# Patient Record
Sex: FEMALE | Race: WHITE | Employment: FULL TIME | URBAN - NONMETROPOLITAN AREA
[De-identification: names, ages, dates, MRNs, and addresses within clinical notes are randomized per-mention and may not be internally consistent; named-entity substitution may affect disease eponyms.]

---

## 2017-03-14 ENCOUNTER — OFFICE VISIT (OUTPATIENT)
Dept: ENDOCRINOLOGY | Age: 42
End: 2017-03-14

## 2017-03-14 VITALS
SYSTOLIC BLOOD PRESSURE: 132 MMHG | WEIGHT: 293 LBS | BODY MASS INDEX: 50.02 KG/M2 | HEART RATE: 101 BPM | RESPIRATION RATE: 20 BRPM | HEIGHT: 64 IN | DIASTOLIC BLOOD PRESSURE: 83 MMHG

## 2017-03-14 DIAGNOSIS — E11.9 TYPE 2 DIABETES MELLITUS WITHOUT COMPLICATION, WITHOUT LONG-TERM CURRENT USE OF INSULIN (HCC): Primary | ICD-10-CM

## 2017-03-14 DIAGNOSIS — E78.5 DYSLIPIDEMIA: ICD-10-CM

## 2017-03-14 DIAGNOSIS — E04.9 GOITER: ICD-10-CM

## 2017-03-14 PROBLEM — E78.2 MIXED HYPERLIPIDEMIA: Status: ACTIVE | Noted: 2017-03-14

## 2017-03-14 PROCEDURE — 99214 OFFICE O/P EST MOD 30 MIN: CPT | Performed by: INTERNAL MEDICINE

## 2017-03-14 RX ORDER — ATORVASTATIN CALCIUM 40 MG/1
40 TABLET, FILM COATED ORAL DAILY
Qty: 30 TABLET | Refills: 5 | Status: SHIPPED | OUTPATIENT
Start: 2017-03-14 | End: 2017-08-10 | Stop reason: SDUPTHER

## 2017-03-14 RX ORDER — LORATADINE 10 MG/1
10 TABLET ORAL DAILY
COMMUNITY
End: 2017-08-10 | Stop reason: CLARIF

## 2017-07-25 ENCOUNTER — APPOINTMENT (OUTPATIENT)
Dept: INTERVENTIONAL RADIOLOGY/VASCULAR | Age: 42
DRG: 607 | End: 2017-07-25
Payer: COMMERCIAL

## 2017-07-25 ENCOUNTER — HOSPITAL ENCOUNTER (INPATIENT)
Age: 42
LOS: 2 days | Discharge: HOME | DRG: 607 | End: 2017-07-27
Attending: EMERGENCY MEDICINE | Admitting: INTERNAL MEDICINE
Payer: COMMERCIAL

## 2017-07-25 DIAGNOSIS — B95.8 STAPHYLOCOCCAL SKIN DISORDER: Primary | ICD-10-CM

## 2017-07-25 DIAGNOSIS — L08.9 STAPHYLOCOCCAL SKIN DISORDER: Primary | ICD-10-CM

## 2017-07-25 PROBLEM — L03.119 CELLULITIS OF LOWER EXTREMITY: Status: ACTIVE | Noted: 2017-07-25

## 2017-07-25 LAB
ANION GAP SERPL CALCULATED.3IONS-SCNC: 18 MEQ/L (ref 8–16)
ANISOCYTOSIS: ABNORMAL
BASOPHILS # BLD: 0.5 %
BASOPHILS ABSOLUTE: 0.1 THOU/MM3 (ref 0–0.1)
BUN BLDV-MCNC: 9 MG/DL (ref 7–22)
CALCIUM SERPL-MCNC: 10 MG/DL (ref 8.5–10.5)
CHLORIDE BLD-SCNC: 97 MEQ/L (ref 98–111)
CO2: 23 MEQ/L (ref 23–33)
CREAT SERPL-MCNC: 0.5 MG/DL (ref 0.4–1.2)
EOSINOPHIL # BLD: 3.3 %
EOSINOPHILS ABSOLUTE: 0.3 THOU/MM3 (ref 0–0.4)
GFR SERPL CREATININE-BSD FRML MDRD: > 90 ML/MIN/1.73M2
GLUCOSE BLD-MCNC: 156 MG/DL (ref 70–108)
HCT VFR BLD CALC: 40.5 % (ref 37–47)
HEMOGLOBIN: 13.7 GM/DL (ref 12–16)
LACTIC ACID: 2.6 MMOL/L (ref 0.5–2.2)
LYMPHOCYTES # BLD: 23 %
LYMPHOCYTES ABSOLUTE: 2.4 THOU/MM3 (ref 1–4.8)
MCH RBC QN AUTO: 28.9 PG (ref 27–31)
MCHC RBC AUTO-ENTMCNC: 33.7 GM/DL (ref 33–37)
MCV RBC AUTO: 85.8 FL (ref 81–99)
MONOCYTES # BLD: 5.7 %
MONOCYTES ABSOLUTE: 0.6 THOU/MM3 (ref 0.4–1.3)
NUCLEATED RED BLOOD CELLS: 0 /100 WBC
OSMOLALITY CALCULATION: 277.6 MOSMOL/KG (ref 275–300)
PDW BLD-RTO: 15.7 % (ref 11.5–14.5)
PLATELET # BLD: 242 THOU/MM3 (ref 130–400)
PMV BLD AUTO: 10.3 MCM (ref 7.4–10.4)
POTASSIUM SERPL-SCNC: 3.7 MEQ/L (ref 3.5–5.2)
RBC # BLD: 4.72 MILL/MM3 (ref 4.2–5.4)
RBC # BLD: NORMAL 10*6/UL
SEG NEUTROPHILS: 67.5 %
SEGMENTED NEUTROPHILS ABSOLUTE COUNT: 7 THOU/MM3 (ref 1.8–7.7)
SODIUM BLD-SCNC: 138 MEQ/L (ref 135–145)
WBC # BLD: 10.3 THOU/MM3 (ref 4.8–10.8)

## 2017-07-25 PROCEDURE — 85025 COMPLETE CBC W/AUTO DIFF WBC: CPT

## 2017-07-25 PROCEDURE — 1200000000 HC SEMI PRIVATE

## 2017-07-25 PROCEDURE — 83605 ASSAY OF LACTIC ACID: CPT

## 2017-07-25 PROCEDURE — 96375 TX/PRO/DX INJ NEW DRUG ADDON: CPT

## 2017-07-25 PROCEDURE — 2580000003 HC RX 258: Performed by: STUDENT IN AN ORGANIZED HEALTH CARE EDUCATION/TRAINING PROGRAM

## 2017-07-25 PROCEDURE — 99284 EMERGENCY DEPT VISIT MOD MDM: CPT

## 2017-07-25 PROCEDURE — 6360000002 HC RX W HCPCS: Performed by: STUDENT IN AN ORGANIZED HEALTH CARE EDUCATION/TRAINING PROGRAM

## 2017-07-25 PROCEDURE — 36415 COLL VENOUS BLD VENIPUNCTURE: CPT

## 2017-07-25 PROCEDURE — 99223 1ST HOSP IP/OBS HIGH 75: CPT | Performed by: INTERNAL MEDICINE

## 2017-07-25 PROCEDURE — 96365 THER/PROPH/DIAG IV INF INIT: CPT

## 2017-07-25 PROCEDURE — 80048 BASIC METABOLIC PNL TOTAL CA: CPT

## 2017-07-25 PROCEDURE — 87040 BLOOD CULTURE FOR BACTERIA: CPT

## 2017-07-25 RX ADMIN — HYDROMORPHONE HYDROCHLORIDE 0.5 MG: 1 INJECTION, SOLUTION INTRAMUSCULAR; INTRAVENOUS; SUBCUTANEOUS at 22:09

## 2017-07-25 RX ADMIN — CEFTRIAXONE 1 G: 1 INJECTION, POWDER, FOR SOLUTION INTRAMUSCULAR; INTRAVENOUS at 20:53

## 2017-07-25 ASSESSMENT — ENCOUNTER SYMPTOMS
EYE PAIN: 0
NAUSEA: 0
WHEEZING: 0
RHINORRHEA: 0
VOMITING: 0
EYE DISCHARGE: 0
ABDOMINAL PAIN: 0
BACK PAIN: 0
DIARRHEA: 0
SORE THROAT: 0
COUGH: 0
SHORTNESS OF BREATH: 0

## 2017-07-25 ASSESSMENT — PAIN DESCRIPTION - PAIN TYPE
TYPE: ACUTE PAIN
TYPE: ACUTE PAIN

## 2017-07-25 ASSESSMENT — PAIN DESCRIPTION - FREQUENCY: FREQUENCY: CONTINUOUS

## 2017-07-25 ASSESSMENT — PAIN DESCRIPTION - LOCATION
LOCATION: LEG
LOCATION: LEG

## 2017-07-25 ASSESSMENT — PAIN DESCRIPTION - ORIENTATION
ORIENTATION: RIGHT;LEFT
ORIENTATION: RIGHT;LEFT

## 2017-07-25 ASSESSMENT — PAIN SCALES - GENERAL
PAINLEVEL_OUTOF10: 8
PAINLEVEL_OUTOF10: 8

## 2017-07-25 ASSESSMENT — PAIN DESCRIPTION - DESCRIPTORS
DESCRIPTORS: TIGHTNESS
DESCRIPTORS: SQUEEZING

## 2017-07-26 ENCOUNTER — APPOINTMENT (OUTPATIENT)
Dept: INTERVENTIONAL RADIOLOGY/VASCULAR | Age: 42
DRG: 607 | End: 2017-07-26
Payer: COMMERCIAL

## 2017-07-26 PROBLEM — M79.10 MYALGIA: Status: ACTIVE | Noted: 2017-07-26

## 2017-07-26 PROBLEM — T78.40XA ALLERGIC DRUG REACTION: Status: ACTIVE | Noted: 2017-07-26

## 2017-07-26 LAB
C-REACTIVE PROTEIN: 0.76 MG/DL (ref 0–1)
LACTIC ACID: 2.3 MMOL/L (ref 0.5–2.2)
RPR: NONREACTIVE
SEDIMENTATION RATE, ERYTHROCYTE: 41 MM/HR (ref 0–20)

## 2017-07-26 PROCEDURE — 85651 RBC SED RATE NONAUTOMATED: CPT

## 2017-07-26 PROCEDURE — 2500000003 HC RX 250 WO HCPCS: Performed by: INTERNAL MEDICINE

## 2017-07-26 PROCEDURE — 86592 SYPHILIS TEST NON-TREP QUAL: CPT

## 2017-07-26 PROCEDURE — 6370000000 HC RX 637 (ALT 250 FOR IP): Performed by: INTERNAL MEDICINE

## 2017-07-26 PROCEDURE — 6360000002 HC RX W HCPCS: Performed by: INTERNAL MEDICINE

## 2017-07-26 PROCEDURE — 99232 SBSQ HOSP IP/OBS MODERATE 35: CPT | Performed by: FAMILY MEDICINE

## 2017-07-26 PROCEDURE — 93970 EXTREMITY STUDY: CPT

## 2017-07-26 PROCEDURE — 86140 C-REACTIVE PROTEIN: CPT

## 2017-07-26 PROCEDURE — 1200000000 HC SEMI PRIVATE

## 2017-07-26 PROCEDURE — 36415 COLL VENOUS BLD VENIPUNCTURE: CPT

## 2017-07-26 RX ORDER — OYSTER SHELL CALCIUM WITH VITAMIN D 500; 200 MG/1; [IU]/1
1 TABLET, FILM COATED ORAL DAILY
Status: DISCONTINUED | OUTPATIENT
Start: 2017-07-26 | End: 2017-07-27 | Stop reason: HOSPADM

## 2017-07-26 RX ORDER — ALBUTEROL SULFATE 90 UG/1
2 AEROSOL, METERED RESPIRATORY (INHALATION) EVERY 6 HOURS PRN
Status: DISCONTINUED | OUTPATIENT
Start: 2017-07-26 | End: 2017-07-27 | Stop reason: HOSPADM

## 2017-07-26 RX ORDER — FLUOXETINE HYDROCHLORIDE 20 MG/1
20 CAPSULE ORAL DAILY
Status: DISCONTINUED | OUTPATIENT
Start: 2017-07-26 | End: 2017-07-27 | Stop reason: HOSPADM

## 2017-07-26 RX ORDER — CLINDAMYCIN PHOSPHATE 600 MG/50ML
600 INJECTION INTRAVENOUS EVERY 8 HOURS
Status: DISCONTINUED | OUTPATIENT
Start: 2017-07-26 | End: 2017-07-27 | Stop reason: HOSPADM

## 2017-07-26 RX ORDER — CETIRIZINE HYDROCHLORIDE 10 MG/1
10 TABLET ORAL DAILY
Status: DISCONTINUED | OUTPATIENT
Start: 2017-07-26 | End: 2017-07-27 | Stop reason: HOSPADM

## 2017-07-26 RX ORDER — METHYLPREDNISOLONE SODIUM SUCCINATE 40 MG/ML
40 INJECTION, POWDER, LYOPHILIZED, FOR SOLUTION INTRAMUSCULAR; INTRAVENOUS EVERY 6 HOURS
Status: DISCONTINUED | OUTPATIENT
Start: 2017-07-26 | End: 2017-07-27 | Stop reason: HOSPADM

## 2017-07-26 RX ORDER — MONTELUKAST SODIUM 10 MG/1
10 TABLET ORAL NIGHTLY
Status: DISCONTINUED | OUTPATIENT
Start: 2017-07-26 | End: 2017-07-27 | Stop reason: HOSPADM

## 2017-07-26 RX ORDER — ATORVASTATIN CALCIUM 40 MG/1
40 TABLET, FILM COATED ORAL DAILY
Status: DISCONTINUED | OUTPATIENT
Start: 2017-07-26 | End: 2017-07-27 | Stop reason: HOSPADM

## 2017-07-26 RX ORDER — DIPHENHYDRAMINE HYDROCHLORIDE 50 MG/ML
12.5 INJECTION INTRAMUSCULAR; INTRAVENOUS EVERY 6 HOURS PRN
Status: DISCONTINUED | OUTPATIENT
Start: 2017-07-26 | End: 2017-07-27 | Stop reason: HOSPADM

## 2017-07-26 RX ORDER — MORPHINE SULFATE 2 MG/ML
2 INJECTION, SOLUTION INTRAMUSCULAR; INTRAVENOUS EVERY 4 HOURS PRN
Status: DISCONTINUED | OUTPATIENT
Start: 2017-07-26 | End: 2017-07-27 | Stop reason: HOSPADM

## 2017-07-26 RX ORDER — ROPINIROLE 1 MG/1
1 TABLET, FILM COATED ORAL NIGHTLY
Status: DISCONTINUED | OUTPATIENT
Start: 2017-07-26 | End: 2017-07-27 | Stop reason: HOSPADM

## 2017-07-26 RX ADMIN — CLINDAMYCIN PHOSPHATE 600 MG: 12 INJECTION, SOLUTION INTRAMUSCULAR; INTRAVENOUS at 10:12

## 2017-07-26 RX ADMIN — METHYLPREDNISOLONE SODIUM SUCCINATE 40 MG: 40 INJECTION, POWDER, FOR SOLUTION INTRAMUSCULAR; INTRAVENOUS at 14:44

## 2017-07-26 RX ADMIN — METHYLPREDNISOLONE SODIUM SUCCINATE 40 MG: 40 INJECTION, POWDER, FOR SOLUTION INTRAMUSCULAR; INTRAVENOUS at 09:11

## 2017-07-26 RX ADMIN — ROPINIROLE HYDROCHLORIDE 1 MG: 1 TABLET, FILM COATED ORAL at 20:57

## 2017-07-26 RX ADMIN — DIPHENHYDRAMINE HYDROCHLORIDE 12.5 MG: 50 INJECTION, SOLUTION INTRAMUSCULAR; INTRAVENOUS at 20:57

## 2017-07-26 RX ADMIN — MONTELUKAST SODIUM 10 MG: 10 TABLET, FILM COATED ORAL at 20:57

## 2017-07-26 RX ADMIN — MONTELUKAST SODIUM 10 MG: 10 TABLET, FILM COATED ORAL at 01:49

## 2017-07-26 RX ADMIN — DIPHENHYDRAMINE HYDROCHLORIDE 12.5 MG: 50 INJECTION, SOLUTION INTRAMUSCULAR; INTRAVENOUS at 14:43

## 2017-07-26 RX ADMIN — ROPINIROLE HYDROCHLORIDE 1 MG: 1 TABLET, FILM COATED ORAL at 01:49

## 2017-07-26 RX ADMIN — CLINDAMYCIN PHOSPHATE 600 MG: 12 INJECTION, SOLUTION INTRAMUSCULAR; INTRAVENOUS at 01:49

## 2017-07-26 RX ADMIN — ATORVASTATIN CALCIUM 40 MG: 40 TABLET, FILM COATED ORAL at 20:57

## 2017-07-26 RX ADMIN — METFORMIN HYDROCHLORIDE 500 MG: 500 TABLET ORAL at 09:11

## 2017-07-26 RX ADMIN — MORPHINE SULFATE 2 MG: 2 INJECTION, SOLUTION INTRAMUSCULAR; INTRAVENOUS at 02:20

## 2017-07-26 RX ADMIN — METFORMIN HYDROCHLORIDE 500 MG: 500 TABLET ORAL at 18:07

## 2017-07-26 RX ADMIN — FLUOXETINE 20 MG: 20 CAPSULE ORAL at 20:59

## 2017-07-26 RX ADMIN — CLINDAMYCIN PHOSPHATE 600 MG: 12 INJECTION, SOLUTION INTRAMUSCULAR; INTRAVENOUS at 18:09

## 2017-07-26 RX ADMIN — METHYLPREDNISOLONE SODIUM SUCCINATE 40 MG: 40 INJECTION, POWDER, FOR SOLUTION INTRAMUSCULAR; INTRAVENOUS at 20:57

## 2017-07-26 RX ADMIN — METHYLPREDNISOLONE SODIUM SUCCINATE 40 MG: 40 INJECTION, POWDER, FOR SOLUTION INTRAMUSCULAR; INTRAVENOUS at 01:49

## 2017-07-26 RX ADMIN — FLUOXETINE 20 MG: 20 CAPSULE ORAL at 02:20

## 2017-07-26 RX ADMIN — DIPHENHYDRAMINE HYDROCHLORIDE 12.5 MG: 50 INJECTION, SOLUTION INTRAMUSCULAR; INTRAVENOUS at 00:56

## 2017-07-26 ASSESSMENT — PAIN SCALES - GENERAL
PAINLEVEL_OUTOF10: 4
PAINLEVEL_OUTOF10: 4
PAINLEVEL_OUTOF10: 6

## 2017-07-27 VITALS
OXYGEN SATURATION: 97 % | DIASTOLIC BLOOD PRESSURE: 70 MMHG | HEART RATE: 98 BPM | RESPIRATION RATE: 16 BRPM | SYSTOLIC BLOOD PRESSURE: 138 MMHG | BODY MASS INDEX: 50.02 KG/M2 | TEMPERATURE: 98.5 F | HEIGHT: 64 IN | WEIGHT: 293 LBS

## 2017-07-27 PROBLEM — E78.5 HYPERLIPIDEMIA: Status: ACTIVE | Noted: 2017-03-14

## 2017-07-27 LAB
ANISOCYTOSIS: ABNORMAL
BASOPHILS # BLD: 0.1 %
BASOPHILS ABSOLUTE: 0 THOU/MM3 (ref 0–0.1)
EOSINOPHIL # BLD: 0 %
EOSINOPHILS ABSOLUTE: 0 THOU/MM3 (ref 0–0.4)
HCT VFR BLD CALC: 37.8 % (ref 37–47)
HEMOGLOBIN: 12.6 GM/DL (ref 12–16)
LYMPHOCYTES # BLD: 7.9 %
LYMPHOCYTES ABSOLUTE: 1.1 THOU/MM3 (ref 1–4.8)
MCH RBC QN AUTO: 29.2 PG (ref 27–31)
MCHC RBC AUTO-ENTMCNC: 33.2 GM/DL (ref 33–37)
MCV RBC AUTO: 87.7 FL (ref 81–99)
MONOCYTES # BLD: 2.5 %
MONOCYTES ABSOLUTE: 0.3 THOU/MM3 (ref 0.4–1.3)
NUCLEATED RED BLOOD CELLS: 0 /100 WBC
PDW BLD-RTO: 15 % (ref 11.5–14.5)
PLATELET # BLD: 244 THOU/MM3 (ref 130–400)
PMV BLD AUTO: 10.5 MCM (ref 7.4–10.4)
RBC # BLD: 4.31 MILL/MM3 (ref 4.2–5.4)
RBC # BLD: NORMAL 10*6/UL
SEG NEUTROPHILS: 89.5 %
SEGMENTED NEUTROPHILS ABSOLUTE COUNT: 12.3 THOU/MM3 (ref 1.8–7.7)
WBC # BLD: 13.7 THOU/MM3 (ref 4.8–10.8)

## 2017-07-27 PROCEDURE — 36415 COLL VENOUS BLD VENIPUNCTURE: CPT

## 2017-07-27 PROCEDURE — 6360000002 HC RX W HCPCS: Performed by: INTERNAL MEDICINE

## 2017-07-27 PROCEDURE — 2500000003 HC RX 250 WO HCPCS: Performed by: INTERNAL MEDICINE

## 2017-07-27 PROCEDURE — 85025 COMPLETE CBC W/AUTO DIFF WBC: CPT

## 2017-07-27 PROCEDURE — 6370000000 HC RX 637 (ALT 250 FOR IP): Performed by: INTERNAL MEDICINE

## 2017-07-27 PROCEDURE — 99239 HOSP IP/OBS DSCHRG MGMT >30: CPT | Performed by: FAMILY MEDICINE

## 2017-07-27 RX ORDER — CLINDAMYCIN HYDROCHLORIDE 150 MG/1
300 CAPSULE ORAL 3 TIMES DAILY
Qty: 60 CAPSULE | Refills: 0 | Status: SHIPPED | OUTPATIENT
Start: 2017-07-27 | End: 2017-08-06

## 2017-07-27 RX ORDER — PREDNISONE 10 MG/1
TABLET ORAL
Qty: 45 EACH | Refills: 0 | Status: SHIPPED | OUTPATIENT
Start: 2017-07-27 | End: 2017-08-10 | Stop reason: ALTCHOICE

## 2017-07-27 RX ADMIN — CETIRIZINE HYDROCHLORIDE 10 MG: 10 TABLET ORAL at 09:26

## 2017-07-27 RX ADMIN — METHYLPREDNISOLONE SODIUM SUCCINATE 40 MG: 40 INJECTION, POWDER, FOR SOLUTION INTRAMUSCULAR; INTRAVENOUS at 09:26

## 2017-07-27 RX ADMIN — CLINDAMYCIN PHOSPHATE 600 MG: 12 INJECTION, SOLUTION INTRAMUSCULAR; INTRAVENOUS at 02:26

## 2017-07-27 RX ADMIN — METFORMIN HYDROCHLORIDE 500 MG: 500 TABLET ORAL at 09:26

## 2017-07-27 RX ADMIN — METHYLPREDNISOLONE SODIUM SUCCINATE 40 MG: 40 INJECTION, POWDER, FOR SOLUTION INTRAMUSCULAR; INTRAVENOUS at 02:26

## 2017-07-27 RX ADMIN — CLINDAMYCIN PHOSPHATE 600 MG: 12 INJECTION, SOLUTION INTRAMUSCULAR; INTRAVENOUS at 09:32

## 2017-07-27 RX ADMIN — CALCIUM CARBONATE-VITAMIN D TAB 500 MG-200 UNIT 1 TABLET: 500-200 TAB at 09:26

## 2017-07-31 LAB
BLOOD CULTURE, ROUTINE: NORMAL
BLOOD CULTURE, ROUTINE: NORMAL

## 2017-08-10 ENCOUNTER — OFFICE VISIT (OUTPATIENT)
Dept: ENDOCRINOLOGY | Age: 42
End: 2017-08-10
Payer: COMMERCIAL

## 2017-08-10 ENCOUNTER — HOSPITAL ENCOUNTER (OUTPATIENT)
Age: 42
Discharge: HOME | End: 2017-08-10
Payer: COMMERCIAL

## 2017-08-10 VITALS
BODY MASS INDEX: 49.08 KG/M2 | HEIGHT: 64 IN | HEART RATE: 113 BPM | SYSTOLIC BLOOD PRESSURE: 137 MMHG | RESPIRATION RATE: 20 BRPM | DIASTOLIC BLOOD PRESSURE: 86 MMHG | WEIGHT: 287.5 LBS

## 2017-08-10 DIAGNOSIS — E04.2 MULTINODULAR GOITER: ICD-10-CM

## 2017-08-10 DIAGNOSIS — E11.9 TYPE 2 DIABETES MELLITUS WITHOUT COMPLICATION, WITHOUT LONG-TERM CURRENT USE OF INSULIN (HCC): Primary | ICD-10-CM

## 2017-08-10 DIAGNOSIS — E78.5 DYSLIPIDEMIA: ICD-10-CM

## 2017-08-10 DIAGNOSIS — E78.00 PURE HYPERCHOLESTEROLEMIA: ICD-10-CM

## 2017-08-10 LAB
ALBUMIN SERPL-MCNC: 3.6 G/DL (ref 3.5–5.1)
ALP BLD-CCNC: 62 U/L (ref 38–126)
ALT SERPL-CCNC: 32 U/L (ref 11–66)
AST SERPL-CCNC: 23 U/L (ref 5–40)
BILIRUB SERPL-MCNC: 0.5 MG/DL (ref 0.3–1.2)
BILIRUBIN DIRECT: < 0.2 MG/DL (ref 0–0.3)
HBA1C MFR BLD: 8.6 %
T4 FREE: 1.24 NG/DL (ref 0.93–1.76)
TOTAL PROTEIN: 6.6 G/DL (ref 6.1–8)
TSH SERPL DL<=0.05 MIU/L-ACNC: 1.03 UIU/ML (ref 0.4–4.2)

## 2017-08-10 PROCEDURE — 80076 HEPATIC FUNCTION PANEL: CPT

## 2017-08-10 PROCEDURE — 84443 ASSAY THYROID STIM HORMONE: CPT

## 2017-08-10 PROCEDURE — 36416 COLLJ CAPILLARY BLOOD SPEC: CPT | Performed by: INTERNAL MEDICINE

## 2017-08-10 PROCEDURE — 99214 OFFICE O/P EST MOD 30 MIN: CPT | Performed by: INTERNAL MEDICINE

## 2017-08-10 PROCEDURE — 36415 COLL VENOUS BLD VENIPUNCTURE: CPT

## 2017-08-10 PROCEDURE — 83036 HEMOGLOBIN GLYCOSYLATED A1C: CPT | Performed by: INTERNAL MEDICINE

## 2017-08-10 PROCEDURE — 84439 ASSAY OF FREE THYROXINE: CPT

## 2017-08-10 RX ORDER — ATORVASTATIN CALCIUM 40 MG/1
40 TABLET, FILM COATED ORAL DAILY
Qty: 30 TABLET | Refills: 5 | Status: SHIPPED | OUTPATIENT
Start: 2017-08-10 | End: 2018-02-15 | Stop reason: SDUPTHER

## 2017-08-17 ENCOUNTER — HOSPITAL ENCOUNTER (OUTPATIENT)
Dept: ULTRASOUND IMAGING | Age: 42
Discharge: HOME | End: 2017-08-17
Payer: COMMERCIAL

## 2017-08-17 DIAGNOSIS — Z00.6 EXAMINATION FOR NORMAL COMPARISON FOR CLINICAL RESEARCH: ICD-10-CM

## 2017-08-17 PROCEDURE — 3209999900 US COMPARISON OF OUTSIDE FILMS

## 2017-08-24 ENCOUNTER — HOSPITAL ENCOUNTER (OUTPATIENT)
Dept: ULTRASOUND IMAGING | Age: 42
Discharge: HOME | End: 2017-08-24
Payer: COMMERCIAL

## 2017-08-24 DIAGNOSIS — E04.2 MULTINODULAR GOITER: ICD-10-CM

## 2017-08-24 PROCEDURE — 76536 US EXAM OF HEAD AND NECK: CPT

## 2017-08-24 RX ORDER — BLOOD-GLUCOSE METER
KIT MISCELLANEOUS
Qty: 1 KIT | Refills: 0 | Status: SHIPPED | OUTPATIENT
Start: 2017-08-24 | End: 2017-08-24

## 2017-08-24 RX ORDER — BLOOD-GLUCOSE METER
EACH MISCELLANEOUS
Qty: 1 KIT | Refills: 0 | Status: SHIPPED | OUTPATIENT
Start: 2017-08-24 | End: 2018-02-21 | Stop reason: SDUPTHER

## 2017-08-29 RX ORDER — LANCETS 30 GAUGE
EACH MISCELLANEOUS
Qty: 100 EACH | Refills: 1 | Status: SHIPPED | OUTPATIENT
Start: 2017-08-29

## 2017-09-11 ENCOUNTER — HOSPITAL ENCOUNTER (OUTPATIENT)
Age: 42
Discharge: HOME | End: 2017-09-11
Payer: COMMERCIAL

## 2017-09-11 LAB
ALBUMIN SERPL-MCNC: 4.1 G/DL (ref 3.5–5.1)
ALP BLD-CCNC: 55 U/L (ref 38–126)
ALT SERPL-CCNC: 19 U/L (ref 11–66)
AST SERPL-CCNC: 20 U/L (ref 5–40)
BILIRUB SERPL-MCNC: 0.6 MG/DL (ref 0.3–1.2)
BILIRUBIN DIRECT: < 0.2 MG/DL (ref 0–0.3)
TOTAL PROTEIN: 7 G/DL (ref 6.1–8)

## 2017-09-11 PROCEDURE — 36415 COLL VENOUS BLD VENIPUNCTURE: CPT

## 2017-09-11 PROCEDURE — 80076 HEPATIC FUNCTION PANEL: CPT

## 2017-09-26 ENCOUNTER — HOSPITAL ENCOUNTER (OUTPATIENT)
Age: 42
Discharge: HOME | End: 2017-09-26
Payer: COMMERCIAL

## 2017-09-26 LAB
ALBUMIN SERPL-MCNC: 3.9 G/DL (ref 3.5–5.1)
ALP BLD-CCNC: 59 U/L (ref 38–126)
ALT SERPL-CCNC: 17 U/L (ref 11–66)
AST SERPL-CCNC: 16 U/L (ref 5–40)
BILIRUB SERPL-MCNC: 0.5 MG/DL (ref 0.3–1.2)
BILIRUBIN DIRECT: < 0.2 MG/DL (ref 0–0.3)
TOTAL PROTEIN: 7 G/DL (ref 6.1–8)

## 2017-09-26 PROCEDURE — 36415 COLL VENOUS BLD VENIPUNCTURE: CPT

## 2017-09-26 PROCEDURE — 80076 HEPATIC FUNCTION PANEL: CPT

## 2017-11-16 ENCOUNTER — HOSPITAL ENCOUNTER (OUTPATIENT)
Age: 42
Discharge: HOME | End: 2017-11-16
Payer: COMMERCIAL

## 2017-11-16 ENCOUNTER — OFFICE VISIT (OUTPATIENT)
Dept: ENDOCRINOLOGY | Age: 42
End: 2017-11-16
Payer: COMMERCIAL

## 2017-11-16 VITALS
HEART RATE: 99 BPM | DIASTOLIC BLOOD PRESSURE: 80 MMHG | HEIGHT: 64 IN | SYSTOLIC BLOOD PRESSURE: 137 MMHG | BODY MASS INDEX: 47.12 KG/M2 | WEIGHT: 276 LBS | RESPIRATION RATE: 16 BRPM

## 2017-11-16 DIAGNOSIS — E04.2 MULTINODULAR GOITER: ICD-10-CM

## 2017-11-16 DIAGNOSIS — E11.9 TYPE 2 DIABETES MELLITUS WITHOUT COMPLICATION, WITHOUT LONG-TERM CURRENT USE OF INSULIN (HCC): ICD-10-CM

## 2017-11-16 DIAGNOSIS — E11.9 TYPE 2 DIABETES MELLITUS WITHOUT COMPLICATION, WITHOUT LONG-TERM CURRENT USE OF INSULIN (HCC): Primary | ICD-10-CM

## 2017-11-16 DIAGNOSIS — E78.2 MIXED HYPERLIPIDEMIA: ICD-10-CM

## 2017-11-16 LAB
AVERAGE GLUCOSE: 135 MG/DL (ref 70–126)
CREATININE, URINE: 77.9 MG/DL
GLUCOSE BLD-MCNC: 161 MG/DL
HBA1C MFR BLD: 6.5 % (ref 4.4–6.4)
MICROALBUMIN UR-MCNC: < 1.2 MG/DL
MICROALBUMIN/CREAT UR-RTO: 15 MG/G (ref 0–30)
T3 FREE: 2.73 PG/ML (ref 2.02–4.43)
T4 FREE: 1.16 NG/DL (ref 0.93–1.76)
TSH SERPL DL<=0.05 MIU/L-ACNC: 1.16 UIU/ML (ref 0.4–4.2)

## 2017-11-16 PROCEDURE — 83036 HEMOGLOBIN GLYCOSYLATED A1C: CPT

## 2017-11-16 PROCEDURE — 82962 GLUCOSE BLOOD TEST: CPT | Performed by: CLINICAL NURSE SPECIALIST

## 2017-11-16 PROCEDURE — 84481 FREE ASSAY (FT-3): CPT

## 2017-11-16 PROCEDURE — 36415 COLL VENOUS BLD VENIPUNCTURE: CPT

## 2017-11-16 PROCEDURE — 84439 ASSAY OF FREE THYROXINE: CPT

## 2017-11-16 PROCEDURE — 84443 ASSAY THYROID STIM HORMONE: CPT

## 2017-11-16 PROCEDURE — 99214 OFFICE O/P EST MOD 30 MIN: CPT | Performed by: CLINICAL NURSE SPECIALIST

## 2017-11-16 PROCEDURE — 82043 UR ALBUMIN QUANTITATIVE: CPT

## 2017-11-16 ASSESSMENT — ENCOUNTER SYMPTOMS
ABDOMINAL PAIN: 0
SHORTNESS OF BREATH: 0
CONSTIPATION: 0
CHEST TIGHTNESS: 0
VOICE CHANGE: 0
WHEEZING: 0
NAUSEA: 0
EYE REDNESS: 0
DIARRHEA: 0
COUGH: 0

## 2017-11-16 NOTE — PROGRESS NOTES
[facility] PROFESSIONAL SERVS  ENDOCRINE DIABETES METABOLISM COREEN  [address]  [address]  [address] [address]  Dept: [phone]  Dept Fax: [phone]  Loc: [phone]    Kye Castelan is a 36 y.o. female who presents today for follow up evaluation for multinodular and type 2 diabetes diagnosed. Last visit 8/2017 with Dr. Lisa Lin. Patient diagnosed with diabetes 2 years ago. She is maintained on metformin. She is checking BS infrequently and did not bring meter or log book. She states post meal BS yesterday 147; it was 161 in office random. A1c 8.6% in 8/2017. Patient reports she has modified her diet, trying to make healthy meal choices and exercising by walking many days. Weight loss 10 lbs, BMI 47.1. She has not had recent eye exam - denies current visual disturbance -  reminded to get dilated exam.  Denies foot problems, no numbness, tingling, rash or lesion. Denies polyuria, polydipsia, abdominal pain, N/V, SOB, symptoms of infection, blurred vision. She is also diagnosed with hyperlipiemia - tolearting statin without adverse effects. Patient was scheduled to get fine needle biopsy for thyroid nodules however, radiology report at [address] [place] [address] 8/2017 indicated bilateral nodules measure smaller than previous US with low suspicion characteristics for malignancy and not amendable to FNA with 6 month follow up recommended. Patient states she does have family history of thyroid disease and history of thyroid cancer in 1 st cousin. She denies dysphagia, dysphonia, thyroid enlargement or thyroid pain  Historically euthyroid - denies symptoms of hypo/hyperthyroid - 12/2016 TSH 1.77, FT4 at 0.78.      Chief Complaint   Patient presents with    Diabetes     Type 2, did not bring meter, last checked BS last night, 147 2hrs after eating dinner    Hyperlipidemia    Goiter    Other     No asa    Discuss Labs     labs done     Other     Needs to get an eye exam soon    Other     Pt denies foot problems, recently had athlete's foot but got it taken care of       HPI:     HPI    Past Medical History:   Diagnosis Date    Allergic rhinitis     Asthma     Diabetes ([facility] [address]     Psychiatric problem     depression      Past Surgical History:   Procedure Laterality Date    CARPAL TUNNEL RELEASE       SECTION      HYSTERECTOMY       Family History   Problem Relation Age of Onset    Thyroid Disease Mother     Depression Mother     Cancer Father     Diabetes Sister     Cancer Maternal Uncle      Social History   Substance Use Topics    Smoking status: Never Smoker    Smokeless tobacco: Never Used    Alcohol use No      Current Outpatient Prescriptions   Medication Sig Dispense Refill    Lancets MISC Use to test blood glucose level 1 time per day. Diagnosis: E11.9 100 each 1    Blood Glucose Monitoring Suppl (ACCU-CHEK ROD PLUS) w/Device KIT Check blood sugar once daily 1 kit 0    glucose blood VI test strips (ACCU-CHEK ROD PLUS) strip Check blood sugar once daily 100 each 3    atorvastatin (LIPITOR) 40 MG tablet Take 1 tablet by mouth daily 30 tablet 5    metFORMIN (GLUCOPHAGE) 1000 MG tablet Take 1 tablet by mouth 2 times daily (with meals) 60 tablet 5    FLUoxetine (PROZAC) 20 MG capsule Take 20 mg by mouth daily      montelukast (SINGULAIR) 10 MG tablet Take 10 mg by mouth nightly      rOPINIRole (REQUIP) 0.25 MG tablet Take 1 mg by mouth nightly      albuterol sulfate  (90 BASE) MCG/ACT inhaler Inhale 2 puffs into the lungs every 6 hours as needed for Wheezing      Cetirizine HCl (ZYRTEC ALLERGY) 10 MG CAPS Take 1 tablet by mouth daily      calcium-vitamin D (OSCAL-500) 500-200 MG-UNIT per tablet Take 1 tablet by mouth daily       No current facility-administered medications for this visit.       Allergies   Allergen Reactions    Latex Rash    Keflex [Cephalexin] Rash    Betadine [Povidone Iodine]     Neosporin [Neomycin-Polymyxin-Gramicidin]     Pcn [Penicillins]     Rocephin [Ceftriaxone] Rash       Subjective:      Review of Systems   Constitutional: Negative for appetite change, chills, fatigue and unexpected weight change. HENT: Negative for hearing loss, tinnitus and voice change. Eyes: Negative for redness and visual disturbance. Respiratory: Negative for cough, chest tightness, shortness of breath and wheezing. Cardiovascular: Negative for chest pain, palpitations and leg swelling. Gastrointestinal: Negative for abdominal pain, constipation, diarrhea and nausea. Endocrine: Negative. Genitourinary: Negative for difficulty urinating, dysuria, frequency and hematuria. Musculoskeletal: Negative for gait problem, myalgias and neck pain. Skin: Negative for rash. Neurological: Negative for dizziness, tremors, facial asymmetry, weakness, numbness and headaches. Hematological: Negative for adenopathy. Psychiatric/Behavioral: Negative for agitation, confusion, sleep disturbance and suicidal ideas. The patient is not nervous/anxious and is not hyperactive. Objective:     Physical Exam   Constitutional: She is oriented to person, place, and time. She appears well-developed and well-nourished. No distress. HENT:   Head: Normocephalic and atraumatic. Right Ear: External ear normal.   Left Ear: External ear normal.   Nose: Nose normal.   Eyes: Conjunctivae and EOM are normal. Pupils are equal, round, and reactive to light. Right eye exhibits no discharge. Left eye exhibits no discharge. No scleral icterus. Neck: Normal range of motion. Neck supple. No thyromegaly present. Cardiovascular: Normal rate, regular rhythm, normal heart sounds and intact distal pulses. No murmur heard. Pulmonary/Chest: Effort normal and breath sounds normal. No stridor. No respiratory distress. She has no wheezes. She has no rales. Abdominal: Soft. Bowel sounds are normal. There is no tenderness. Musculoskeletal: Normal range of motion.  She exhibits no edema or tenderness. Lymphadenopathy:     She has no cervical adenopathy. Neurological: She is alert and oriented to person, place, and time. No cranial nerve deficit. Coordination normal.   Skin: Skin is warm and dry. She is not diaphoretic. Psychiatric: She has a normal mood and affect. Her behavior is normal. Judgment and thought content normal.       Assessment:      /80 (Site: Right Arm, Position: Sitting, Cuff Size: Medium Adult)   Pulse 99   Resp 16   Ht 5' 4.17\" (1.63 m)   Wt 276 lb (125.2 kg)   BMI 47.12 kg/m²   1. Type 2 diabetes mellitus without complication, without long-term current use of insulin ([facility] [address] - patient did not bring meter - random 161 - she has had successful weight loss with modified diet and exercise. Reviewed glycemic goals, monitoring. Request readings between visits if not in goal. Will check A1c. Continue therapeutic lifestyle changes for weight loss, glycemic control. Discussed adverse complications of poor control  7/2017 BUN 9, creat 0.5, GFR > 90     2. Hyperlipdiemia - treated & tolerating - 12/2016 chol 125, trig 115, HDL 62, LDL 62, liver panel normal     3. Multinodualr goiter - asymptomatic - family history of thyroid cancer 1st cousin - will repeat US for stability 6 months from previous 2/2018 - discussed symptoms to report prior to that time. Historically euthyroid - will recheck thyroid function    Thyroid US 8/2017  Impression       The previously seen abnormal areas within the inferior right thyroid lobe and mid left thyroid lobe measure smaller compared to the prior exam. They demonstrate low suspicion characteristics based on American Thyroid Association guidelines. On real-time    scanning, these are not well-visualized and are not amenable to biopsy. Follow-up exam in 6 months may be considered for further evaluation and to document stability.      Greater than 50% of visit discussing glycemic management, diagnoses as above, reviewing/ordering labs, changing/addressing medication, answering questions      Plan:      Check blood sugar daily alternate before breakfast with 2 hour after a meal  Blood sugar goal fating 70 to 120  2 hour after a meal blood sugar goal less than 140    Bring meter and log book to appointments    Continue to exercise, be active as much as possible daily  Continue with portion control, healthy choices, lots of vegetables and whole grains, lean meat, fish, chicken, turkey    Continue metformin 1000 mg twice daily with a meal    Will repeat thyroid ultrasound 6 months from previous for stability - call before that time for symptoms as discuused    Lab work before next visit    Return in about 3 months (around 2/16/2018).        Electronically signed by KATHERIN Yadav on 11/16/2017 at 1:06 PM

## 2017-11-22 ENCOUNTER — TELEPHONE (OUTPATIENT)
Dept: ENDOCRINOLOGY | Age: 42
End: 2017-11-22

## 2017-11-22 DIAGNOSIS — E11.9 TYPE 2 DIABETES MELLITUS WITHOUT COMPLICATION, WITHOUT LONG-TERM CURRENT USE OF INSULIN (HCC): Primary | ICD-10-CM

## 2017-11-22 NOTE — TELEPHONE ENCOUNTER
----- Message from KATHERIN Browne sent at 11/20/2017  5:53 PM EST -----  A1c is good at 6.5% (average daily ) improved from 8.6% 3 months ago. Keep up the good work.  Thyroid labs in goal as is urine micro albumin creatinine ratio - repeat A1c about one week before next appointment

## 2018-02-15 DIAGNOSIS — E78.5 DYSLIPIDEMIA: ICD-10-CM

## 2018-02-15 DIAGNOSIS — E11.9 TYPE 2 DIABETES MELLITUS WITHOUT COMPLICATION, WITHOUT LONG-TERM CURRENT USE OF INSULIN (HCC): ICD-10-CM

## 2018-02-16 RX ORDER — ATORVASTATIN CALCIUM 40 MG/1
40 TABLET, FILM COATED ORAL DAILY
Qty: 30 TABLET | Refills: 4 | Status: SHIPPED | OUTPATIENT
Start: 2018-02-16 | End: 2018-08-14 | Stop reason: SDUPTHER

## 2018-02-21 ENCOUNTER — HOSPITAL ENCOUNTER (OUTPATIENT)
Dept: ULTRASOUND IMAGING | Age: 43
Discharge: HOME | End: 2018-02-21
Payer: COMMERCIAL

## 2018-02-21 ENCOUNTER — OFFICE VISIT (OUTPATIENT)
Dept: ENDOCRINOLOGY | Age: 43
End: 2018-02-21
Payer: COMMERCIAL

## 2018-02-21 VITALS
HEIGHT: 64 IN | RESPIRATION RATE: 16 BRPM | DIASTOLIC BLOOD PRESSURE: 85 MMHG | WEIGHT: 285 LBS | BODY MASS INDEX: 48.65 KG/M2 | HEART RATE: 91 BPM | SYSTOLIC BLOOD PRESSURE: 123 MMHG

## 2018-02-21 DIAGNOSIS — E11.9 TYPE 2 DIABETES MELLITUS WITHOUT COMPLICATION, WITHOUT LONG-TERM CURRENT USE OF INSULIN (HCC): Primary | ICD-10-CM

## 2018-02-21 DIAGNOSIS — E78.2 MIXED HYPERLIPIDEMIA: ICD-10-CM

## 2018-02-21 DIAGNOSIS — E04.2 MULTINODULAR GOITER: ICD-10-CM

## 2018-02-21 LAB — GLUCOSE BLD-MCNC: 179 MG/DL

## 2018-02-21 PROCEDURE — 82962 GLUCOSE BLOOD TEST: CPT | Performed by: CLINICAL NURSE SPECIALIST

## 2018-02-21 PROCEDURE — 99213 OFFICE O/P EST LOW 20 MIN: CPT | Performed by: CLINICAL NURSE SPECIALIST

## 2018-02-21 PROCEDURE — 76536 US EXAM OF HEAD AND NECK: CPT

## 2018-02-21 RX ORDER — BLOOD-GLUCOSE METER
EACH MISCELLANEOUS
Qty: 1 KIT | Refills: 0 | Status: SHIPPED | OUTPATIENT
Start: 2018-02-21

## 2018-02-21 RX ORDER — HYDROXYZINE HYDROCHLORIDE 10 MG/1
10 TABLET, FILM COATED ORAL 3 TIMES DAILY PRN
COMMUNITY
End: 2022-04-29

## 2018-02-21 ASSESSMENT — ENCOUNTER SYMPTOMS
EYE REDNESS: 0
SHORTNESS OF BREATH: 0
WHEEZING: 0
ABDOMINAL PAIN: 0
VOICE CHANGE: 0
COUGH: 0
CHEST TIGHTNESS: 0
CONSTIPATION: 0
DIARRHEA: 0
NAUSEA: 0

## 2018-02-21 NOTE — PROGRESS NOTES
[facility] PROFESSIONAL SERVS  ENDOCRINE DIABETES METABOLISM COREEN  [address]  [address]  [address] [address]  Dept: [phone]  Dept Fax: [phone]  Loc: [phone]    Rylee Mccullough is a 36 y.o. female who presents today for  follow up evaluation for multinodular and type 2 diabetes diagnosed 2 years ago. Last visit 11/2017. She is maintained on metformin. Patient did not bring meter as \"it broke\". Patient did not have meter or log book at previous visit as well. No current A1c, was 6.5% in 11/2017. Patient reports she has modified her diet, trying to make healthy meal choices and exercising by walking many days. Weight gain 9 lbs, BMI 48.6  She has not had recent eye exam - denies current visual disturbance -  reminded to get dilated exam.  Denies foot problems, no numbness, tingling, rash or lesion. Denies polyuria, polydipsia, abdominal pain, N/V, SOB, symptoms of infection, blurred vision. She is also diagnosed with hyperlipiemia - tolearting statin without adverse effects. Patient was scheduled to get fine needle biopsy for thyroid nodules however, radiology report at [facility] 8/2017 indicated bilateral nodules measure smaller than previous US with low suspicion characteristics for malignancy and not amendable to FNA with 6 month follow up recommended. Patient states she does have family history of thyroid disease and history of thyroid cancer in 1 st cousin. She denies dysphagia, dysphonia, thyroid enlargement or thyroid pain  Historically euthyroid - denies symptoms of hypo/hyperthyroid 11/2017 TSH 1.1, FT4 at 1.16, FT3 at 2.73.      Chief Complaint   Patient presents with    Diabetes     Type 2    Foot Problem     No problems at this time except for a callus on left big toe    Eye Problem     it is time for an appointment but none scheduled    Other     Multinodular Goiter    Hyperlipidemia    Results     11/16/17- labs completed       HPI:     HPI    Past Medical History:   Diagnosis

## 2018-02-21 NOTE — PATIENT INSTRUCTIONS
Get yearly dilated eye exam     Check blood sugar daily alternate before breakfast with 2 hour after a meal  Blood sugar goal fating 70 to 120  2 hour after a meal blood sugar goal less than 140     Bring meter and log book to appointments     Continue to exercise, be active as much as possible daily  Continue with portion control, healthy choices, lots of vegetables and whole grains, lean meat, fish, chicken, turkey     Continue metformin 1000 mg twice daily with a meal    Labs and thyroid ultrasound before next visit    Follow up in 3 months

## 2018-02-23 ENCOUNTER — TELEPHONE (OUTPATIENT)
Dept: ENDOCRINOLOGY | Age: 43
End: 2018-02-23

## 2018-03-15 ENCOUNTER — HOSPITAL ENCOUNTER (EMERGENCY)
Age: 43
Discharge: HOME | End: 2018-03-15
Payer: COMMERCIAL

## 2018-03-15 ENCOUNTER — APPOINTMENT (OUTPATIENT)
Dept: GENERAL RADIOLOGY | Age: 43
End: 2018-03-15
Payer: COMMERCIAL

## 2018-03-15 VITALS
DIASTOLIC BLOOD PRESSURE: 84 MMHG | RESPIRATION RATE: 20 BRPM | TEMPERATURE: 97.4 F | SYSTOLIC BLOOD PRESSURE: 121 MMHG | OXYGEN SATURATION: 97 % | HEIGHT: 64 IN | BODY MASS INDEX: 46.95 KG/M2 | WEIGHT: 275 LBS | HEART RATE: 85 BPM

## 2018-03-15 DIAGNOSIS — M65.4 DE QUERVAIN'S TENOSYNOVITIS, LEFT: Primary | ICD-10-CM

## 2018-03-15 PROCEDURE — 6370000000 HC RX 637 (ALT 250 FOR IP): Performed by: PHYSICIAN ASSISTANT

## 2018-03-15 PROCEDURE — 99283 EMERGENCY DEPT VISIT LOW MDM: CPT

## 2018-03-15 PROCEDURE — 73110 X-RAY EXAM OF WRIST: CPT

## 2018-03-15 RX ORDER — IBUPROFEN 600 MG/1
600 TABLET ORAL EVERY 6 HOURS PRN
Qty: 30 TABLET | Refills: 0 | Status: SHIPPED | OUTPATIENT
Start: 2018-03-15 | End: 2018-03-15

## 2018-03-15 RX ORDER — TRAMADOL HYDROCHLORIDE 50 MG/1
50 TABLET ORAL ONCE
Status: COMPLETED | OUTPATIENT
Start: 2018-03-15 | End: 2018-03-15

## 2018-03-15 RX ORDER — IBUPROFEN 600 MG/1
600 TABLET ORAL EVERY 6 HOURS PRN
Qty: 30 TABLET | Refills: 0 | Status: SHIPPED | OUTPATIENT
Start: 2018-03-15 | End: 2021-12-07

## 2018-03-15 RX ADMIN — TRAMADOL HYDROCHLORIDE 50 MG: 50 TABLET, FILM COATED ORAL at 09:42

## 2018-03-15 ASSESSMENT — ENCOUNTER SYMPTOMS
VOMITING: 0
EYE DISCHARGE: 0
DIARRHEA: 0
COUGH: 0
RHINORRHEA: 0
EYE REDNESS: 0
COLOR CHANGE: 0
CONSTIPATION: 0
SHORTNESS OF BREATH: 0
NAUSEA: 0
WHEEZING: 0
ABDOMINAL PAIN: 0
BACK PAIN: 0
SORE THROAT: 0

## 2018-03-15 ASSESSMENT — PAIN DESCRIPTION - PAIN TYPE: TYPE: ACUTE PAIN

## 2018-03-15 ASSESSMENT — PAIN DESCRIPTION - DESCRIPTORS: DESCRIPTORS: SHARP

## 2018-03-15 ASSESSMENT — PAIN DESCRIPTION - FREQUENCY: FREQUENCY: INTERMITTENT

## 2018-03-15 ASSESSMENT — PAIN DESCRIPTION - ORIENTATION: ORIENTATION: LEFT

## 2018-03-15 ASSESSMENT — PAIN SCALES - GENERAL: PAINLEVEL_OUTOF10: 5

## 2018-03-15 ASSESSMENT — PAIN DESCRIPTION - LOCATION: LOCATION: WRIST

## 2018-03-15 NOTE — ED PROVIDER NOTES
(SINGULAIR) 10 MG tablet Take 10 mg by mouth nightly      rOPINIRole (REQUIP) 0.25 MG tablet Take 1 mg by mouth nightly      albuterol sulfate  (90 BASE) MCG/ACT inhaler Inhale 2 puffs into the lungs every 6 hours as needed for Wheezing      calcium-vitamin D (OSCAL-500) 500-200 MG-UNIT per tablet Take 1 tablet by mouth daily             ALLERGIES     is allergic to latex; keflex [cephalexin]; betadine [povidone iodine]; neosporin [neomycin-polymyxin-gramicidin]; pcn [penicillins]; and rocephin [ceftriaxone]. FAMILY HISTORY     indicated that her mother is alive. She indicated that her father is . She indicated that her sister is alive. She indicated that her maternal uncle is . family history includes Cancer in her father and maternal uncle; Depression in her mother; Diabetes in her sister; Thyroid Disease in her mother. SOCIAL HISTORY      reports that she has never smoked. She has never used smokeless tobacco. She reports that she drinks alcohol. She reports that she does not use drugs. PHYSICAL EXAM     INITIAL VITALS:  height is 5' 4\" (1.626 m) and weight is 275 lb (124.7 kg). Her oral temperature is 97.4 °F (36.3 °C). Her blood pressure is 121/84 and her pulse is 85. Her respiration is 20 and oxygen saturation is 97%. Physical Exam   Constitutional: She is oriented to person, place, and time. She appears well-developed and well-nourished. No distress. HENT:   Head: Normocephalic and atraumatic. Right Ear: External ear normal.   Left Ear: External ear normal.   Nose: Nose normal.   Mouth/Throat: Oropharynx is clear and moist.   Eyes: Conjunctivae and EOM are normal. Pupils are equal, round, and reactive to light. Right eye exhibits no discharge. Left eye exhibits no discharge. No scleral icterus. Neck: Normal range of motion. Neck supple. Cardiovascular: Normal rate, regular rhythm, normal heart sounds and intact distal pulses.   Exam reveals no gallop and no interpreted by the Emergency Department Physician who either signs or Co-signs this chart in the absence of a cardiologist.    None    RADIOLOGY: non-plain film images(s) such as CT, Ultrasound and MRI are read by the radiologist.    XR WRIST LEFT (MIN 3 VIEWS)   Final Result   No fracture or dislocation. Final report electronically signed by Dr. Rocío Hall on 3/15/2018 9:33 AM          LABS:     Labs Reviewed - No data to display    EMERGENCY DEPARTMENT COURSE:   Vitals:    Vitals:    03/15/18 0920   BP: 121/84   Pulse: 85   Resp: 20   Temp: 97.4 °F (36.3 °C)   TempSrc: Oral   SpO2: 97%   Weight: 275 lb (124.7 kg)   Height: 5' 4\" (1.626 m)       9:17 AM: The patient was seen and evaluated. MDM:  The patient was seen and evaluated within the ED today with left wrist pain. Within the department, I observed the patient's vital signs to be within acceptable range. On exam, I appreciated positive left scaphoid tenderness, tenderness along the adductor pollicis longus and extensor pollicis brevis, and a positive Finkelstein test of the left wirst. No noted edema of bruising of the left wrist. Patient has full ROM and strength of the LUE. There is intact sensation of soft touch in the LUE. The LUE appears to be neurovascularly intact. Radiologic studies within the department revealed no acute fracture, dislocation, or soft tissue abnormality. Within the department, the patient was treated with Tramadol for pain relief. A left thumb spica splint was placed due to the patient's left scaphoid tenderness. After splint placement, the neurovascular status of the left upper extremity was confirmed to remain intact. I observed the patient's condition to improve during the duration of the stay. I explained my proposed course of treatment to the patient and her significant other, who were amenable to my treatment and discharge decisions.  She was discharged home in stable condition with a prescription for

## 2018-08-14 DIAGNOSIS — E78.5 DYSLIPIDEMIA: ICD-10-CM

## 2018-08-14 DIAGNOSIS — E11.9 TYPE 2 DIABETES MELLITUS WITHOUT COMPLICATION, WITHOUT LONG-TERM CURRENT USE OF INSULIN (HCC): ICD-10-CM

## 2018-08-15 RX ORDER — ATORVASTATIN CALCIUM 40 MG/1
TABLET, FILM COATED ORAL
Qty: 30 TABLET | Refills: 3 | Status: SHIPPED | OUTPATIENT
Start: 2018-08-15

## 2019-05-22 ENCOUNTER — HOSPITAL ENCOUNTER (OUTPATIENT)
Dept: ULTRASOUND IMAGING | Age: 44
Discharge: HOME | End: 2019-05-22
Payer: COMMERCIAL

## 2019-05-22 DIAGNOSIS — E04.9 THYROID GOITER: ICD-10-CM

## 2019-05-22 PROCEDURE — 76536 US EXAM OF HEAD AND NECK: CPT

## 2020-11-02 ENCOUNTER — APPOINTMENT (OUTPATIENT)
Dept: GENERAL RADIOLOGY | Age: 45
End: 2020-11-02
Payer: MEDICAID

## 2020-11-02 ENCOUNTER — HOSPITAL ENCOUNTER (EMERGENCY)
Age: 45
Discharge: HOME | End: 2020-11-02
Payer: MEDICAID

## 2020-11-02 VITALS
BODY MASS INDEX: 43.32 KG/M2 | OXYGEN SATURATION: 95 % | HEART RATE: 83 BPM | TEMPERATURE: 98.4 F | WEIGHT: 260 LBS | HEIGHT: 65 IN | RESPIRATION RATE: 16 BRPM | SYSTOLIC BLOOD PRESSURE: 142 MMHG | DIASTOLIC BLOOD PRESSURE: 90 MMHG

## 2020-11-02 PROCEDURE — 73564 X-RAY EXAM KNEE 4 OR MORE: CPT

## 2020-11-02 PROCEDURE — 99282 EMERGENCY DEPT VISIT SF MDM: CPT

## 2020-11-02 ASSESSMENT — PAIN DESCRIPTION - LOCATION: LOCATION: KNEE

## 2020-11-02 ASSESSMENT — PAIN DESCRIPTION - FREQUENCY: FREQUENCY: CONTINUOUS

## 2020-11-02 ASSESSMENT — PAIN DESCRIPTION - PAIN TYPE: TYPE: ACUTE PAIN

## 2020-11-02 ASSESSMENT — PAIN DESCRIPTION - ORIENTATION: ORIENTATION: LEFT

## 2020-11-02 ASSESSMENT — PAIN SCALES - GENERAL: PAINLEVEL_OUTOF10: 7

## 2020-11-02 NOTE — ED NOTES
Presents to ED for left knee pain x1 week with NKI. Rates pain 7/10. Ambulated to ED room A with stable gait wearing a knee brace from home. Shows no signs of distress. Vital signs as noted.       Debra Franco RN  11/02/20 8619

## 2020-11-03 PROBLEM — E78.5 HYPERLIPIDEMIA: Status: RESOLVED | Noted: 2017-03-14 | Resolved: 2020-11-03

## 2020-11-09 ASSESSMENT — ENCOUNTER SYMPTOMS
SHORTNESS OF BREATH: 0
NAUSEA: 0
VOMITING: 0
COLOR CHANGE: 0
COUGH: 0

## 2020-11-09 NOTE — ED PROVIDER NOTES
[facility] Emergency Department    CHIEF COMPLAINT       Chief Complaint   Patient presents with    Knee Pain     left       Nurses Notes reviewed and I agree except as noted in the HPI. HISTORY OF PRESENT ILLNESS    Madai Finn johnny 54 y.o. female who presents to the ED for evaluation of left knee pain. Pain started one week ago. Denies injury. Has been wearing a knee brace from home. No redness, no fever. HPI was provided by the patient. REVIEW OF SYSTEMS     Review of Systems   Constitutional: Negative for fever. Respiratory: Negative for cough and shortness of breath. Gastrointestinal: Negative for nausea and vomiting. Musculoskeletal: Positive for arthralgias, gait problem and joint swelling. Negative for myalgias, neck pain and neck stiffness. Skin: Negative for color change and rash. PAST MEDICAL HISTORY     Past Medical History:   Diagnosis Date    Allergic rhinitis     Asthma     Diabetes ([facility] [address]     Psychiatric problem     depression       SURGICALHISTORY      has a past surgical history that includes  section; Hysterectomy; and Carpal tunnel release.     CURRENT MEDICATIONS       Discharge Medication List as of 2020  6:33 PM      CONTINUE these medications which have NOT CHANGED    Details   atorvastatin (LIPITOR) 40 MG tablet TAKE ONE TABLET BY MOUTH DAILY, Disp-30 tablet, R-3Normal      metFORMIN (GLUCOPHAGE) 1000 MG tablet TAKE ONE TABLET BY MOUTH TWICE A DAY WITH MEALS, Disp-60 tablet, R-3Normal      ibuprofen (ADVIL;MOTRIN) 600 MG tablet Take 1 tablet by mouth every 6 hours as needed for Pain, Disp-30 tablet, R-0Print      hydrOXYzine (ATARAX) 10 MG tablet Take 10 mg by mouth 3 times daily as needed for ItchingHistorical Med      Blood Glucose Monitoring Suppl (ACCU-CHEK ROD PLUS) w/Device KIT Disp-1 kit, R-0, NormalCheck blood sugar once daily      glucose blood VI test strips (ACCU-CHEK ROD PLUS) strip Disp-100 each, R-3, NormalCheck blood sugar once daily      Lancets MISC Disp-100 each, R-1, NormalUse to test blood glucose level 1 time per day. Diagnosis: E11.9      Cetirizine HCl (ZYRTEC ALLERGY) 10 MG CAPS Take 1 tablet by mouth dailyHistorical Med      FLUoxetine (PROZAC) 20 MG capsule Take 20 mg by mouth daily      montelukast (SINGULAIR) 10 MG tablet Take 10 mg by mouth nightly      rOPINIRole (REQUIP) 0.25 MG tablet Take 1 mg by mouth nightly      albuterol sulfate  (90 BASE) MCG/ACT inhaler Inhale 2 puffs into the lungs every 6 hours as needed for Wheezing      calcium-vitamin D (OSCAL-500) 500-200 MG-UNIT per tablet Take 1 tablet by mouth daily             ALLERGIES     is allergic to latex; keflex [cephalexin]; betadine [povidone iodine]; neosporin [neomycin-polymyxin-gramicidin]; pcn [penicillins]; and rocephin [ceftriaxone]. FAMILY HISTORY     She indicated that her mother is alive. She indicated that her father is . She indicated that her sister is alive. She indicated that her maternal uncle is . family history includes Cancer in her father and maternal uncle; Depression in her mother; Diabetes in her sister; Thyroid Disease in her mother.     SOCIAL HISTORY       Social History     Socioeconomic History    Marital status:      Spouse name: Not on file    Number of children: 3    Years of education: Not on file    Highest education level: Not on file   Occupational History    Not on file   Social Needs    Financial resource strain: Not on file    Food insecurity     Worry: Not on file     Inability: Not on file    Transportation needs     Medical: Not on file     Non-medical: Not on file   Tobacco Use    Smoking status: Never Smoker    Smokeless tobacco: Never Used   Substance and Sexual Activity    Alcohol use: Yes     Comment: socially    Drug use: No    Sexual activity: Yes   Lifestyle    Physical activity     Days per week: Not on file     Minutes per session: Not on Department Physician who eithersigns or Co-signs this chart in the absence of a cardiologist.        RADIOLOGY: non-plainfilm images(s) such as CT, Ultrasound and MRI are read by the radiologist.  Plain radiographic images are visualized and preliminarily interpreted by the emergency physician unless otherwise stated below. XR KNEE LEFT (MIN 4 VIEWS)   Final Result   No acute fracture or dislocation. **This report has been created using voice recognition software. It may contain minor errors which are inherent in voice recognition technology. **      Final report electronically signed by Dr. Matilda Johnson on 11/2/2020 6:00 PM            LABS:   Labs Reviewed - No data to display    EMERGENCY DEPARTMENT COURSE:   Vitals:    Vitals:    11/02/20 1702   BP: (!) 142/90   Pulse: 83   Resp: 16   Temp: 98.4 °F (36.9 °C)   TempSrc: Oral   SpO2: 95%   Weight: 260 lb (117.9 kg)   Height: 5' 5\" (1.651 m)          MDM                       Seen evaluate in the emergency room for left knee pain. Appropriate imaging is ordered and reviewed. No fractures or dislocations are noted. Patient does have joint line tenderness. She does have a palpable amount of fluid on the anterior surface of the knee. Signs of infectious process are present. No redness, no warmth. Placed in the hinged knee support. Instructed follow-up with orthopedics. Patient verbalized understanding. Medications - No data to display      Patient was seenindependently by myself. The patient's final impression and disposition and plan was determined by myself. CRITICAL CARE:   None    CONSULTS:  None    PROCEDURES:  None    FINAL IMPRESSION     1. Strain of left knee, initial encounter    2.  Knee effusion, left          DISPOSITION/PLAN   DISPOSITION Decision To Discharge 11/02/2020 06:32:29 PM      PATIENT REFERREDTO:  [address]  [phone]  Call in 1 day  For follow up      DISCHARGE MEDICATIONS:  Discharge Medication List as of 11/2/2020  6:33 PM          (Please note that portions of this note were completed with a voice recognition program.  Efforts were made to edit the dictations but occasionally words are mis-transcribed.)         KATHERIN Steen CNP, APRN - CNP  11/09/20 0034

## 2020-11-29 ENCOUNTER — HOSPITAL ENCOUNTER (EMERGENCY)
Age: 45
Discharge: HOME | End: 2020-11-29
Payer: MEDICAID

## 2020-11-29 ENCOUNTER — APPOINTMENT (OUTPATIENT)
Dept: GENERAL RADIOLOGY | Age: 45
End: 2020-11-29
Payer: MEDICAID

## 2020-11-29 ENCOUNTER — NURSE TRIAGE (OUTPATIENT)
Dept: OTHER | Facility: CLINIC | Age: 45
End: 2020-11-29

## 2020-11-29 VITALS
HEART RATE: 98 BPM | BODY MASS INDEX: 43.32 KG/M2 | HEIGHT: 65 IN | DIASTOLIC BLOOD PRESSURE: 64 MMHG | WEIGHT: 260 LBS | TEMPERATURE: 99.9 F | RESPIRATION RATE: 23 BRPM | OXYGEN SATURATION: 96 % | SYSTOLIC BLOOD PRESSURE: 107 MMHG

## 2020-11-29 LAB
ALBUMIN SERPL-MCNC: 3.6 G/DL (ref 3.5–5.1)
ALP BLD-CCNC: 80 U/L (ref 38–126)
ALT SERPL-CCNC: 24 U/L (ref 11–66)
ANION GAP SERPL CALCULATED.3IONS-SCNC: 13 MEQ/L (ref 8–16)
AST SERPL-CCNC: 28 U/L (ref 5–40)
BASOPHILS # BLD: 0.3 %
BASOPHILS ABSOLUTE: 0 THOU/MM3 (ref 0–0.1)
BILIRUB SERPL-MCNC: 0.7 MG/DL (ref 0.3–1.2)
BUN BLDV-MCNC: 11 MG/DL (ref 7–22)
CALCIUM SERPL-MCNC: 8.5 MG/DL (ref 8.5–10.5)
CHLORIDE BLD-SCNC: 96 MEQ/L (ref 98–111)
CO2: 22 MEQ/L (ref 23–33)
CREAT SERPL-MCNC: 0.5 MG/DL (ref 0.4–1.2)
EOSINOPHIL # BLD: 0.5 %
EOSINOPHILS ABSOLUTE: 0 THOU/MM3 (ref 0–0.4)
ERYTHROCYTE [DISTWIDTH] IN BLOOD BY AUTOMATED COUNT: 13.2 % (ref 11.5–14.5)
ERYTHROCYTE [DISTWIDTH] IN BLOOD BY AUTOMATED COUNT: 41.3 FL (ref 35–45)
GFR SERPL CREATININE-BSD FRML MDRD: > 90 ML/MIN/1.73M2
GLUCOSE BLD-MCNC: 224 MG/DL (ref 70–108)
HCT VFR BLD CALC: 40.2 % (ref 37–47)
HEMOGLOBIN: 13.5 GM/DL (ref 12–16)
IMMATURE GRANS (ABS): 0.03 THOU/MM3 (ref 0–0.07)
IMMATURE GRANULOCYTES: 0.5 %
LIPASE: 17.8 U/L (ref 5.6–51.3)
LYMPHOCYTES # BLD: 14.5 %
LYMPHOCYTES ABSOLUTE: 0.9 THOU/MM3 (ref 1–4.8)
MCH RBC QN AUTO: 29 PG (ref 26–33)
MCHC RBC AUTO-ENTMCNC: 33.6 GM/DL (ref 32.2–35.5)
MCV RBC AUTO: 86.5 FL (ref 81–99)
MONOCYTES # BLD: 7 %
MONOCYTES ABSOLUTE: 0.4 THOU/MM3 (ref 0.4–1.3)
NUCLEATED RED BLOOD CELLS: 0 /100 WBC
OSMOLALITY CALCULATION: 269 MOSMOL/KG (ref 275–300)
PLATELET # BLD: 182 THOU/MM3 (ref 130–400)
PMV BLD AUTO: 11.1 FL (ref 9.4–12.4)
POTASSIUM REFLEX MAGNESIUM: 4.4 MEQ/L (ref 3.5–5.2)
RBC # BLD: 4.65 MILL/MM3 (ref 4.2–5.4)
SEG NEUTROPHILS: 77.2 %
SEGMENTED NEUTROPHILS ABSOLUTE COUNT: 4.7 THOU/MM3 (ref 1.8–7.7)
SODIUM BLD-SCNC: 131 MEQ/L (ref 135–145)
TOTAL PROTEIN: 7.2 G/DL (ref 6.1–8)
WBC # BLD: 6.1 THOU/MM3 (ref 4.8–10.8)

## 2020-11-29 PROCEDURE — 83690 ASSAY OF LIPASE: CPT

## 2020-11-29 PROCEDURE — 96361 HYDRATE IV INFUSION ADD-ON: CPT

## 2020-11-29 PROCEDURE — 2580000003 HC RX 258: Performed by: EMERGENCY MEDICINE

## 2020-11-29 PROCEDURE — 71045 X-RAY EXAM CHEST 1 VIEW: CPT

## 2020-11-29 PROCEDURE — 96374 THER/PROPH/DIAG INJ IV PUSH: CPT

## 2020-11-29 PROCEDURE — 99285 EMERGENCY DEPT VISIT HI MDM: CPT

## 2020-11-29 PROCEDURE — 96375 TX/PRO/DX INJ NEW DRUG ADDON: CPT

## 2020-11-29 PROCEDURE — 80053 COMPREHEN METABOLIC PANEL: CPT

## 2020-11-29 PROCEDURE — 36415 COLL VENOUS BLD VENIPUNCTURE: CPT

## 2020-11-29 PROCEDURE — 6360000002 HC RX W HCPCS: Performed by: EMERGENCY MEDICINE

## 2020-11-29 PROCEDURE — 85025 COMPLETE CBC W/AUTO DIFF WBC: CPT

## 2020-11-29 RX ORDER — DIPHENHYDRAMINE HYDROCHLORIDE 50 MG/ML
25 INJECTION INTRAMUSCULAR; INTRAVENOUS ONCE
Status: COMPLETED | OUTPATIENT
Start: 2020-11-29 | End: 2020-11-29

## 2020-11-29 RX ORDER — CHOLECALCIFEROL (VITAMIN D3) 125 MCG
5 CAPSULE ORAL DAILY
COMMUNITY
End: 2021-08-20

## 2020-11-29 RX ORDER — KETOROLAC TROMETHAMINE 30 MG/ML
30 INJECTION, SOLUTION INTRAMUSCULAR; INTRAVENOUS ONCE
Status: COMPLETED | OUTPATIENT
Start: 2020-11-29 | End: 2020-11-29

## 2020-11-29 RX ORDER — ONDANSETRON 4 MG/1
4 TABLET, ORALLY DISINTEGRATING ORAL EVERY 8 HOURS PRN
Qty: 15 TABLET | Refills: 0 | Status: SHIPPED | OUTPATIENT
Start: 2020-11-29 | End: 2021-08-20

## 2020-11-29 RX ORDER — ONDANSETRON 2 MG/ML
INJECTION INTRAMUSCULAR; INTRAVENOUS
Status: DISCONTINUED
Start: 2020-11-29 | End: 2020-11-29 | Stop reason: WASHOUT

## 2020-11-29 RX ORDER — 0.9 % SODIUM CHLORIDE 0.9 %
1000 INTRAVENOUS SOLUTION INTRAVENOUS ONCE
Status: COMPLETED | OUTPATIENT
Start: 2020-11-29 | End: 2020-11-29

## 2020-11-29 RX ORDER — METOCLOPRAMIDE HYDROCHLORIDE 5 MG/ML
10 INJECTION INTRAMUSCULAR; INTRAVENOUS ONCE
Status: COMPLETED | OUTPATIENT
Start: 2020-11-29 | End: 2020-11-29

## 2020-11-29 RX ADMIN — METOCLOPRAMIDE 10 MG: 5 INJECTION, SOLUTION INTRAMUSCULAR; INTRAVENOUS at 11:17

## 2020-11-29 RX ADMIN — SODIUM CHLORIDE 1000 ML: 9 INJECTION, SOLUTION INTRAVENOUS at 11:17

## 2020-11-29 RX ADMIN — DIPHENHYDRAMINE HYDROCHLORIDE 25 MG: 50 INJECTION INTRAMUSCULAR; INTRAVENOUS at 11:18

## 2020-11-29 RX ADMIN — KETOROLAC TROMETHAMINE 30 MG: 30 INJECTION, SOLUTION INTRAMUSCULAR; INTRAVENOUS at 11:17

## 2020-11-29 ASSESSMENT — ENCOUNTER SYMPTOMS
COLOR CHANGE: 0
SORE THROAT: 0
DIARRHEA: 1
COUGH: 1
ABDOMINAL DISTENTION: 0
SHORTNESS OF BREATH: 0
ABDOMINAL PAIN: 0
VOMITING: 1
NAUSEA: 1

## 2020-11-29 ASSESSMENT — PAIN DESCRIPTION - PAIN TYPE
TYPE: ACUTE PAIN

## 2020-11-29 ASSESSMENT — PAIN SCALES - GENERAL
PAINLEVEL_OUTOF10: 10
PAINLEVEL_OUTOF10: 10
PAINLEVEL_OUTOF10: 2
PAINLEVEL_OUTOF10: 7

## 2020-11-29 ASSESSMENT — PAIN DESCRIPTION - LOCATION
LOCATION: HEAD

## 2020-11-29 ASSESSMENT — PAIN DESCRIPTION - ORIENTATION: ORIENTATION: RIGHT

## 2020-11-29 NOTE — ED NOTES
Patient resting on cart with room lights dimmed. Patient states nausea and headache have decreased with medications given. Respirations unlabored. Patient updated on plan of care. Call light in reach.      Petey Krishna RN  11/29/20 2660

## 2020-11-29 NOTE — ED PROVIDER NOTES
[facility] COMPLAINT       Chief Complaint   Patient presents with    Emesis    Diarrhea    Other     Covid +       Nurses Notes reviewed and I agree except as noted in the HPI. HISTORY OF PRESENT ILLNESS    Harriet Cote is a 54 y.o. female who presents to the Emergency Department for the evaluation of worsening symptoms of COVID-19. The patient states she was diagnosed earlier this week with Covid. She initially had a loss of taste and smell. This is developed into a severe nausea, vomiting, a couple episodes of diarrhea. She also complains of severe headache, cough, body aches. No known fever at home. Patient states that she feels \"awful\" with myalgias throughout the entire body. The patient is not specifically short of breath. Her main concern is her headache, myalgias, vomiting. The HPI was provided by the patient. REVIEW OF SYSTEMS     Review of Systems   Constitutional: Positive for fatigue. Negative for fever. HENT: Negative for sore throat. Respiratory: Positive for cough. Negative for shortness of breath. Cardiovascular: Negative for chest pain and leg swelling. Gastrointestinal: Positive for diarrhea, nausea and vomiting. Negative for abdominal distention and abdominal pain. Genitourinary: Negative for difficulty urinating, dysuria and frequency. Musculoskeletal: Positive for arthralgias and myalgias. Skin: Negative for color change. Neurological: Positive for headaches. Psychiatric/Behavioral: Negative for behavioral problems and confusion. PAST MEDICAL HISTORY    has a past medical history of Allergic rhinitis, Asthma, Diabetes ([facility] [address] and Psychiatric problem. SURGICAL HISTORY      has a past surgical history that includes  section; Hysterectomy; and Carpal tunnel release.     CURRENT MEDICATIONS       Discharge Medication List as of 2020  1:11 PM      CONTINUE these medications which have NOT CHANGED    Details   melatonin 5 MG TABS tablet Take 5 mg by mouth dailyHistorical Med      atorvastatin (LIPITOR) 40 MG tablet TAKE ONE TABLET BY MOUTH DAILY, Disp-30 tablet, R-3Normal      metFORMIN (GLUCOPHAGE) 1000 MG tablet TAKE ONE TABLET BY MOUTH TWICE A DAY WITH MEALS, Disp-60 tablet, R-3Normal      hydrOXYzine (ATARAX) 10 MG tablet Take 10 mg by mouth 3 times daily as needed for ItchingHistorical Med      FLUoxetine (PROZAC) 20 MG capsule Take 20 mg by mouth daily      montelukast (SINGULAIR) 10 MG tablet Take 10 mg by mouth nightly      rOPINIRole (REQUIP) 0.25 MG tablet Take 1 mg by mouth nightly      calcium-vitamin D (OSCAL-500) 500-200 MG-UNIT per tablet Take 1 tablet by mouth daily      ibuprofen (ADVIL;MOTRIN) 600 MG tablet Take 1 tablet by mouth every 6 hours as needed for Pain, Disp-30 tablet, R-0Print      Blood Glucose Monitoring Suppl (ACCU-CHEK ROD PLUS) w/Device KIT Disp-1 kit, R-0, NormalCheck blood sugar once daily      glucose blood VI test strips (ACCU-CHEK ROD PLUS) strip Disp-100 each, R-3, NormalCheck blood sugar once daily      Lancets MISC Disp-100 each, R-1, NormalUse to test blood glucose level 1 time per day. Diagnosis: E11.9      Cetirizine HCl (ZYRTEC ALLERGY) 10 MG CAPS Take 1 tablet by mouth dailyHistorical Med      albuterol sulfate  (90 BASE) MCG/ACT inhaler Inhale 2 puffs into the lungs every 6 hours as needed for Wheezing             ALLERGIES     is allergic to latex; keflex [cephalexin]; betadine [povidone iodine]; neosporin [neomycin-polymyxin-gramicidin]; pcn [penicillins]; and rocephin [ceftriaxone]. FAMILY HISTORY     She indicated that her mother is alive. She indicated that her father is . She indicated that her sister is alive. She indicated that her maternal uncle is . family history includes Cancer in her father and maternal uncle; Depression in her mother; Diabetes in her sister;  Thyroid Disease in her mother. SOCIAL HISTORY      reports that she has never smoked. She has never used smokeless tobacco. She reports current alcohol use. She reports that she does not use drugs. PHYSICAL EXAM     INITIAL VITALS:  height is 5' 5\" (1.651 m) and weight is 260 lb (117.9 kg). Her oral temperature is 99.9 °F (37.7 °C). Her blood pressure is 107/64 and her pulse is 98. Her respiration is 23 and oxygen saturation is 96%. Physical Exam  Constitutional:       General: She is not in acute distress. Appearance: She is obese. She is ill-appearing. She is not toxic-appearing or diaphoretic. HENT:      Head: Normocephalic. Nose: Nose normal.      Mouth/Throat:      Mouth: Mucous membranes are moist.   Eyes:      Pupils: Pupils are equal, round, and reactive to light. Cardiovascular:      Rate and Rhythm: Normal rate and regular rhythm. Pulses: Normal pulses. Heart sounds: Normal heart sounds. Pulmonary:      Effort: Pulmonary effort is normal. No respiratory distress. Breath sounds: Decreased breath sounds present. Abdominal:      General: Bowel sounds are normal. There is no distension. Tenderness: There is no abdominal tenderness. There is no guarding. Skin:     General: Skin is warm and dry. Capillary Refill: Capillary refill takes less than 2 seconds. Coloration: Skin is not jaundiced or pale. Neurological:      General: No focal deficit present. Mental Status: She is alert.    Psychiatric:         Mood and Affect: Mood normal.          DIFFERENTIAL DIAGNOSIS:   COVID-19 disease, dehydration, less likely pneumonia, intracranial hemorrhage, migraine headache, tension headache, encephalitis    DIAGNOSTIC RESULTS     EKG: All EKG's are interpreted by the Emergency Department Physician who either signs or Co-signs this chart in the absence of a cardiologist.    None    RADIOLOGY: non-plainfilm images(s) such as CT, Ultrasound and MRI are read by the radiologist.    XR CHEST PORTABLE   Final Result   Stable radiographic appearance of the chest. No evidence of an acute process. **This report has been created using voice recognition software. It may contain minor errors which are inherent in voice recognition technology. **      Final report electronically signed by Dr. Yolande Blair on 11/29/2020 11:55 AM          LABS:     Labs Reviewed   CBC WITH AUTO DIFFERENTIAL - Abnormal; Notable for the following components:       Result Value    Lymphocytes Absolute 0.9 (*)     All other components within normal limits   COMPREHENSIVE METABOLIC PANEL W/ REFLEX TO MG FOR LOW K - Abnormal; Notable for the following components:    Glucose 224 (*)     Sodium 131 (*)     Chloride 96 (*)     CO2 22 (*)     All other components within normal limits   OSMOLALITY - Abnormal; Notable for the following components:    Osmolality Calc 269.0 (*)     All other components within normal limits   LIPASE   ANION GAP   GLOMERULAR FILTRATION RATE, ESTIMATED       EMERGENCY DEPARTMENT COURSE:   Vitals:    Vitals:    11/29/20 1148 11/29/20 1248 11/29/20 1333 11/29/20 1335   BP: 117/70 (!) 137/94 107/64    Pulse: 85 88 84 98   Resp: 14 21 16 23   Temp:       TempSrc:       SpO2: 93% 95%  96%   Weight:       Height:           11:26 AM EST: The patient was seen and evaluated. Patient is given a bolus of IV fluids. She is given Reglan, Benadryl, Toradol for her symptoms. Appropriate labs and chest x-ray are ordered. Patient's labs are reassuring. Her white blood cell count of 6.1. H&H remained stable. Liver enzymes within normal limits. Electrolytes essentially normal with mild hyponatremia sodium 131. Kidney function remains good. Chest x-ray shows no acute process. MDM:  The patient has COVID-19. She is treated in the emergency department for her symptoms with IV fluids, Reglan, Benadryl, Toradol. She states her headache and body aches are much better.   She states she still feels fatigued but currently has no fever. She is not short of breath. Her O2 saturation remains 96% on room air. Patient is suitable for discharge. She will be discharged home. I will prescribe Zofran for her to have at home for nausea and vomiting. Patient has no respiratory symptoms so does not require dexamethasone. I offered to prescribe vitamin C, vitamin D, zinc, patient declined these medications. Patient is advised to drink small amounts of fluids frequently. Alternate Tylenol and ibuprofen for pain control. Follow-up family physician if no better 3 to 5 days. Return if worse. CRITICAL CARE:   None    CONSULTS:  None    PROCEDURES:  None    FINAL IMPRESSION      1. COVID-19 virus infection          DISPOSITION/PLAN   Discharge    PATIENT REFERRED TO:  No follow-up provider specified. DISCHARGE MEDICATIONS:  Discharge Medication List as of 11/29/2020  1:11 PM      START taking these medications    Details   ondansetron (ZOFRAN ODT) 4 MG disintegrating tablet Take 1 tablet by mouth every 8 hours as needed for Nausea or Vomiting, Disp-15 tablet,R-0Normal             (Please note that portions of this note were completed with a voice recognition program.  Efforts were made to edit the dictations but occasionally words are mis-transcribed.)    The patient was given an opportunity to see the Emergency Attending. The patient voiced understanding that I was a Mid-LevelProvider and was in agreement with being seen independently by myself.           KATHERIN De Jesus - CNP  11/29/20 0912

## 2020-11-29 NOTE — ED NOTES
Patient presents to ED via EMS with complaint of Covid+, nausea, vomiting and diarrhea. Patient states she was tested positive on Tuesday. Droplet Plus Precautions placed in Negative pressure room.      Aretha Vasques RN  11/29/20 9337

## 2020-11-29 NOTE — ED NOTES
Bed: 015A  Expected date: 11/29/20  Expected time: 10:33 AM  Means of arrival: [address] [facility]  Comments:     Gisela Rees RN  11/29/20 5631

## 2020-11-30 NOTE — TELEPHONE ENCOUNTER
Reason for Disposition   [1] Difficult to awaken or acting confused (e.g., disoriented, slurred speech) AND [2] present now AND [3] new onset    Answer Assessment - Initial Assessment Questions  1. LEVEL OF CONSCIOUSNESS: \"How is he (she, the patient) acting right now? \" (e.g., alert-oriented, confused, lethargic, stuporous, comatose)      Acting confused or hallucinating   2. ONSET: \"When did the confusion start? \"  (minutes, hours, days)      2120  3. PATTERN \"Does this come and go, or has it been constant since it started? \"  \"Is it present now? \"      Present now  4. ALCOHOL or DRUGS: \"Has he been drinking alcohol or taking any drugs? \"       no  5. NARCOTIC MEDICATIONS: \"Has he been receiving any narcotic medications? \" (e.g., morphine, Vicodin)      no  6. CAUSE: \"What do you think is causing the confusion? \"       Maybe Zofran with other medications. 7. OTHER SYMPTOMS: \"Are there any other symptoms? \" (e.g., difficulty breathing, headache, fever, weakness)      Unsure    Protocols used: CONFUSION - DELIRIUM-ADULT-AH    Caller states took pt to ED today for Headache and COVID like symptoms. Caller states pt is now confused and hallucinating, not making any sense. No difficulty breathing, had a high BS at [address] verbalized understanding of care advice and agrees to follow disposition. Recommendation Call 911. Attention provider: Your patient utilized nurse triage services offered by employer, payer or community. This encounter includes an overview of the reason for call, assessment and recommended disposition. Please do not respond through this encounter as the response is not directed to a shared pool.

## 2020-12-05 ENCOUNTER — APPOINTMENT (OUTPATIENT)
Dept: GENERAL RADIOLOGY | Age: 45
End: 2020-12-05
Payer: COMMERCIAL

## 2020-12-05 ENCOUNTER — HOSPITAL ENCOUNTER (EMERGENCY)
Age: 45
Discharge: HOME | End: 2020-12-05
Attending: EMERGENCY MEDICINE
Payer: COMMERCIAL

## 2020-12-05 VITALS
RESPIRATION RATE: 19 BRPM | HEART RATE: 86 BPM | OXYGEN SATURATION: 97 % | TEMPERATURE: 97.9 F | DIASTOLIC BLOOD PRESSURE: 89 MMHG | SYSTOLIC BLOOD PRESSURE: 122 MMHG

## 2020-12-05 LAB
ALBUMIN SERPL-MCNC: 3.5 G/DL (ref 3.5–5.1)
ALP BLD-CCNC: 89 U/L (ref 38–126)
ALT SERPL-CCNC: 19 U/L (ref 11–66)
ANION GAP SERPL CALCULATED.3IONS-SCNC: 14 MEQ/L (ref 8–16)
AST SERPL-CCNC: 23 U/L (ref 5–40)
BASOPHILS # BLD: 0.7 %
BASOPHILS ABSOLUTE: 0.1 THOU/MM3 (ref 0–0.1)
BILIRUB SERPL-MCNC: 0.4 MG/DL (ref 0.3–1.2)
BUN BLDV-MCNC: 14 MG/DL (ref 7–22)
CALCIUM SERPL-MCNC: 9.5 MG/DL (ref 8.5–10.5)
CHLORIDE BLD-SCNC: 103 MEQ/L (ref 98–111)
CO2: 22 MEQ/L (ref 23–33)
CREAT SERPL-MCNC: 0.6 MG/DL (ref 0.4–1.2)
EKG ATRIAL RATE: 76 BPM
EKG P AXIS: 18 DEGREES
EKG P-R INTERVAL: 166 MS
EKG Q-T INTERVAL: 372 MS
EKG QRS DURATION: 70 MS
EKG QTC CALCULATION (BAZETT): 418 MS
EKG R AXIS: -8 DEGREES
EKG T AXIS: 41 DEGREES
EKG VENTRICULAR RATE: 76 BPM
EOSINOPHIL # BLD: 4.3 %
EOSINOPHILS ABSOLUTE: 0.3 THOU/MM3 (ref 0–0.4)
ERYTHROCYTE [DISTWIDTH] IN BLOOD BY AUTOMATED COUNT: 12.8 % (ref 11.5–14.5)
ERYTHROCYTE [DISTWIDTH] IN BLOOD BY AUTOMATED COUNT: 41 FL (ref 35–45)
GFR SERPL CREATININE-BSD FRML MDRD: > 90 ML/MIN/1.73M2
GLUCOSE BLD-MCNC: 178 MG/DL (ref 70–108)
GLUCOSE BLD-MCNC: 184 MG/DL (ref 70–108)
HCT VFR BLD CALC: 38.4 % (ref 37–47)
HEMOGLOBIN: 12.6 GM/DL (ref 12–16)
IMMATURE GRANS (ABS): 0.07 THOU/MM3 (ref 0–0.07)
IMMATURE GRANULOCYTES: 1 %
LYMPHOCYTES # BLD: 20.2 %
LYMPHOCYTES ABSOLUTE: 1.5 THOU/MM3 (ref 1–4.8)
MCH RBC QN AUTO: 28.9 PG (ref 26–33)
MCHC RBC AUTO-ENTMCNC: 32.8 GM/DL (ref 32.2–35.5)
MCV RBC AUTO: 88.1 FL (ref 81–99)
MONOCYTES # BLD: 6.1 %
MONOCYTES ABSOLUTE: 0.4 THOU/MM3 (ref 0.4–1.3)
NUCLEATED RED BLOOD CELLS: 0 /100 WBC
OSMOLALITY CALCULATION: 282.8 MOSMOL/KG (ref 275–300)
PLATELET # BLD: 303 THOU/MM3 (ref 130–400)
PMV BLD AUTO: 10.8 FL (ref 9.4–12.4)
POTASSIUM REFLEX MAGNESIUM: 4.3 MEQ/L (ref 3.5–5.2)
PROCALCITONIN: 0.05 NG/ML (ref 0.01–0.09)
RBC # BLD: 4.36 MILL/MM3 (ref 4.2–5.4)
SEG NEUTROPHILS: 67.7 %
SEGMENTED NEUTROPHILS ABSOLUTE COUNT: 4.9 THOU/MM3 (ref 1.8–7.7)
SODIUM BLD-SCNC: 139 MEQ/L (ref 135–145)
TOTAL PROTEIN: 7.1 G/DL (ref 6.1–8)
TROPONIN T: < 0.01 NG/ML
WBC # BLD: 7.2 THOU/MM3 (ref 4.8–10.8)

## 2020-12-05 PROCEDURE — 82948 REAGENT STRIP/BLOOD GLUCOSE: CPT

## 2020-12-05 PROCEDURE — 6360000002 HC RX W HCPCS: Performed by: EMERGENCY MEDICINE

## 2020-12-05 PROCEDURE — 85025 COMPLETE CBC W/AUTO DIFF WBC: CPT

## 2020-12-05 PROCEDURE — 36415 COLL VENOUS BLD VENIPUNCTURE: CPT

## 2020-12-05 PROCEDURE — 96374 THER/PROPH/DIAG INJ IV PUSH: CPT

## 2020-12-05 PROCEDURE — 71045 X-RAY EXAM CHEST 1 VIEW: CPT

## 2020-12-05 PROCEDURE — 99284 EMERGENCY DEPT VISIT MOD MDM: CPT

## 2020-12-05 PROCEDURE — 93005 ELECTROCARDIOGRAM TRACING: CPT | Performed by: EMERGENCY MEDICINE

## 2020-12-05 PROCEDURE — 2580000003 HC RX 258: Performed by: EMERGENCY MEDICINE

## 2020-12-05 PROCEDURE — 96361 HYDRATE IV INFUSION ADD-ON: CPT

## 2020-12-05 PROCEDURE — 84484 ASSAY OF TROPONIN QUANT: CPT

## 2020-12-05 PROCEDURE — 80053 COMPREHEN METABOLIC PANEL: CPT

## 2020-12-05 PROCEDURE — 84145 PROCALCITONIN (PCT): CPT

## 2020-12-05 RX ORDER — ACETAMINOPHEN 160 MG
1 TABLET,DISINTEGRATING ORAL 2 TIMES DAILY
Qty: 30 CAPSULE | Refills: 0 | Status: SHIPPED | OUTPATIENT
Start: 2020-12-05 | End: 2021-08-20

## 2020-12-05 RX ORDER — MULTIVIT WITH MINERALS/LUTEIN
1000 TABLET ORAL 2 TIMES DAILY
Qty: 30 TABLET | Refills: 3 | Status: SHIPPED | OUTPATIENT
Start: 2020-12-05 | End: 2021-08-20

## 2020-12-05 RX ORDER — 0.9 % SODIUM CHLORIDE 0.9 %
1000 INTRAVENOUS SOLUTION INTRAVENOUS ONCE
Status: COMPLETED | OUTPATIENT
Start: 2020-12-05 | End: 2020-12-05

## 2020-12-05 RX ORDER — KETOROLAC TROMETHAMINE 30 MG/ML
15 INJECTION, SOLUTION INTRAMUSCULAR; INTRAVENOUS ONCE
Status: COMPLETED | OUTPATIENT
Start: 2020-12-05 | End: 2020-12-05

## 2020-12-05 RX ADMIN — SODIUM CHLORIDE 1000 ML: 9 INJECTION, SOLUTION INTRAVENOUS at 13:13

## 2020-12-05 RX ADMIN — KETOROLAC TROMETHAMINE 15 MG: 30 INJECTION, SOLUTION INTRAMUSCULAR; INTRAVENOUS at 13:13

## 2020-12-05 ASSESSMENT — ENCOUNTER SYMPTOMS
ABDOMINAL DISTENTION: 0
VOMITING: 0
COLOR CHANGE: 0
COUGH: 0
BACK PAIN: 0
SHORTNESS OF BREATH: 1
DIARRHEA: 0
ABDOMINAL PAIN: 0
NAUSEA: 1

## 2020-12-05 ASSESSMENT — PAIN SCALES - GENERAL
PAINLEVEL_OUTOF10: 6

## 2020-12-05 ASSESSMENT — PAIN DESCRIPTION - DESCRIPTORS: DESCRIPTORS: ACHING

## 2020-12-05 ASSESSMENT — PAIN DESCRIPTION - LOCATION: LOCATION: CHEST

## 2020-12-05 ASSESSMENT — PAIN DESCRIPTION - PAIN TYPE: TYPE: ACUTE PAIN

## 2020-12-05 NOTE — ED NOTES
Patient to ED for worsening covid symptoms. Patient tested positive for covid 1 week ago. Patient states her chest now hurts to take a deep breath and feels more SOB. . Patient reports she is now afebrile denies diarrhea however feels nauseated.  Patient alert and oriented       Janay Heredia RN  12/05/20 6812

## 2020-12-05 NOTE — ED NOTES
Upon first contact with patient this RN receives bedside shift report Tommy Chandler.         [address], RN  12/05/20 3878

## 2020-12-05 NOTE — ED PROVIDER NOTES
[facility]  EMERGENCY DEPARTMENT ENCOUNTER          CHIEF COMPLAINT     No chief complaint on file. Nurses Notes reviewed and I agree except as noted in the HPI. HISTORY OF PRESENT ILLNESS    Nolan Navarro is a 54 y.o. female who presents to the Emergency Department for the evaluation of generalized body aches, concerns for dehydration, chest pain with a deep breath, dyspnea with exertion. Symptoms increasing times the past 6 days. The patient is Covid positive. She has tested positive approximately 8 days ago. She was in the ED 6 days ago for similar complaints. Patient had a severe headache at that point which has improved. She continues to have body aches, chest pain with deep breath, no chest pain at rest, no shortness of breath at rest but dyspnea with exertion. Patient is a type II diabetic. Her Accu-Chek machine at home is without batteries so she cannot check her blood sugars. She is concerned that her blood sugars may be running high. Patient states her temperature at home was 98.6. She stopped having fever. She does not have significant cough. She does not have significant shortness of breath. She generally does not feel well. She does break out into sweats occasionally. The HPI was provided by the patient. REVIEW OF SYSTEMS     Review of Systems   Constitutional: Positive for chills, diaphoresis and fatigue. Negative for fever. Respiratory: Positive for shortness of breath (with exertion). Negative for cough. Cardiovascular: Positive for chest pain (with deep breath). Negative for palpitations and leg swelling. Gastrointestinal: Positive for nausea. Negative for abdominal distention, abdominal pain, diarrhea and vomiting. Genitourinary: Negative for dysuria, frequency and urgency. Musculoskeletal: Positive for arthralgias and myalgias. Negative for back pain. Skin: Negative for color change. Neurological: Negative for headaches. PAST MEDICAL HISTORY    has a past medical history of Allergic rhinitis, Asthma, Diabetes ([address] and Psychiatric problem. SURGICAL HISTORY      has a past surgical history that includes  section; Hysterectomy; and Carpal tunnel release. CURRENT MEDICATIONS       Discharge Medication List as of 2020  2:35 PM      CONTINUE these medications which have NOT CHANGED    Details   melatonin 5 MG TABS tablet Take 5 mg by mouth dailyHistorical Med      ondansetron (ZOFRAN ODT) 4 MG disintegrating tablet Take 1 tablet by mouth every 8 hours as needed for Nausea or Vomiting, Disp-15 tablet,R-0Normal      atorvastatin (LIPITOR) 40 MG tablet TAKE ONE TABLET BY MOUTH DAILY, Disp-30 tablet, R-3Normal      metFORMIN (GLUCOPHAGE) 1000 MG tablet TAKE ONE TABLET BY MOUTH TWICE A DAY WITH MEALS, Disp-60 tablet, R-3Normal      ibuprofen (ADVIL;MOTRIN) 600 MG tablet Take 1 tablet by mouth every 6 hours as needed for Pain, Disp-30 tablet, R-0Print      hydrOXYzine (ATARAX) 10 MG tablet Take 10 mg by mouth 3 times daily as needed for ItchingHistorical Med      Blood Glucose Monitoring Suppl (ACCU-CHEK ROD PLUS) w/Device KIT Disp-1 kit, R-0, NormalCheck blood sugar once daily      glucose blood VI test strips (ACCU-CHEK ROD PLUS) strip Disp-100 each, R-3, NormalCheck blood sugar once daily      Lancets MISC Disp-100 each, R-1, NormalUse to test blood glucose level 1 time per day.  Diagnosis: E11.9      Cetirizine HCl (ZYRTEC ALLERGY) 10 MG CAPS Take 1 tablet by mouth dailyHistorical Med      FLUoxetine (PROZAC) 20 MG capsule Take 20 mg by mouth daily      montelukast (SINGULAIR) 10 MG tablet Take 10 mg by mouth nightly      rOPINIRole (REQUIP) 0.25 MG tablet Take 1 mg by mouth nightly      albuterol sulfate  (90 BASE) MCG/ACT inhaler Inhale 2 puffs into the lungs every 6 hours as needed for Wheezing      calcium-vitamin D (OSCAL-500) 500-200 MG-UNIT per tablet Take 1 tablet by mouth daily ALLERGIES     is allergic to latex; keflex [cephalexin]; betadine [povidone iodine]; neosporin [neomycin-polymyxin-gramicidin]; pcn [penicillins]; and rocephin [ceftriaxone]. FAMILY HISTORY     She indicated that her mother is alive. She indicated that her father is . She indicated that her sister is alive. She indicated that her maternal uncle is . family history includes Cancer in her father and maternal uncle; Depression in her mother; Diabetes in her sister; Thyroid Disease in her mother. SOCIAL HISTORY      reports that she has never smoked. She has never used smokeless tobacco. She reports current alcohol use. She reports that she does not use drugs. PHYSICAL EXAM     INITIAL VITALS:  oral temperature is 97.9 °F (36.6 °C). Her blood pressure is 122/89 and her pulse is 86. Her respiration is 19 and oxygen saturation is 97%. Physical Exam  Constitutional:       General: She is not in acute distress. Appearance: She is not ill-appearing. HENT:      Head: Normocephalic. Mouth/Throat:      Mouth: Mucous membranes are moist.   Eyes:      Pupils: Pupils are equal, round, and reactive to light. Cardiovascular:      Rate and Rhythm: Normal rate and regular rhythm. Pulses: Normal pulses. Heart sounds: Normal heart sounds. No murmur. Pulmonary:      Effort: Pulmonary effort is normal. No respiratory distress. Breath sounds: Normal breath sounds. No wheezing or rales. Abdominal:      General: Abdomen is flat. Bowel sounds are normal. There is no distension. Palpations: Abdomen is soft. Tenderness: There is no abdominal tenderness. There is no guarding. Skin:     General: Skin is warm and dry. Capillary Refill: Capillary refill takes less than 2 seconds. Coloration: Skin is not jaundiced or pale. Neurological:      General: No focal deficit present. Mental Status: She is alert.    Psychiatric:         Mood and Affect: Mood normal.         Behavior: Behavior normal.          DIFFERENTIAL DIAGNOSIS:   Dehydration, hyperglycemia, COVID-19, less likely bacterial pneumonia, Covid pneumonia, ACS, low suspicion for pulmonary embolism    DIAGNOSTIC RESULTS     EKG: All EKG's are interpreted by the Emergency Department Physician who either signs or Co-signs this chart in the absence of a cardiologist.    Normal sinus rhythm ventricular rate 76 bpm.  TX interval 166, QRS duration 70, corrected  ms. This is a poor data quality due to underlying artifact. Low voltage QRS. No STEMI    RADIOLOGY: non-plainfilm images(s) such as CT, Ultrasound and MRI are read by the radiologist.    XR CHEST PORTABLE   Final Result   1. There is stable patchy infiltrate within the left lower chest. There is no pleural effusion or pulmonary vascular congestion. Follow-up chest radiographs are recommended to confirm complete resolution. **This report has been created using voice recognition software. It may contain minor errors which are inherent in voice recognition technology. **      Final report electronically signed by Dr. Yessica Coello on 12/5/2020 1:33 PM          LABS:     Labs Reviewed   COMPREHENSIVE METABOLIC PANEL W/ REFLEX TO MG FOR LOW K - Abnormal; Notable for the following components:       Result Value    Glucose 184 (*)     CO2 22 (*)     All other components within normal limits   POCT GLUCOSE - Abnormal; Notable for the following components:    POC Glucose 178 (*)     All other components within normal limits   CBC WITH AUTO DIFFERENTIAL   TROPONIN   PROCALCITONIN   ANION GAP   OSMOLALITY   GLOMERULAR FILTRATION RATE, ESTIMATED       EMERGENCY DEPARTMENT COURSE:   Vitals:    Vitals:    12/05/20 1253 12/05/20 1334   BP: 122/89    Pulse: 86    Resp: 22 19   Temp: 97.9 °F (36.6 °C)    TempSrc: Oral    SpO2: 98% 97%       12:49 PM EST: The patient was seen and evaluated. Appropriate labs and imaging are ordered.   The patient is given a bolus of IV fluids during work-up. Patient's white blood cell count 7.2. Procalcitonin 0.05. She is afebrile. Chest x-ray shows a stable patchy infiltrate within the left lower chest.  No pleural effusion or pulmonary vascular congestion. This is likely an infiltrate from known Covid infection, less likely bacterial pneumonia. Electrolytes within normal limits. Troponin less than 0.01. Liver enzymes within normal limits. Mild hyperglycemia blood glucose 184. MDM:  Patient symptoms improved with bolus of IV fluids. She remained at rest during this time period. O2 saturations remained 97% on room air. I advised that the patient should be discharged home. Continue to rest.  Continue drinking plenty of fluids. I will place her on zinc, vitamin C, vitamin D to help with her symptoms with known Covid infection. I discussed placing the patient on dexamethasone, however the patient is not hypoxic, she is not wheezing. The patient is a type II diabetic and I advised that this would likely cause hyperglycemia. After discussion, decided not to place on this medication. Patient is advised to follow-up with her primary care provider this week for reevaluation. Return to the emergency department for chest pain, shortness of breath, uncontrolled temperature 100.5 or greater, new concerns. Patient verbalized understanding of all instructions.     CRITICAL CARE:   None    CONSULTS:  None    PROCEDURES:  None    FINAL IMPRESSION      1. COVID-19 virus infection          DISPOSITION/PLAN   Discharge    PATIENT REFERRED TO:  [facility], APRN - [facility]  [place] [address]  [phone]            DISCHARGE MEDICATIONS:  Discharge Medication List as of 12/5/2020  2:35 PM      START taking these medications    Details   Ascorbic Acid (VITAMIN C) 1000 MG tablet Take 1 tablet by mouth 2 times daily, Disp-30 tablet,R-3Print      Cholecalciferol (VITAMIN D3) 50 MCG (2000 UT) CAPS Take 1 capsule by mouth 2 times daily, Disp-30 capsule,R-0Print      zinc 50 MG CAPS Take 50 mg by mouth daily, Disp-30 capsule,R-0Print             (Please note that portions of this note were completed with a voice recognition program.  Efforts were made to edit the dictations but occasionally words are mis-transcribed.)    The patient was given an opportunity to see the Emergency Attending. The patient voiced understanding that I was a Mid-LevelProvider and was in agreement with being seen independently by myself.           Yaya Roland, KATHERIN - CNP  12/05/20 5706

## 2020-12-06 PROCEDURE — 93010 ELECTROCARDIOGRAM REPORT: CPT | Performed by: INTERNAL MEDICINE

## 2020-12-07 ENCOUNTER — CARE COORDINATION (OUTPATIENT)
Dept: CARE COORDINATION | Age: 45
End: 2020-12-07

## 2020-12-07 NOTE — CARE COORDINATION
Attempted to reach patient for ED follow up in regards to COVID-19 education/ monitoring. Patient was unavailable at the time of my call, and a generic voicemail message was left asking patient to return my call at [phone].

## 2020-12-08 NOTE — CARE COORDINATION
Attempted to reach patient for ED follow up in regards to COVID-19 education/ monitoring. Patient was unavailable at the time of my call, and a generic voicemail message was left asking patient to return my call at [phone].

## 2021-08-20 ENCOUNTER — OFFICE VISIT (OUTPATIENT)
Dept: BARIATRICS/WEIGHT MGMT | Age: 46
End: 2021-08-20
Payer: COMMERCIAL

## 2021-08-20 VITALS
DIASTOLIC BLOOD PRESSURE: 80 MMHG | RESPIRATION RATE: 18 BRPM | SYSTOLIC BLOOD PRESSURE: 132 MMHG | TEMPERATURE: 97.9 F | HEIGHT: 66 IN | WEIGHT: 281.8 LBS | BODY MASS INDEX: 45.29 KG/M2 | HEART RATE: 88 BPM

## 2021-08-20 DIAGNOSIS — E04.2 MULTINODULAR GOITER: ICD-10-CM

## 2021-08-20 DIAGNOSIS — J45.909 UNCOMPLICATED ASTHMA, UNSPECIFIED ASTHMA SEVERITY, UNSPECIFIED WHETHER PERSISTENT: ICD-10-CM

## 2021-08-20 DIAGNOSIS — G47.33 OBSTRUCTIVE SLEEP APNEA: ICD-10-CM

## 2021-08-20 DIAGNOSIS — F32.A DEPRESSION, UNSPECIFIED DEPRESSION TYPE: ICD-10-CM

## 2021-08-20 DIAGNOSIS — E11.9 TYPE 2 DIABETES MELLITUS WITHOUT COMPLICATION, WITHOUT LONG-TERM CURRENT USE OF INSULIN (HCC): ICD-10-CM

## 2021-08-20 DIAGNOSIS — G25.81 RLS (RESTLESS LEGS SYNDROME): ICD-10-CM

## 2021-08-20 DIAGNOSIS — F41.9 ANXIETY: ICD-10-CM

## 2021-08-20 DIAGNOSIS — E66.01 MORBID OBESITY (HCC): Primary | ICD-10-CM

## 2021-08-20 PROCEDURE — G8427 DOCREV CUR MEDS BY ELIG CLIN: HCPCS | Performed by: SURGERY

## 2021-08-20 PROCEDURE — 1036F TOBACCO NON-USER: CPT | Performed by: SURGERY

## 2021-08-20 PROCEDURE — G8417 CALC BMI ABV UP PARAM F/U: HCPCS | Performed by: SURGERY

## 2021-08-20 PROCEDURE — 3046F HEMOGLOBIN A1C LEVEL >9.0%: CPT | Performed by: SURGERY

## 2021-08-20 PROCEDURE — 99203 OFFICE O/P NEW LOW 30 MIN: CPT | Performed by: SURGERY

## 2021-08-20 PROCEDURE — 2022F DILAT RTA XM EVC RTNOPTHY: CPT | Performed by: SURGERY

## 2021-08-20 RX ORDER — OXYBUTYNIN CHLORIDE 5 MG/1
1 TABLET, EXTENDED RELEASE ORAL DAILY
COMMUNITY
Start: 2021-07-28

## 2021-08-20 RX ORDER — TRAZODONE HYDROCHLORIDE 150 MG/1
150 TABLET ORAL NIGHTLY
COMMUNITY
End: 2022-10-17

## 2021-08-20 RX ORDER — OMEPRAZOLE 40 MG/1
1 CAPSULE, DELAYED RELEASE ORAL DAILY
COMMUNITY
Start: 2021-07-28

## 2021-08-20 RX ORDER — DULAGLUTIDE 0.75 MG/.5ML
0.75 INJECTION, SOLUTION SUBCUTANEOUS WEEKLY
Status: ON HOLD | COMMUNITY
End: 2022-07-12 | Stop reason: HOSPADM

## 2021-08-28 ASSESSMENT — ENCOUNTER SYMPTOMS
EYE REDNESS: 0
ABDOMINAL PAIN: 0
NAUSEA: 0
RHINORRHEA: 0
SINUS PRESSURE: 0
VOMITING: 0
EYE ITCHING: 0
APNEA: 0
CHOKING: 0
ALLERGIC/IMMUNOLOGIC NEGATIVE: 1
EYE DISCHARGE: 0
PHOTOPHOBIA: 0
BLOOD IN STOOL: 0
CHEST TIGHTNESS: 0
VOICE CHANGE: 0
COUGH: 0
STRIDOR: 0
CONSTIPATION: 0
SORE THROAT: 0
FACIAL SWELLING: 0
DIARRHEA: 0
TROUBLE SWALLOWING: 0
WHEEZING: 0
EYE PAIN: 0
ABDOMINAL DISTENTION: 0
BACK PAIN: 0
RECTAL PAIN: 0
ANAL BLEEDING: 0
COLOR CHANGE: 0
SHORTNESS OF BREATH: 0

## 2021-08-29 NOTE — PROGRESS NOTES
Nanette Iraheta (:  1975)     ASSESSMENT:  1.  Morbid obesity (BMI 45)  2. Sleep apnea  3. Anxiety  4. Depression  5. Diabetes mellitus  6. Asthma  7. Multinodular goiter  8. Restless leg syndrome    PLAN:  1. Long discussion about the pros and cons of weight loss surgery. The risks benefits and alternatives to laparoscopic adjustable band, gastric sleeve and gastric Jadiel-en-Y bypass were discussed in detail. The pros and cons of robotic assisted, laparoscopic and open techniques were discussed. 2.  Behavior modification discussed in detail in regards to dietary habits. 3.  Nutritional education occurred during visit. Will set up a consultation/evaluation with dietitian for further evaluation. 4.  Options for medical management of morbid obesity discussed. 5.  Improvement in fitness/exercise discussed with patient and the need for this with/without surgery. 6.  Obtain medical necessity letter from PCP as needed. 7.  Follow-up in one month at weight management program at [address] [place] [address]. 8.  Signs and symptoms reviewed with patient that would be concerning and need her to return to office for re-evaluation. Patient states she will call if she has questions or concerns. 9. Multivitamin  10. Psychology evaluation  11. EGD prior to any surgical intervention  12. Encouraged support groups  13. Encouraged Naturally Slim/Rev It Up  14. Discussed the pros and cons along with possible side effects/complications of weight loss medications. All questions answered. Patient states that if she is not able to lose enough adequate excess body weight with medical management only then she would be like to proceed with surgical intervention such as sleeve gastrectomy/gastric bypass for further weight loss. More than 40 minutes spent with patient today. Greater than 50% of the time was involved counseling, educaton and coordinating care.       SUBJECTIVE/OBJECTIVE:    Chief Complaint   Patient presents with    Bariatric, Initial Visit     New Patient 6 month requirement      HPI  Felice Hyman is a 56-year-old female who presents for initial evaluation at the weight management program secondary to her morbid obesity. BMI 45. Current weight 281 pounds. Multiple significant comorbid conditions including sleep apnea and diabetes mellitus. Bang score 3-4. Denies tobacco abuse. Has been overweight for several years. Worse after her children. She has 2 children ages 6 and 15. She states she has tried multiple times at weight loss but has never been successful long-term. She had been able to lose more than 100 pounds with Samul Prima but unfortunately gained it all back. Denies chest or abdominal pain. Shortness of breath with increased activity because of excess body weight. No urinary complaints. No hematochezia or melena. She tries to walk every day to improve with physical activity. Admits the excess body weight affects her physically. Lower extremity joint aching. She states it also affects her socially/mentally. Admits that if she is not able to lose enough adequate excess body weight with medical management only then she would like to proceed with surgical intervention. Review of Systems   Constitutional: Negative for activity change, appetite change, chills, diaphoresis, fatigue, fever and unexpected weight change. HENT: Negative for congestion, dental problem, drooling, ear discharge, ear pain, facial swelling, hearing loss, mouth sores, nosebleeds, postnasal drip, rhinorrhea, sinus pressure, sneezing, sore throat, tinnitus, trouble swallowing and voice change. Eyes: Negative for photophobia, pain, discharge, redness, itching and visual disturbance. Respiratory: Negative for apnea, cough, choking, chest tightness, shortness of breath, wheezing and stridor. Cardiovascular: Negative for chest pain, palpitations and leg swelling.    Gastrointestinal: Negative for abdominal distention, abdominal pain, anal bleeding, blood in stool, constipation, diarrhea, nausea, rectal pain and vomiting. Endocrine: Negative. Genitourinary: Negative for decreased urine volume, difficulty urinating, dyspareunia, dysuria, enuresis, flank pain, frequency, genital sores, hematuria, menstrual problem, pelvic pain, urgency, vaginal bleeding, vaginal discharge and vaginal pain. Musculoskeletal: Negative for arthralgias, back pain, gait problem, joint swelling, myalgias, neck pain and neck stiffness. Skin: Negative for color change, pallor, rash and wound. Allergic/Immunologic: Negative. Neurological: Negative for dizziness, tremors, seizures, syncope, facial asymmetry, speech difficulty, weakness, light-headedness, numbness and headaches. Hematological: Negative for adenopathy. Does not bruise/bleed easily. Psychiatric/Behavioral: Negative for agitation, behavioral problems, confusion, decreased concentration, dysphoric mood, hallucinations, self-injury, sleep disturbance and suicidal ideas. The patient is not nervous/anxious and is not hyperactive.         Past Medical History:   Diagnosis Date    Allergic rhinitis     Anxiety     Asthma     Depression     Diabetes ([facility] [address]     Psychiatric problem     depression    Sleep apnea        Past Surgical History:   Procedure Laterality Date    CARPAL TUNNEL RELEASE       SECTION      HYSTERECTOMY      KNEE ARTHROSCOPY         Current Outpatient Medications   Medication Sig Dispense Refill    traZODone (DESYREL) 150 MG tablet Take 150 mg by mouth nightly      Dulaglutide (TRULICITY) 5.61 KIKE/8.2BI SOPN Inject 0.75 mg into the skin once a week      atorvastatin (LIPITOR) 40 MG tablet TAKE ONE TABLET BY MOUTH DAILY 30 tablet 3    metFORMIN (GLUCOPHAGE) 1000 MG tablet TAKE ONE TABLET BY MOUTH TWICE A DAY WITH MEALS (Patient taking differently: See Admin Instructions 1000 mg in AM, 500mg in PM.) 60 tablet 3    hydrOXYzine (ATARAX) 10 MG tablet Take 10 mg by mouth 3 times daily as needed for Itching      Blood Glucose Monitoring Suppl (ACCU-CHEK ROD PLUS) w/Device KIT Check blood sugar once daily 1 kit 0    glucose blood VI test strips (ACCU-CHEK ROD PLUS) strip Check blood sugar once daily 100 each 3    Lancets MISC Use to test blood glucose level 1 time per day. Diagnosis: E11.9 100 each 1    FLUoxetine (PROZAC) 20 MG capsule Take 40 mg by mouth daily       montelukast (SINGULAIR) 10 MG tablet Take 10 mg by mouth nightly      rOPINIRole (REQUIP) 0.25 MG tablet Take 4 mg by mouth nightly       albuterol sulfate  (90 BASE) MCG/ACT inhaler Inhale 2 puffs into the lungs every 6 hours as needed for Wheezing      omeprazole (PRILOSEC) 40 MG delayed release capsule Take 1 capsule by mouth daily      oxybutynin (DITROPAN-XL) 5 MG extended release tablet Take 1 tablet by mouth 6 times daily      ibuprofen (ADVIL;MOTRIN) 600 MG tablet Take 1 tablet by mouth every 6 hours as needed for Pain 30 tablet 0    Cetirizine HCl (ZYRTEC ALLERGY) 10 MG CAPS Take 1 tablet by mouth daily       No current facility-administered medications for this visit.        Allergies   Allergen Reactions    Latex Rash    Keflex [Cephalexin] Rash    Betadine [Povidone Iodine]     Neosporin [Neomycin-Polymyxin-Gramicidin]     Pcn [Penicillins]     Rocephin [Ceftriaxone] Rash       Family History   Problem Relation Age of Onset    Thyroid Disease Mother     Depression Mother     Asthma Mother     Arthritis Mother     Cancer Father     Arthritis Father     Diabetes Sister     Arthritis Sister     Cancer Maternal Uncle     Arthritis Paternal Grandfather     Cancer Paternal Grandfather     Arthritis Paternal Grandmother     Arthritis Maternal Grandfather     Arthritis Maternal Grandmother     Asthma Maternal Grandmother        Social History     Socioeconomic History    Marital status:      Spouse name: Not on file    Number of children: 3    Years of education: Not on file    Highest education level: Not on file   Occupational History    Not on file   Tobacco Use    Smoking status: Never Smoker    Smokeless tobacco: Never Used   Vaping Use    Vaping Use: Never used   Substance and Sexual Activity    Alcohol use: Yes     Comment: socially    Drug use: No    Sexual activity: Yes   Other Topics Concern    Not on file   Social History Narrative    Not on file     Social Determinants of Health     Financial Resource Strain:     Difficulty of Paying Living Expenses:    Food Insecurity:     Worried About Running Out of Food in the Last Year:     [address] in the Last Year:    Transportation Needs:     Lack of Transportation (Medical):  Lack of Transportation (Non-Medical):    Physical Activity:     Days of Exercise per Week:     Minutes of Exercise per Session:    Stress:     Feeling of Stress :    Social Connections:     Frequency of Communication with Friends and Family:     Frequency of Social Gatherings with Friends and Family:     Attends Synagogue Services:     Active Member of Clubs or Organizations:     Attends Club or Organization Meetings:     Marital Status:    Intimate Partner Violence:     Fear of Current or Ex-Partner:     Emotionally Abused:     Physically Abused:     Sexually Abused:      Vitals:    08/20/21 1039   BP: 132/80   Site: Right Upper Arm   Position: Sitting   Cuff Size: Large Adult   Pulse: 88   Resp: 18   Temp: 97.9 °F (36.6 °C)   TempSrc: Infrared   Weight: 281 lb 12.8 oz (127.8 kg)   Height: 5' 6\" (1.676 m)     Body mass index is 45.48 kg/m². Wt Readings from Last 3 Encounters:   08/20/21 281 lb 12.8 oz (127.8 kg)   11/29/20 260 lb (117.9 kg)   11/02/20 260 lb (117.9 kg)     Physical Exam  Vitals reviewed. Constitutional:       General: She is not in acute distress. Appearance: She is well-developed. She is not diaphoretic.    HENT:      Head: Normocephalic and atraumatic. Right Ear: External ear normal.      Left Ear: External ear normal.      Nose: Nose normal.   Eyes:      General: No scleral icterus. Right eye: No discharge. Left eye: No discharge. Conjunctiva/sclera: Conjunctivae normal.   Cardiovascular:      Rate and Rhythm: Normal rate and regular rhythm. Heart sounds: Normal heart sounds. Pulmonary:      Effort: Pulmonary effort is normal. No respiratory distress. Breath sounds: Normal breath sounds. No wheezing or rales. Chest:      Chest wall: No tenderness. Abdominal:      General: Bowel sounds are normal. There is no distension. Palpations: Abdomen is soft. There is no mass. Tenderness: There is no abdominal tenderness. There is no guarding or rebound. Musculoskeletal:         General: No tenderness. Normal range of motion. Cervical back: Normal range of motion and neck supple. Skin:     General: Skin is warm and dry. Coloration: Skin is not pale. Findings: No erythema or rash. Neurological:      Mental Status: She is alert and oriented to person, place, and time. Cranial Nerves: No cranial nerve deficit. Psychiatric:         Behavior: Behavior normal.         Thought Content:  Thought content normal.         Judgment: Judgment normal.       Lab Results   Component Value Date    WBC 7.2 12/05/2020    HGB 12.6 12/05/2020    HCT 38.4 12/05/2020    MCV 88.1 12/05/2020     12/05/2020     Lab Results   Component Value Date     12/05/2020    K 4.3 12/05/2020     12/05/2020    CO2 22 (L) 12/05/2020     Lab Results   Component Value Date    CREATININE 0.6 12/05/2020     Lab Results   Component Value Date    ALT 19 12/05/2020    AST 23 12/05/2020    ALKPHOS 89 12/05/2020    BILITOT 0.4 12/05/2020     Lab Results   Component Value Date    LIPASE 17.8 11/29/2020       Patient Active Problem List   Diagnosis    Dyslipidemia    Cellulitis of lower extremity  bilateral    Allergic drug reaction    Myalgia  lower wxtremities    Staphylococcal skin disorder    Type 2 diabetes mellitus without complication, without long-term current use of insulin (HCC)    RLS (restless legs syndrome)    Multinodular goiter       An electronic signature was used to authenticate this note.     --Anali Navarrete MD

## 2021-09-01 NOTE — PROGRESS NOTES
Patient is a 51 y.o. female seen for   initial  MNT visit for  pre op bariatric surgery desires sleeve    BMI: Body mass index is 44.71 kg/m². Obesity Classification: Class III    Weight History: Wt Readings from Last 3 Encounters:   09/03/21 277 lb (125.6 kg)   08/20/21 281 lb 12.8 oz (127.8 kg)   11/29/20 260 lb (117.9 kg)     Hx of DM- checking blood sugars at home and is around 120 in am.  Taking Trulucity once a week \"when I remember to take it\"  DARIEN appt is 10/20/21- needs new supplies for CPAP machine- has not been wearing it  States Has appointment with a Neuropsychiatrist in February out of [facility] I keep forgetting things\"- been happening for ~ one year- per pt since had COVID Nov 2020    Patient has not had a mammogram with the past year- been three years- pt open to having this done and has family hx of breast CA. Patient has not had a DEXA scan within the past 2 years. Patient is taking a Multivitamin daily:  Does take a MVI- [employer] brand one a day pill form    Describe your current weight: \"I am not happy at weight I am at\". How does your weight affect your daily activities? concern over medical problems, lack of energy . Jacinda Mattson Patient's highest adult weight was 301 lbs at age 42. Patient was at her highest weight for 1 years. Patient's triggers/known causes to her highest weight are eating habits, sedentary. Patient has participated in the following weight loss programs: [employer] (296 to 212 lbs in less than one year ~ 2018), Patient hasparticipated in meal replacement/liquid diets. Patient has participated in weight loss medications.- OTC only    Patient is not lactose intolerant. Patient does not have Islam/cultural food concerns. Patient does have food allergies. - melons- throat itching, shellfish-throat itching    Patient dines out to a sit down restaurant 1 time per month. Patient has fast food or picks up carry out 3 times per week.      Grocery Shopping in household done by: pt and self  Cooking in household done by: pt and spouse- mainly spouse    Works 7:33a-1pm or 2p-9p at [employer] five-six days a week. 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  Snack: no.   Lunch: yes. 11:30a-12pm- orders food to work- [employer]- salad with chicken and veggies  or [employer]. Yesterday had [employer] sandwich  Snack: yes. 2:30p-3:00p- bowl of cereal cocoa shayne with whole milk  Dinner: yes. 5:30p-6:00p- some fried chicken, baked chicken- uses instapot, andreas cheeseteak with green peppers, spaghetti squash or if works late shift at work has Carmita: yes. \"only if I have something to snack on\" - chips, salsa and Velveeta cheese or chex mix- states doesn't snack much  Drinks throughout the day: 4-5 bottles water a day and more at work, ~ 2-3 cups coffee /day with artificial sweetener (splenda) and powder creamer, 1-2 cans pop zero sugar per week, no tea    Do you drink alcohol? Very rare    Patient does not meet the criteria for binge eating disorder. Patient does not have grazing. Patient does not have night eating. Patient does sometimes have a history of emotional eating or eating out of boredom. It does not take an unusually large amount of food for the patient to feel comfortably full. Patient describes self as sometimes fast eater      Patient describes level of activity as sedentary. Had a cast on recently for a torn achilles tendon. Also has bone spurs  Supposed to be in a walking boot but hasn't been wearing it. Has f/u appt to [facility] Sept 29th  Patient will plan to attend Fitness Orientation on: Sept 30th.   Provided pt with chair exercise booklet and is agreeable to start chair exercisees    Assessment  Nutritional Needs: Srini Blair = 1932 kcal x 1.2 (activity factor)= 2318 kcal - 500-1000 calories/day  (for 1-2 lb weight loss/week)= 2773-4733 calories per day  Food recall reveals increased eating when at work, good water intake. PES Statement:  Overweight/Obesity related to lack of exercise, sedentary lifestyle, unhealthy eating habits, and unsuccessful diet attempts as evidenced by  Body mass index is 44.71 kg/m². .    Surgery  Surgical procedure desired: gastric sleeve  Patient's greatest concern about having surgery is: concerned with down time for work. Patient describes the motivation for weight loss surgery to be: \"I want to be more active with my kids\". The expectations following the surgery were reviewed in detail with patient today and patient made aware will require to eliminate carbonated beverages, aim for regular meal times, monitor portion sizes, and balanced meals. .   she does feel she can deal with the dietary restrictions. Patient states she does understand the consequences of not complying with post-op food guidelines. Patient states she understands the long term changes in food intake that will be necessary for all occasions after surgery for the rest of her life. Patient is deemed nutritionally appropriate to proceed if able to show progress towards nutrition behavior changes discussed today. Plan  Patient will begin working on recommendations from Pre-Weight Loss Surgery Behavior Change Goal Sheet that was reviewed today to assist with weight loss and establish a  long term healthy eating pattern. Individual goals set with patient to be reviewed at monthly office visits. Weight loss goal of 3-5% from initial weight at first visit with Dr Lockett Dusty reviewed with patient to achieve over 6 month period per insurance requirements. Initial weight was: 281lbs   3-5% Weight Loss goal will be minimum of: 8-14 lbs weight loss. Plan/Recommendations:    Educational handouts provided and reviewed with patient as follows: Online Support Groups, My Plate Picture Method, Portion Size Guide, Food Journal    -  Patient Instructions   Goals:  1. I will attend Fitness Orientation on Thursday September 30th at 4pm in Weight Management Office  2. I will get the lab work done that is mailed to my mailing address before next office visit. You will need to fast 10-12 hours for this (no food or drink besides water). 3.  I will aim for at least 64 oz of water each day (= 8 cups per day or four bottled apodaca)  4. I will use my food journal to record meal times, serving sizes and bring back to next dietitian visit. 5.  I will increase my physical activity by starting chair exercises at home (see booklet given to you) for 15-20 minutes every day  6. Initial weight loss goal of 3-5% is recommended over 6 month period. This is a minimum of: 8-14 lbs from your weight when initially met with physician of: 281 lbs. -Followup visit: 4 weeks with dietitian.        Payam Mcadams RD, BALA RD, LD  Dietitian- Weight Management [address] [place] [address]

## 2021-09-03 ENCOUNTER — OFFICE VISIT (OUTPATIENT)
Dept: BARIATRICS/WEIGHT MGMT | Age: 46
End: 2021-09-03

## 2021-09-03 VITALS — HEIGHT: 66 IN | BODY MASS INDEX: 44.52 KG/M2 | WEIGHT: 277 LBS

## 2021-09-03 DIAGNOSIS — Z01.818 PRE-OP TESTING: ICD-10-CM

## 2021-09-03 DIAGNOSIS — E04.2 MULTINODULAR GOITER: ICD-10-CM

## 2021-09-03 DIAGNOSIS — E11.9 TYPE 2 DIABETES MELLITUS WITHOUT COMPLICATION, WITHOUT LONG-TERM CURRENT USE OF INSULIN (HCC): ICD-10-CM

## 2021-09-03 DIAGNOSIS — Z12.31 ENCOUNTER FOR SCREENING MAMMOGRAM FOR MALIGNANT NEOPLASM OF BREAST: Primary | ICD-10-CM

## 2021-09-03 DIAGNOSIS — E66.01 MORBID OBESITY WITH BMI OF 40.0-44.9, ADULT (HCC): ICD-10-CM

## 2021-09-03 DIAGNOSIS — E66.01 MORBID OBESITY WITH BMI OF 40.0-44.9, ADULT (HCC): Primary | ICD-10-CM

## 2021-09-03 DIAGNOSIS — Z13.21 MALNUTRITION SCREEN: ICD-10-CM

## 2021-09-03 DIAGNOSIS — E78.5 DYSLIPIDEMIA: ICD-10-CM

## 2021-09-03 NOTE — PATIENT INSTRUCTIONS
Goals: 1. I will attend Fitness Orientation on Thursday September 30th at 4pm in Weight Management Office  2. I will get the lab work done that is mailed to my mailing address before next office visit. You will need to fast 10-12 hours for this (no food or drink besides water). 3.  I will aim for at least 64 oz of water each day (= 8 cups per day or four bottled apodaca)  4. I will use my food journal to record meal times, serving sizes and bring back to next dietitian visit. 5.  I will increase my physical activity by starting chair exercises at home (see booklet given to you) for 15-20 minutes every day  6. Initial weight loss goal of 3-5% is recommended over 6 month period. This is a minimum of: 8-14 lbs from your weight when initially met with physician of: 281 lbs.

## 2021-09-17 ENCOUNTER — OFFICE VISIT (OUTPATIENT)
Dept: PSYCHIATRY | Age: 46
End: 2021-09-17
Payer: COMMERCIAL

## 2021-09-17 DIAGNOSIS — F33.41 RECURRENT MAJOR DEPRESSIVE DISORDER, IN PARTIAL REMISSION (HCC): Primary | ICD-10-CM

## 2021-09-17 DIAGNOSIS — Z87.828 HISTORY OF TRAUMA: ICD-10-CM

## 2021-09-17 DIAGNOSIS — F10.21 ALCOHOL USE DISORDER, MODERATE, IN EARLY REMISSION (HCC): ICD-10-CM

## 2021-09-17 DIAGNOSIS — E66.01 MORBID OBESITY (HCC): ICD-10-CM

## 2021-09-17 PROCEDURE — 96131 PSYCL TST EVAL PHYS/QHP EA: CPT | Performed by: PSYCHOLOGIST

## 2021-09-17 PROCEDURE — 90791 PSYCH DIAGNOSTIC EVALUATION: CPT | Performed by: PSYCHOLOGIST

## 2021-09-17 PROCEDURE — 96130 PSYCL TST EVAL PHYS/QHP 1ST: CPT | Performed by: PSYCHOLOGIST

## 2021-09-17 PROCEDURE — 1036F TOBACCO NON-USER: CPT | Performed by: PSYCHOLOGIST
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
## 2021-09-17 NOTE — PATIENT INSTRUCTIONS
Bariatric Surgery Psychosocial Requirements: Letter to Patients    The following requirements were made to help ensure your long-term success with bariatric surgery:      Although not required, consider initiating treatment with a therapist to work on the following goals we discussed: Individual counseling for additional support identifying and implementing adaptive coping skills to eliminate problematic eating behaviors, manage mood and anxiety symptoms, increase and sustain behavior change, and facilitate healthy lifestyle changes.  Continue treatment with a PCP/psychiatrist to work on the following goals we discussed: Medication management/evaluation.  Completely eliminate:  o Alcohol  o Caffeine  o Skipping meals      Continue to abstain from:  o Carbonation   o Nicotine  o Illicit drugs     Increase exercise as medically tolerated, and continue weight loss as required by the surgeon. o Consider meeting with Aimee Mccrary exercise physiologist, for a 1 on 1.     Complete an advanced directive for medical decision making     Ask a support person to review the bariatric surgery information binder and to attend all bariatric surgery appointments with you.  Other:  o Begin to monitor nutrition and exercise (e.g. Apps: [employer]nergiPal, Baritastic, LoseIt!).  o Consider tracking your emotions in your food diary to begin to identify triggers. o Follow up with our bariatric dietitian regarding specific nutritional questions. o Identify and implement adaptive coping strategies. o Obtain a scale / Begin weighing self regularly (once per week). o Try to obtain 7-8 hours of sleep nightly. o Continue taking psychiatric medications as prescribed by your provider and follow-up for medication management.  o Increase awareness of any mood or anxiety symptoms that may arise during the weight loss process as these could potentially interfere with your weight loss success.     To assist with your success throughout this process, you are invited to join:   FREE support groups that are currently being held online

## 2021-09-23 NOTE — PROGRESS NOTES
Assessment: Patient is a 51 y.o. female seen for   month one  follow up MNT visit for  pre op bariatric surgery desires sleeve    Vitals from current and previous visits:  Vitals 42/1/2836   SYSTOLIC 569   DIASTOLIC 70   Site Right Upper Arm   Position Sitting   Cuff Size Large Adult   Pulse 80   Temp 96.3   Resp    SpO2    Weight 268 lb   Height 5' 6\"   Body mass index 43.25 kg/m2   Pain Level    Waist (Inches)    Neck circumference        Initial weight at start of Weight Management Program was: 281 lbs     Holly Brady lost 13 lbs from initial MD visit  -Weight goal: lose weight.     -Nutritionally relevant labs: Initial labs- just completed this am- HA1C today was 6.9%, Vitamin D-40, Iron levels WNL. Some results pending  Lab Results   Component Value Date/Time    LABA1C 6.5 (H) 11/16/2017 11:04 AM    LABA1C 8.6 08/10/2017 04:24 PM    LABA1C 6.4 12/15/2016 12:10 PM    GLUCOSE 184 (H) 12/05/2020 01:20 PM    GLUCOSE 224 (H) 11/29/2020 11:05 AM    CHOL 125 12/15/2016 12:10 PM    HDL 62 12/15/2016 12:10 PM    TRIG 115 12/15/2016 12:10 PM     Initial Dr Daisy Bailey visit was 9/17/21- follow up scheduled January 5th   DARIEN consult is 10/20/21    - Is patient taking daily Multivitamin:  Does take a MVI- now taking Women's gummies MVI two per day as ran out of [employer]    Works 7:30a-2pm or 2p-9p at [employer] five-six days a week. 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 Verbal recall taken  Breakfast: skipped breakfast because was fasting.- but hasn't been eating breakfast  Lunch: had some broccoli salad, grapes and cheese today   Dinner: ~5 pm pt ate at work last nite- had pulled pork nachos from [employer].    Snacks: was eating bowl of cereal when got home from work but has cut this out or will order sub from [facility] six inch  Main Beverages:  up to 5-6  bottles water a day and more at work, ~ down to 1 cup coffee /day with artificial sweetener (splenda) and powder creamer, cut all diet pop out this month, no tea     -Impression of Dietary Intake: skips meals often, improved water intake. - Pt  is working towards avoiding high fat/high sugar foods. Pt  is working towards  including protein at meals and snacks. Exercise:    -Physical Activity is:  Was at [facility] and states next step with them would be foot surgery. Started some chair exercises when able  Provided patient with exercise log to record daily activity and return to office each month. Recommendation given to record activity at least 24 days of the month. This will be submitted to insurance prior to approval for bariatric surgery. Nutrition Diagnosis: Overweight/Obesity related to currently undergoing MNT as evidenced by BMI of 43.2  Intervention:   Healthy behaviors: eliminated carbonated beverages and adequate fluid intake  Patient has not attended support groups yet. Reviewed educational handouts and monthly goals with pt. Handouts given: Top Notch Protein Foods and Reducing Fat and Calories in Meal Planning  . Patient Instructions   Goals:  1. Nutrition Goal:  I will work on eating something small within an hour of waking up in an effort to not skip meals  2. Water Goal:  Great job increasing water intake continue at least ~ 5-6 bottles water a day  3. Exercise Goal:  Bring back exercise logs back to next visit. Do chair exercises at home at least 15-20 minutes  4. Watch online [employer] as support groups. -Followup visit: 4 weeks with dietitian.          Carina Ryan RD, LD, RD, LD  Dietitian- Weight Management [address] [place] [address]

## 2021-09-26 NOTE — PROGRESS NOTES
[facility] Sleep and [facility] initial consultation note    Randall Monterroso                                                Chief complaint: Randall Monterroso is a 51 y. o.oldfemale came for further evaluation regarding her sleep apnea  with referral from Dr. Cherie Laughlin MD    Patient underwent sleep study 6 years back at [facility] sleep lab. She used to follow with Dr. Willa Chen at [facility] in the past. She switched her care to me for continuation of her sleep apnea management. Cachil DeHe:    Sleep/Wake schedule:  Usual time to go to bed during the work/regular day of week: 10:30 PM  Usual time to wake up during the work//regular day of week : 6 AM  Over the weekends her sleep schedule:   [x]phase delayed. She feels the same on the weekends despite sleeping long time. She usually falls a sleep in less than: 30 minutes  She takes naps: No.     Sleep Hygiene:    Is the temperature and evironment in her bed room is acceptable to her: Yes. She watches Television in her bed room: No.  She read books, study, pay bills etc in the bed: No.  Frequency She wake up during night/sleep: 2-3 times  Majority of nocturnal awakenings are for urination: Yes. Difficulty in falling back to sleep after nocturnal awakenings: Yes- Sometimes. Do you drink coffee: Yes. She drinks 1 to 2 cups of coffee per day  Do you drink caffeinated beverages i.e sodas: Yes.  1 to 2 cans/week. She drinks Dr. Eileen Cordoba Zero  Do you drink tea: Yes.   She drinks 0 sugar tea  Do you drink alcoholic beverages: No.      History of recreational drug use: No.     Social History     Tobacco Use    Smoking status: Never Smoker    Smokeless tobacco: Never Used   Vaping Use    Vaping Use: Never used   Substance Use Topics    Alcohol use: Yes     Comment: socially    Drug use: No       Sleep apnea symptoms:    She is not using her CPAP machine for the last 1 year due to lack of CPAP supplies including tubing and mask. She moved to a new house and last contact with her [address]. History of headaches in the morning:No.  History of dry mouth in the morning: Yes. History of palpitations during night time/nocturnal awakenings: Yes. Some times  History of sweating during night time/nocturnal awakenings: Yes    General:  History of head injury in the past: No.    History of seizures: NO.  Rest less legs syndrome symptoms:NO  History suggestive of periodic limb movements during sleep: NO  History suggestive of hypnagogic hallucinations: NO  History suggestive of hypnopompic hallucinations: NO  History suggestive of sleep talking: NO  History suggestive of sleep walking:NO  History suggestive of bruxism: NO     History suggestive of cataplexy: NO  History suggestive of sleep paralysis:NO    Family history of sleep disorders:  Family history of obstructive sleep apnea: NO.  Family history of Narcolepsy: NO.  Family history of Rest less legs syndrome : NO.      History regarding old sleep studies:  Prior history of sleep study: Yes. Please see the diagnostic data section for details. Using CPAP device: No.  She needs CPAP supplies. Currently using home Oxygen: NO.      Patient considerations:  Is the patient is ambulatory: Yes  Patient is currently using: None of these Wheelchair, [facility] or [employer]. Para/Quadriplegic: NO  Hearing deficit : NO  Claustrophobic: NO  MDD : NO  Blind: NO  Incontinent: NO  Para/Quadraplegi: NO.   Need transportation to and from Sleep Center:NO    Social History:  Social History     Tobacco Use    Smoking status: Never Smoker    Smokeless tobacco: Never Used   Vaping Use    Vaping Use: Never used   Substance Use Topics    Alcohol use: Yes     Comment: socially    Drug use: No   .  She is currently working: Yes. She is currently working at [employer] in [facility].      She usually goes to her work at: 7 AM-variable  She completes her work at: 2 PM-variable                       Past Medical History:   Diagnosis Date    Allergic rhinitis     Anxiety     Asthma     Depression     Diabetes ([address]     Psychiatric problem     depression    Sleep apnea        Past Surgical History:   Procedure Laterality Date    CARPAL TUNNEL RELEASE       SECTION      HYSTERECTOMY      KNEE ARTHROSCOPY         Allergies   Allergen Reactions    Latex Rash    Keflex [Cephalexin] Rash    Betadine [Povidone Iodine]     Neosporin [Neomycin-Polymyxin-Gramicidin]     Pcn [Penicillins]     Rocephin [Ceftriaxone] Rash       Current Outpatient Medications   Medication Sig Dispense Refill    traZODone (DESYREL) 150 MG tablet Take 150 mg by mouth nightly      Dulaglutide (TRULICITY) 8.09 NV/7.8KY SOPN Inject 0.75 mg into the skin once a week      omeprazole (PRILOSEC) 40 MG delayed release capsule Take 1 capsule by mouth daily      oxybutynin (DITROPAN-XL) 5 MG extended release tablet Take 1 tablet by mouth 6 times daily      atorvastatin (LIPITOR) 40 MG tablet TAKE ONE TABLET BY MOUTH DAILY 30 tablet 3    metFORMIN (GLUCOPHAGE) 1000 MG tablet TAKE ONE TABLET BY MOUTH TWICE A DAY WITH MEALS (Patient taking differently: See Admin Instructions 500 mg in AM, 1000mg in PM.) 60 tablet 3    ibuprofen (ADVIL;MOTRIN) 600 MG tablet Take 1 tablet by mouth every 6 hours as needed for Pain 30 tablet 0    hydrOXYzine (ATARAX) 10 MG tablet Take 10 mg by mouth 3 times daily as needed for Itching      Blood Glucose Monitoring Suppl (ACCU-CHEK ROD PLUS) w/Device KIT Check blood sugar once daily 1 kit 0    glucose blood VI test strips (ACCU-CHEK ROD PLUS) strip Check blood sugar once daily 100 each 3    Lancets MISC Use to test blood glucose level 1 time per day.  Diagnosis: E11.9 100 each 1    Cetirizine HCl (ZYRTEC ALLERGY) 10 MG CAPS Take 1 tablet by mouth daily      FLUoxetine (PROZAC) 20 MG capsule Take 40 mg by mouth daily       montelukast (SINGULAIR) 10 MG tablet Take 10 mg by mouth nightly      rOPINIRole (REQUIP) 0.25 MG tablet Take 4 mg by mouth nightly       albuterol sulfate  (90 BASE) MCG/ACT inhaler Inhale 2 puffs into the lungs every 6 hours as needed for Wheezing       No current facility-administered medications for this visit. Family History   Problem Relation Age of Onset    Thyroid Disease Mother     Depression Mother     Asthma Mother     Arthritis Mother     Cancer Father     Arthritis Father     Diabetes Sister     Arthritis Sister     Cancer Maternal Uncle     Arthritis Paternal Grandfather     Cancer Paternal Grandfather     Arthritis Paternal Grandmother     Arthritis Maternal Grandfather     Arthritis Maternal Grandmother     Asthma Maternal Grandmother         Review of Systems:    General/Constitutional: She lost 19 pounds of weight from her old titration study performed in 2016 with a normal appetite. No fever or chills. HENT: Negative. Eyes: Negative. Upper respiratory tract: Occasional nasal stuffiness with no post nasal drip. Lower respiratory tract/ lungs: No cough or sputum production. No hemoptysis. Cardiovascular: No palpitations or chest pain. Gastrointestinal: No nausea or vomiting. Neurological: No focal neurologiacal weakness. Extremities: No edema. Musculoskeletal: No complaints. Genitourinary: No complaints. Hematological: Negative. Psychiatric/Behavioral: Negative. Skin: No itching. /66 (Site: Left Lower Arm, Position: Sitting, Cuff Size: Medium Adult)   Pulse 97   Temp 97.9 °F (36.6 °C)   Ht 5' 4\" (1.626 m)   Wt 266 lb 12.8 oz (121 kg)   SpO2 98% Comment: room air at rest  BMI 45.80 kg/m²   Mallampati airway Class: 3  Neck Circumference:. 16Inches  [place] sleepiness score 10/20/21: 10  Sleep apnea quality of life questionnaire:.52      Physical Exam   Nursing note and vitals reviewed.    Constitutional: Patient appears well built and well nourished. No distress. Patient is oriented to person, place, and time. HENT:   Head: Normocephalic and atraumatic. Right Ear: External ear normal.   Left Ear: External ear normal.   Mouth/Throat: Oropharynx is clear and moist.  No oral thrush. Eyes: Conjunctivae are normal. Pupils are equal, round, and reactive to light. No scleral icterus. Neck: Neck supple. No JVD present. No tracheal deviation present. Cardiovascular: Normal rate, regular rhythm, normal heart sounds. No murmur heard. Pulmonary/Chest: Effort normal and breath sounds normal. No stridor. No respiratory distress. No wheezes. No rales. Patient exhibits no tenderness. Abdominal: Soft. Patient exhibits no distension. No tenderness. Musculoskeletal: Normal range of motion. Extremities: Patient exhibits no edema and no tenderness. Lymphadenopathy:  No cervical adenopathy. Neurological: Patient is alert and oriented to person, place, and time. Skin: Skin is warm and dry. Patient is not diaphoretic. Psychiatric: Patient  has a normal mood and affect. Patient behavior is normal.     Diagnostic Data:    Ferritin level: Performed on 4 October 2021: 68 ng/mL      Sleep test:  She underwent a baseline sleep study at [facility] sleep lab on 20 October 2016. Total number of respiratory events were 217. AHI: 45.1  Maximal oxygen desaturation recorded: 73%  Patient spent a total of 4.66% of night below oxygen saturation less than 88%                CPAP test:  She underwent her CPAP titration study on 4 November 2016 at [facility] sleep lab. She was titrated to a CPAP pressure of 11 cm of water with room air. Her weight on the day of titration study was 285 pounds    Assessment:  -Severe obstructive sleep apnea diagnosed by a baseline sleep study performed on 20 October 2016. She was subsequently prescribed with a CPAP device with a pressure of 11 cm of water.   Patient lost 19 pounds of weight from her old baseline sleep study performed in 2016. She is not using her CPAP machine for the last 1 year due to lack of CPAP supplies including tubing and mask. She moved to a new house and last contact with her [employer] company Icarlene Perez.  -Inadequate sleep hygiene.  -Hypersomnia ( Excessive daytime sleepiness) may be due to untreated obstructive sleep apnea Vs Inadequate sleep hygiene. -Depression currently on treatment medications under moderate control  -Restless leg syndrome on treatment with Requip 1 mg p.o. nightly. She is currently following with her family physician. She was found to have a acceptable level of serum ferritin level.  -Allergic rhinitis with history of hayfever. She is currently on treatment with Singulair 10 mg p.o.nightly. She is also taking over-the-counter antihistaminics including Zyrtec/Claritin. -GERD on treatment with PPI  -Anxiety disorder on treatment medications. Recommendations/Plan:  -At the request of patient, she was given prescription for CPAP supplies to submit to [employer] .  -She will be sent to [employer]( [facility] for CPAP machine check up for proper functioning.   -She was advised to continue current positive airway pressure therapy with above described pressure.  -She advised to keep good compliance with current recommended pressure to get optimal results and clinical improvement.  -Follow with my clinic in 3months for clinical reevaluation with review of download. -She was advised to call [employer] regarding supplies if needed.  -She was advised to practice good sleep hygiene techniques. She was provided with a hand out. Ivan Kriss advised to loose weight by controlling diet and doing exercise.  She is currently following with [facility] weight management center with Dr. Naz Jimenez  -She was advised to keep her scheduled follow-up with KATHERIN Benitez regarding her restless leg syndrome on Requip therapy.  -She was instructed to not to drive any motor vehicles or operate heavy equipment if she feels sleepy. -She was advised call my office for earlier appointment if needed for worsening of sleep symptoms. Frank Rosas educated about my impression and plan. Patient verbalizes understanding.      -I personally reviewed updated the Past medical hx, Past surgical hx,Social hx, Family hx, Medications, Allergies in the discrete data section of the patient chart along with labs, Pulmonary medicine,Sleep medicine related, Pathological, Microbiological and Radiological investigations.

## 2021-10-04 ENCOUNTER — HOSPITAL ENCOUNTER (OUTPATIENT)
Age: 46
Discharge: HOME | End: 2021-10-04
Payer: COMMERCIAL

## 2021-10-04 ENCOUNTER — OFFICE VISIT (OUTPATIENT)
Dept: BARIATRICS/WEIGHT MGMT | Age: 46
End: 2021-10-04
Payer: COMMERCIAL

## 2021-10-04 ENCOUNTER — OFFICE VISIT (OUTPATIENT)
Dept: BARIATRICS/WEIGHT MGMT | Age: 46
End: 2021-10-04

## 2021-10-04 VITALS
TEMPERATURE: 96.3 F | HEIGHT: 66 IN | SYSTOLIC BLOOD PRESSURE: 108 MMHG | BODY MASS INDEX: 43.07 KG/M2 | WEIGHT: 268 LBS | HEART RATE: 80 BPM | DIASTOLIC BLOOD PRESSURE: 70 MMHG

## 2021-10-04 DIAGNOSIS — E11.9 TYPE 2 DIABETES MELLITUS WITHOUT COMPLICATION, WITHOUT LONG-TERM CURRENT USE OF INSULIN (HCC): ICD-10-CM

## 2021-10-04 DIAGNOSIS — E66.01 MORBID OBESITY WITH BMI OF 40.0-44.9, ADULT (HCC): ICD-10-CM

## 2021-10-04 DIAGNOSIS — E04.2 MULTINODULAR GOITER: ICD-10-CM

## 2021-10-04 DIAGNOSIS — E66.01 MORBID OBESITY WITH BMI OF 40.0-44.9, ADULT (HCC): Primary | ICD-10-CM

## 2021-10-04 DIAGNOSIS — G47.33 OBSTRUCTIVE SLEEP APNEA: ICD-10-CM

## 2021-10-04 DIAGNOSIS — E78.5 HYPERLIPIDEMIA, UNSPECIFIED HYPERLIPIDEMIA TYPE: ICD-10-CM

## 2021-10-04 DIAGNOSIS — Z01.818 PRE-OP TESTING: ICD-10-CM

## 2021-10-04 DIAGNOSIS — G25.81 RLS (RESTLESS LEGS SYNDROME): ICD-10-CM

## 2021-10-04 DIAGNOSIS — F41.9 ANXIETY: ICD-10-CM

## 2021-10-04 DIAGNOSIS — K21.9 GASTROESOPHAGEAL REFLUX DISEASE WITHOUT ESOPHAGITIS: ICD-10-CM

## 2021-10-04 DIAGNOSIS — F32.A DEPRESSION, UNSPECIFIED DEPRESSION TYPE: ICD-10-CM

## 2021-10-04 DIAGNOSIS — J45.909 UNCOMPLICATED ASTHMA, UNSPECIFIED ASTHMA SEVERITY, UNSPECIFIED WHETHER PERSISTENT: ICD-10-CM

## 2021-10-04 DIAGNOSIS — Z13.21 MALNUTRITION SCREEN: ICD-10-CM

## 2021-10-04 DIAGNOSIS — N32.81 OAB (OVERACTIVE BLADDER): ICD-10-CM

## 2021-10-04 DIAGNOSIS — E78.5 DYSLIPIDEMIA: ICD-10-CM

## 2021-10-04 LAB
ALBUMIN SERPL-MCNC: 4.2 G/DL (ref 3.5–5.1)
ALP BLD-CCNC: 68 U/L (ref 38–126)
ALT SERPL-CCNC: 14 U/L (ref 11–66)
ANION GAP SERPL CALCULATED.3IONS-SCNC: 13 MEQ/L (ref 8–16)
APTT: 31.9 SECONDS (ref 22–38)
AST SERPL-CCNC: 15 U/L (ref 5–40)
AVERAGE GLUCOSE: 147 MG/DL (ref 70–126)
BASOPHILS # BLD: 0.9 %
BASOPHILS ABSOLUTE: 0.1 THOU/MM3 (ref 0–0.1)
BILIRUB SERPL-MCNC: 0.5 MG/DL (ref 0.3–1.2)
BUN BLDV-MCNC: 12 MG/DL (ref 7–22)
CALCIUM SERPL-MCNC: 9.1 MG/DL (ref 8.5–10.5)
CHLORIDE BLD-SCNC: 105 MEQ/L (ref 98–111)
CHOLESTEROL, TOTAL: 131 MG/DL (ref 100–199)
CO2: 23 MEQ/L (ref 23–33)
CREAT SERPL-MCNC: 0.6 MG/DL (ref 0.4–1.2)
EKG ATRIAL RATE: 74 BPM
EKG P AXIS: 4 DEGREES
EKG P-R INTERVAL: 178 MS
EKG Q-T INTERVAL: 378 MS
EKG QRS DURATION: 74 MS
EKG QTC CALCULATION (BAZETT): 419 MS
EKG R AXIS: -14 DEGREES
EKG T AXIS: 20 DEGREES
EKG VENTRICULAR RATE: 74 BPM
EOSINOPHIL # BLD: 3.4 %
EOSINOPHILS ABSOLUTE: 0.2 THOU/MM3 (ref 0–0.4)
ERYTHROCYTE [DISTWIDTH] IN BLOOD BY AUTOMATED COUNT: 13.7 % (ref 11.5–14.5)
ERYTHROCYTE [DISTWIDTH] IN BLOOD BY AUTOMATED COUNT: 45.8 FL (ref 35–45)
FERRITIN: 68 NG/ML (ref 10–291)
FOLATE: 19.6 NG/ML (ref 4.8–24.2)
GFR SERPL CREATININE-BSD FRML MDRD: > 90 ML/MIN/1.73M2
GLUCOSE BLD-MCNC: 140 MG/DL (ref 70–108)
HBA1C MFR BLD: 6.9 % (ref 4.4–6.4)
HCT VFR BLD CALC: 39.2 % (ref 37–47)
HDLC SERPL-MCNC: 59 MG/DL
HEMOGLOBIN: 12.6 GM/DL (ref 12–16)
IMMATURE GRANS (ABS): 0.02 THOU/MM3 (ref 0–0.07)
IMMATURE GRANULOCYTES: 0.3 %
INR BLD: 0.93 (ref 0.85–1.13)
IRON: 54 UG/DL (ref 50–170)
LDL CHOLESTEROL CALCULATED: 53 MG/DL
LYMPHOCYTES # BLD: 20.8 %
LYMPHOCYTES ABSOLUTE: 1.4 THOU/MM3 (ref 1–4.8)
MCH RBC QN AUTO: 29.4 PG (ref 26–33)
MCHC RBC AUTO-ENTMCNC: 32.1 GM/DL (ref 32.2–35.5)
MCV RBC AUTO: 91.6 FL (ref 81–99)
MONOCYTES # BLD: 7 %
MONOCYTES ABSOLUTE: 0.5 THOU/MM3 (ref 0.4–1.3)
NUCLEATED RED BLOOD CELLS: 0 /100 WBC
PLATELET # BLD: 222 THOU/MM3 (ref 130–400)
PMV BLD AUTO: 11.3 FL (ref 9.4–12.4)
POTASSIUM SERPL-SCNC: 4.1 MEQ/L (ref 3.5–5.2)
PREALBUMIN: 21.4 MG/DL (ref 20–40)
PTH INTACT: 50.3 PG/ML (ref 15–65)
RBC # BLD: 4.28 MILL/MM3 (ref 4.2–5.4)
SEG NEUTROPHILS: 67.6 %
SEGMENTED NEUTROPHILS ABSOLUTE COUNT: 4.5 THOU/MM3 (ref 1.8–7.7)
SODIUM BLD-SCNC: 141 MEQ/L (ref 135–145)
TOTAL IRON BINDING CAPACITY: 269 UG/DL (ref 171–450)
TOTAL PROTEIN: 6.9 G/DL (ref 6.1–8)
TRIGL SERPL-MCNC: 97 MG/DL (ref 0–199)
TSH SERPL DL<=0.05 MIU/L-ACNC: 1.07 UIU/ML (ref 0.4–4.2)
VITAMIN B-12: 558 PG/ML (ref 211–911)
VITAMIN D 25-HYDROXY: 40 NG/ML (ref 30–100)
WBC # BLD: 6.7 THOU/MM3 (ref 4.8–10.8)

## 2021-10-04 PROCEDURE — 84590 ASSAY OF VITAMIN A: CPT

## 2021-10-04 PROCEDURE — 84443 ASSAY THYROID STIM HORMONE: CPT

## 2021-10-04 PROCEDURE — 82607 VITAMIN B-12: CPT

## 2021-10-04 PROCEDURE — 82306 VITAMIN D 25 HYDROXY: CPT

## 2021-10-04 PROCEDURE — G8427 DOCREV CUR MEDS BY ELIG CLIN: HCPCS | Performed by: PHYSICIAN ASSISTANT

## 2021-10-04 PROCEDURE — 85025 COMPLETE CBC W/AUTO DIFF WBC: CPT

## 2021-10-04 PROCEDURE — 83036 HEMOGLOBIN GLYCOSYLATED A1C: CPT

## 2021-10-04 PROCEDURE — 36415 COLL VENOUS BLD VENIPUNCTURE: CPT

## 2021-10-04 PROCEDURE — 80053 COMPREHEN METABOLIC PANEL: CPT

## 2021-10-04 PROCEDURE — 84134 ASSAY OF PREALBUMIN: CPT

## 2021-10-04 PROCEDURE — 2022F DILAT RTA XM EVC RTNOPTHY: CPT | Performed by: PHYSICIAN ASSISTANT

## 2021-10-04 PROCEDURE — 83550 IRON BINDING TEST: CPT

## 2021-10-04 PROCEDURE — G0480 DRUG TEST DEF 1-7 CLASSES: HCPCS

## 2021-10-04 PROCEDURE — 82728 ASSAY OF FERRITIN: CPT

## 2021-10-04 PROCEDURE — 99214 OFFICE O/P EST MOD 30 MIN: CPT | Performed by: PHYSICIAN ASSISTANT

## 2021-10-04 PROCEDURE — 85730 THROMBOPLASTIN TIME PARTIAL: CPT

## 2021-10-04 PROCEDURE — 83970 ASSAY OF PARATHORMONE: CPT

## 2021-10-04 PROCEDURE — 80307 DRUG TEST PRSMV CHEM ANLYZR: CPT

## 2021-10-04 PROCEDURE — 93005 ELECTROCARDIOGRAM TRACING: CPT

## 2021-10-04 PROCEDURE — 1036F TOBACCO NON-USER: CPT | Performed by: PHYSICIAN ASSISTANT

## 2021-10-04 PROCEDURE — G8417 CALC BMI ABV UP PARAM F/U: HCPCS | Performed by: PHYSICIAN ASSISTANT

## 2021-10-04 PROCEDURE — 84425 ASSAY OF VITAMIN B-1: CPT

## 2021-10-04 PROCEDURE — 80061 LIPID PANEL: CPT

## 2021-10-04 PROCEDURE — 83540 ASSAY OF IRON: CPT

## 2021-10-04 PROCEDURE — 3044F HG A1C LEVEL LT 7.0%: CPT | Performed by: PHYSICIAN ASSISTANT

## 2021-10-04 PROCEDURE — 82746 ASSAY OF FOLIC ACID SERUM: CPT

## 2021-10-04 PROCEDURE — 85610 PROTHROMBIN TIME: CPT

## 2021-10-04 PROCEDURE — G8484 FLU IMMUNIZE NO ADMIN: HCPCS | Performed by: PHYSICIAN ASSISTANT

## 2021-10-04 NOTE — PATIENT INSTRUCTIONS
Goals: 1. Nutrition Goal:  I will work on eating something small within an hour of waking up in an effort to not skip meals  2. Water Goal:  Great job increasing water intake continue at least ~ 5-6 bottles water a day  3. Exercise Goal:  Bring back exercise logs back to next visit. Do chair exercises at home at least 15-20 minutes  4. Watch online [employer] as support groups.

## 2021-10-04 NOTE — PATIENT INSTRUCTIONS
· Behavior modification discussed in detail in regards to dietary habits. · Nutritional education occurred during visit. Continue following recommendations  per dietitian. · Improvement in fitness/exercise discussed with patient and the need for this  with/without surgery. · Schedule orientation with Horacio Rush, Exercise Physiologist, at the fitness  center. · Evaluation and treatment for DARIEN   · Cardiac Clearance needed prior to surgery prn  · Pulmonary Clearance needed prior to surgery 10/20/21  · Psychology evaluation with Dr. Chivo Ortega in process, next apt 1/15/21  · Physical Therapy referral  · EGD prior to any surgical intervention  · Encouraged to attend support groups. · Goal to lose 3% TBW prior to surgery. · Seca scale next month  · Initial labs completed and reviewed-several pending  · A1C 6.9- must remain < 8 prior to surgery  · EKG completed and reviewed. · Advised not to get pregnant for 18-24 months post bariatric surgery as this can increase risk of malnourishment potentially leading to low birth weight or malformation. (hysterectomy)  · Will need to be off work for 3-4 weeks post-bariatric surgery. · No lifting/pushing/pulling over 20# for 4 weeks post-op  · No NSAIDS (Ibuprofen)10 days prior to bariatric surgery. Avoid NSAID use post-op. Will try switching to Tylenol.    · Discussed Lovenox post-op bariatric surgery  · Awaiting LOMN

## 2021-10-04 NOTE — PROGRESS NOTES
[address] [facility]  [address]  [address] [facility], [address] [facility]  [phone]      Visit Date:  10/4/2021  Weight Management Pre-Op Follow-up    HPI:    Medically Supervised follow-up- Month #1 of 6- desires sleejuliocesar Wallis is here today for continued supervised weight management of morbid obesity. Has gained about 70# in the last 2 years after stopping [employer]. Frustrated with weight. Reports that her weight is affecting her physically and mentally. Weight today 268#. Down 13# since last appointment. BMI 43. She reports that she is working on the behavior changes discussed at her initial appointment. Has started making recommended changes. Has started drinking 5-6 bottles of water daily. Has cut back on pop. Typically eating 1-2 meals daily- and understands that she needs to work on this. typically skips breakfast.  Has stopped eating cereal right before bedtime. Has been more mindful of food choices. Doing her best to make better decisions. Has not been eating fried foods. Avoids sweets most of the times. Does not smoke. Does not drink alcohol. Has a good support system through her family. Long discussion on importance of lifestyle changes, making better decisions with nutrition and increasing dedicated activity to be successful lifelong with weight loss with or without bariatric surgery. Comorbid conditions include sleep apnea, diabetes mellitus, anxiety, depression, asthma, Gerd, OAB, hyperlipidemia, RLS. Has not work CPAP in over 2 years. Apt with pulmonary 10/20/21. Fara Lloyd well controlled with  Prilosec. Discussed possibility of worsening GERD that may require additional medication or revision surgery. Initial labs completed and reviewed. Several still pending. A1C 6.9- must remain < 8 prior to surgery. Awaiting  LOMN. Has not yet completed support groups. EKG completed and reviewed. Discussed pre-op EGD. Pulmonary clearance needed prior to surgery. Psychological clearance with Dr. Tai Guidry in process, next apt 21. If she does not lose enough weight with medical management she would like to proceed with sleeve gastrectomy. Physical Activity:  Active at work. No dedicated physical activity. Current BMI: Body mass index is 43.26 kg/m². Current Weight:   Wt Readings from Last 3 Encounters:   10/04/21 268 lb (121.6 kg)   21 277 lb (125.6 kg)   21 281 lb 12.8 oz (127.8 kg)     Initial Body BQEMPR:215    Past Medical History:  Past Medical History:   Diagnosis Date    Allergic rhinitis     Anxiety     Asthma     Depression     Diabetes [address] [address] [facility]     Psychiatric problem     depression    Sleep apnea        Past Surgical History:  Past Surgical History:   Procedure Laterality Date    CARPAL TUNNEL RELEASE       SECTION      HYSTERECTOMY      KNEE ARTHROSCOPY         Past Social History:  Social History     Socioeconomic History    Marital status:      Spouse name: Not on file    Number of children: 3    Years of education: Not on file    Highest education level: Not on file   Occupational History    Not on file   Tobacco Use    Smoking status: Never Smoker    Smokeless tobacco: Never Used   Vaping Use    Vaping Use: Never used   Substance and Sexual Activity    Alcohol use: Yes     Comment: socially    Drug use: No    Sexual activity: Yes   Other Topics Concern    Not on file   Social History Narrative    Not on file     Social Determinants of Health     Financial Resource Strain:     Difficulty of Paying Living Expenses:    Food Insecurity:     Worried About Running Out of Food in the Last Year:     Ran Out of Food in the Last Year:    Transportation Needs:     Lack of Transportation (Medical):      Lack of Transportation (Non-Medical):    Physical Activity:     Days of Exercise per Week:     Minutes of Exercise per Session:    Stress:     Feeling of Stress :    Social Connections:     Frequency of ALLERGY) 10 MG CAPS Take 1 tablet by mouth daily       No current facility-administered medications for this visit. Allergies: Allergies   Allergen Reactions    Latex Rash    Keflex [Cephalexin] Rash    Betadine [Povidone Iodine]     Neosporin [Neomycin-Polymyxin-Gramicidin]     Pcn [Penicillins]     Rocephin [Ceftriaxone] Rash       Subjective:    Review of Systems:  Constitutional: Denies any fever, chills, 9+0fatigue. Wound: Denies any rash, skin color changes or wound problems. Resp: Denies any cough, shortness of breath. CV: Denies any chest pain, orthopnea or syncope. MS: Denies myalgias, (+)arthralgias. GI: Denies any nausea, vomiting, diarrhea, constipation, melena, hematochezia. (+) reflux  : Denies any hematuria, hesitancy or dysuria. NEURO: Denies seizures, headache. Objective:    /70 (Site: Right Upper Arm, Position: Sitting, Cuff Size: Large Adult)   Pulse 80   Temp 96.3 °F (35.7 °C) (Infrared)   Ht 5' 6\" (1.676 m)   Wt 268 lb (121.6 kg)   BMI 43.26 kg/m²   Constitutional: Alert and oriented to person, place and time. Well-developed, well- nourished. Head: Normocephalic and atraumatic  Neck: Supple. Eyes: EOMI b/l. Conjunctivae normal.  No scleral icterus. Respiratory: Effort normal. No respiratory distress. Abdomen: Obese  Ext:  Movement x 4. No edema  Skin; Warm and dry, no visible rashes, lesions or ulcers.    Neuro: Cranial Nerves Grossly Intact; nml coordination    CBC   Lab Results   Component Value Date    WBC 6.7 10/04/2021    RBC 4.28 10/04/2021    HGB 12.6 10/04/2021    HCT 39.2 10/04/2021    MCV 91.6 10/04/2021    MCH 29.4 10/04/2021    MCHC 32.1 10/04/2021    RDW 15.0 07/27/2017     10/04/2021    MPV 11.3 10/04/2021    RBCMORP NORMAL 07/27/2017    SEGSPCT 67.6 10/04/2021    LABLYMP 20.8 10/04/2021    MONOPCT 7.0 10/04/2021    LABEOS 3.4 10/04/2021    BASO 0.9 10/04/2021    NRBC 0 10/04/2021    ANISOCYTOSIS 1+ 07/27/2017    SEGSABS 4.5 10/04/2021    LYMPHSABS 1.4 10/04/2021    MONOSABS 0.5 10/04/2021    EOSABS 0.2 10/04/2021    BASOSABS 0.1 10/04/2021        BMP/CMP   Lab Results   Component Value Date    GLUCOSE 140 10/04/2021    CREATININE 0.6 10/04/2021    BUN 12 10/04/2021     10/04/2021    K 4.1 10/04/2021    K 4.3 12/05/2020     10/04/2021    CO2 23 10/04/2021    CALCIUM 9.1 10/04/2021    AST 15 10/04/2021    ALKPHOS 68 10/04/2021    PROT 6.9 10/04/2021    LABALBU 4.2 10/04/2021    BILITOT 0.5 10/04/2021    ALT 14 10/04/2021        PREALBUMIN   Lab Results   Component Value Date    PREALBUMIN 21.4 10/04/2021        VITAMIN B12   Lab Results   Component Value Date    HLJAGSPE86 169 10/04/2021        VITAMIN D   Lab Results   Component Value Date    VITD25 40 10/04/2021        PTH  Lab Results   Component Value Date    IPTH 50.3 10/04/2021       VITAMIN B1/ THIAMINE   No results found for: [id number]     LIPID SCREEN (FASTING)   Lab Results   Component Value Date    CHOL 131 10/04/2021    TRIG 97 10/04/2021    HDL 59 10/04/2021    LDLCALC 53 10/04/2021   ,     HGA1C (T2DM ONLY)   Lab Results   Component Value Date    LABA1C 6.9 10/04/2021    AVGG 147 10/04/2021        TSH   Lab Results   Component Value Date    TSH 1.070 10/04/2021        IRON   Lab Results   Component Value Date    IRON 54 10/04/2021        TIBC  Lab Results   Component Value Date    TIBC 269 10/04/2021       FERRITIN  Lab Results   Component Value Date    FERRITIN 68 10/04/2021       VITAMIN A  No results found for: RETINOL    NICOTINE  No results found for: NMET    UDS  No results found for: UDP    PSA  No results found for: LABPSA    GFR  Lab Results   Component Value Date    LABGLOM >90 10/04/2021       DEXA  No results found for this or any previous visit. Assessment:       Diagnosis Orders   1. BMI 40.0-44.9, adult ([address]     2. Type 2 diabetes mellitus without complication, without long-term current use of insulin ([facility]     3. Anxiety     4. Depression, unspecified depression type     5. Uncomplicated asthma, unspecified asthma severity, unspecified whether persistent     6. RLS (restless legs syndrome)     7. Obstructive sleep apnea     8. Gastroesophageal reflux disease without esophagitis     9. OAB (overactive bladder)     10. Hyperlipidemia, unspecified hyperlipidemia type         Plan:    · Behavior modification discussed in detail in regards to dietary habits. · Nutritional education occurred during visit. Continue following recommendations  per dietitian. · Improvement in fitness/exercise discussed with patient and the need for this  with/without surgery. · Schedule orientation with Courtney Nayak, Exercise Physiologist, at the fitness  center. · Evaluation and treatment for DARIEN-10/20/21  · Cardiac Clearance needed prior to surgery prn  · Pulmonary Clearance needed prior to surgery 10/20/21  · Psychology evaluation with Dr. Cecilia Tamayo in process, next apt 1/15/21  · Physical Therapy referral  · EGD prior to any surgical intervention  · Encouraged to attend support groups. · Goal to lose 3% TBW prior to surgery. · Seca scale next month  · Initial labs completed and reviewed-several pending  · A1C 6.9- must remain < 8 prior to surgery  · EKG completed and reviewed. · Advised not to get pregnant for 18-24 months post bariatric surgery as this can increase risk of malnourishment potentially leading to low birth weight or malformation. (hysterectomy)  · Will need to be off work for 3-4 weeks post-bariatric surgery. · No lifting/pushing/pulling over 20# for 4 weeks post-op  · No NSAIDS (Ibuprofen)10 days prior to bariatric surgery. Avoid NSAID use post-op. Will try switching to Tylenol. · Discussed Lovenox post-op bariatric surgery  · Awaiting LOMN   Return in about 1 month (around 11/4/2021) for Follow up. I spent over 30 minutes with the patient, with greater that 50% of that time spent on education, counseling and coordination of care.

## 2021-10-07 LAB
RETINOL (VITAMIN A): NORMAL
URINE DRUG PROFILE: NORMAL

## 2021-10-08 LAB
3-OH-COTININE: < 2 NG/ML
COTININE: < 2 NG/ML
NICOTINE: < 2 NG/ML
VITAMIN B1 WHOLE BLOOD: 130 NMOL/L (ref 70–180)

## 2021-10-12 DIAGNOSIS — Z01.818 PRE-OP TESTING: Primary | ICD-10-CM

## 2021-10-20 ENCOUNTER — INITIAL CONSULT (OUTPATIENT)
Dept: PULMONOLOGY | Age: 46
End: 2021-10-20
Payer: COMMERCIAL

## 2021-10-20 VITALS
HEART RATE: 97 BPM | TEMPERATURE: 97.9 F | DIASTOLIC BLOOD PRESSURE: 66 MMHG | OXYGEN SATURATION: 98 % | BODY MASS INDEX: 45.55 KG/M2 | SYSTOLIC BLOOD PRESSURE: 110 MMHG | WEIGHT: 266.8 LBS | HEIGHT: 64 IN

## 2021-10-20 DIAGNOSIS — G47.33 OSA (OBSTRUCTIVE SLEEP APNEA): Primary | ICD-10-CM

## 2021-10-20 DIAGNOSIS — G47.33 OSA ON CPAP: ICD-10-CM

## 2021-10-20 DIAGNOSIS — Z99.89 OSA ON CPAP: ICD-10-CM

## 2021-10-20 DIAGNOSIS — G47.10 HYPERSOMNIA: ICD-10-CM

## 2021-10-20 PROCEDURE — 99204 OFFICE O/P NEW MOD 45 MIN: CPT | Performed by: INTERNAL MEDICINE

## 2021-10-20 PROCEDURE — 1036F TOBACCO NON-USER: CPT | Performed by: INTERNAL MEDICINE

## 2021-10-20 PROCEDURE — G8427 DOCREV CUR MEDS BY ELIG CLIN: HCPCS | Performed by: INTERNAL MEDICINE

## 2021-10-20 PROCEDURE — G8417 CALC BMI ABV UP PARAM F/U: HCPCS | Performed by: INTERNAL MEDICINE

## 2021-10-20 PROCEDURE — G8484 FLU IMMUNIZE NO ADMIN: HCPCS | Performed by: INTERNAL MEDICINE

## 2021-10-20 NOTE — PROGRESS NOTES
Chief Complaint: New sleep consult ref Dr Twan Maloney pft 10/20/16, cpap 11/4/16    Mallampati airway Class: 3  Neck Circumference:.  16Inches    [place] sleepiness score 10/20/21: 10  Sleep apnea quality of life questionnaire:.52

## 2021-10-20 NOTE — PATIENT INSTRUCTIONS
Recommendations/Plan:  -At the request of patient, she was given prescription for CPAP supplies to submit to [address] [place], [place] [address]barb .  -She will be sent to [facility]( [facility] [employer] for CPAP machine check up for proper functioning.   -She was advised to continue current positive airway pressure therapy with above described pressure.  -She advised to keep good compliance with current recommended pressure to get optimal results and clinical improvement.  -Follow with my clinic in 3months for clinical reevaluation with review of download. -She was advised to call [employer] regarding supplies if needed.  -She was advised to practice good sleep hygiene techniques. She was provided with a hand out. Catrachito Mckeon advised to loose weight by controlling diet and doing exercise. She is currently following with [facility] with Dr. Rachel Single  -She was advised to keep her scheduled follow-up with KATHERIN Power regarding her restless leg syndrome on Requip therapy.  -She was instructed to not to drive any motor vehicles or operate heavy equipment if she feels sleepy. -She was advised call my office for earlier appointment if needed for worsening of sleep symptoms. Sol Ferrara educated about my impression and plan. Patient verbalizes understanding.

## 2021-11-01 NOTE — PROGRESS NOTES
Assessment: Patient is a 51 y.o. female seen for month two  follow up MNT visit for  pre op bariatric surgery desires sleeve    Vitals from current and previous visits:    Vitals 40/7/2120   SYSTOLIC 089   DIASTOLIC 76   Site Left Upper Arm   Position Sitting   Cuff Size Large Adult   Pulse 100   Temp 98.1   Resp    SpO2    Weight 268 lb   Height 5' 6\"   Body mass index 43.25 kg/m2   Pain Level    Waist (Inches)    Neck circumference        Initial weight at start of Weight Management Program was: 281 lbs     Tavares Khoury weight is stable from last month  -Weight goal: lose weight.     -Nutritionally relevant labs: Initial labs-  HA1C today was 6.9%, Vitamin D-40, Iron levels WNL. B1-130  Lab Results   Component Value Date/Time    LABA1C 6.9 (H) 10/04/2021 11:07 AM    LABA1C 6.5 (H) 11/16/2017 11:04 AM    LABA1C 8.6 08/10/2017 04:24 PM    GLUCOSE 140 (H) 10/04/2021 11:06 AM    GLUCOSE 184 (H) 12/05/2020 01:20 PM    CHOL 131 10/04/2021 11:06 AM    HDL 59 10/04/2021 11:06 AM    LDLCALC 53 10/04/2021 11:06 AM    TRIG 97 10/04/2021 11:06 AM     Initial Dr Marcelene Libman visit was 9/17/21- follow up scheduled January 5th   DARIEN consult  completed- got new script for CPAP mask- today pt states can't find power cord for machine    - Is patient taking daily Multivitamin:  Does take a MVI-  taking Women's gummies MVI two per day    Works 7:30a-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   Goes to bed between 10-11pm  Will wake up at 6:30am and doesn't work till later then sleeps till 10am-11am  -Food Recall:   Pt had food journal in office  Breakfast: now trying to eat breakfast- three scrambled eggs or cup of cottage cheese and peaches and cup of coffee  Lunch:  [employer] lunch on go kits- chicken salad and tuna salad and CarbMaster Smoothies or brings a salad  Dinner: ~ last nite had spaghetti or spouse cooks meats and vegetables  Snacks: CarbMaster Smoothies, Ultra Silk Protein Chocolate [place] Milk ( 20 grams protein)  Main Beverages:  up to 5-6  bottles water a day and more at work, ~ down to 1 cup coffee /day with artificial sweetener (splenda) and powder creamer, cut all diet pop out this month, no tea     -Impression of Dietary Intake: improvement with eating breakfast in morning - Pt  is working towards avoiding high fat/high sugar foods. Pt  is working towards  including protein at meals and snacks. Exercise:    -Physical Activity is:    Pt doing Chair Exercises when gets off work. Pt may [facility]  Provided patient with exercise log to record daily activity and return to office each month. Recommendation given to record activity at least 24 days of the month. This will be submitted to insurance prior to approval for bariatric surgery. Nutrition Diagnosis: Overweight/Obesity related to currently undergoing MNT as evidenced by BMI of 43.2  Intervention:   Healthy behaviors: eliminated carbonated beverages and adequate fluid intake  Patient has attended support groups via You Tube Times two. Monthly goals with pt. Patient Instructions   Goals:  1. Nutrition Goal:  I will continue to work on eating something small within an hour of waking up in an effort to not skip meals  2. Water Goal:  Great job increasing water intake continue at least ~ 5-6 bottles water a day  3. Exercise Goal:  Bring back exercise logs back to next visit- Month 1 and 3. Do chair exercises at home at least 15-20 minutes         -Followup visit: 4 weeks with dietitian.          Mercedes Muñoz, RD, LD,   Dietitian- Weight Management [facility] [address]

## 2021-11-02 ENCOUNTER — OFFICE VISIT (OUTPATIENT)
Dept: BARIATRICS/WEIGHT MGMT | Age: 46
End: 2021-11-02

## 2021-11-02 ENCOUNTER — OFFICE VISIT (OUTPATIENT)
Dept: BARIATRICS/WEIGHT MGMT | Age: 46
End: 2021-11-02
Payer: COMMERCIAL

## 2021-11-02 VITALS — BODY MASS INDEX: 43.92 KG/M2 | HEIGHT: 66 IN

## 2021-11-02 VITALS
SYSTOLIC BLOOD PRESSURE: 110 MMHG | TEMPERATURE: 98.1 F | WEIGHT: 268 LBS | HEART RATE: 100 BPM | DIASTOLIC BLOOD PRESSURE: 76 MMHG | BODY MASS INDEX: 43.07 KG/M2 | HEIGHT: 66 IN

## 2021-11-02 DIAGNOSIS — J45.909 UNCOMPLICATED ASTHMA, UNSPECIFIED ASTHMA SEVERITY, UNSPECIFIED WHETHER PERSISTENT: ICD-10-CM

## 2021-11-02 DIAGNOSIS — G25.81 RLS (RESTLESS LEGS SYNDROME): ICD-10-CM

## 2021-11-02 DIAGNOSIS — F41.9 ANXIETY: ICD-10-CM

## 2021-11-02 DIAGNOSIS — K21.9 GASTROESOPHAGEAL REFLUX DISEASE WITHOUT ESOPHAGITIS: ICD-10-CM

## 2021-11-02 DIAGNOSIS — E11.9 TYPE 2 DIABETES MELLITUS WITHOUT COMPLICATION, WITHOUT LONG-TERM CURRENT USE OF INSULIN (HCC): ICD-10-CM

## 2021-11-02 DIAGNOSIS — E78.5 HYPERLIPIDEMIA, UNSPECIFIED HYPERLIPIDEMIA TYPE: ICD-10-CM

## 2021-11-02 DIAGNOSIS — G47.33 OBSTRUCTIVE SLEEP APNEA: ICD-10-CM

## 2021-11-02 DIAGNOSIS — E66.01 MORBID OBESITY WITH BMI OF 40.0-44.9, ADULT (HCC): Primary | ICD-10-CM

## 2021-11-02 DIAGNOSIS — F32.A DEPRESSION, UNSPECIFIED DEPRESSION TYPE: ICD-10-CM

## 2021-11-02 DIAGNOSIS — N32.81 OAB (OVERACTIVE BLADDER): ICD-10-CM

## 2021-11-02 PROCEDURE — 2022F DILAT RTA XM EVC RTNOPTHY: CPT | Performed by: PHYSICIAN ASSISTANT

## 2021-11-02 PROCEDURE — 3044F HG A1C LEVEL LT 7.0%: CPT | Performed by: PHYSICIAN ASSISTANT

## 2021-11-02 PROCEDURE — 99213 OFFICE O/P EST LOW 20 MIN: CPT | Performed by: PHYSICIAN ASSISTANT

## 2021-11-02 PROCEDURE — 1036F TOBACCO NON-USER: CPT | Performed by: PHYSICIAN ASSISTANT

## 2021-11-02 PROCEDURE — G8484 FLU IMMUNIZE NO ADMIN: HCPCS | Performed by: PHYSICIAN ASSISTANT

## 2021-11-02 PROCEDURE — G8427 DOCREV CUR MEDS BY ELIG CLIN: HCPCS | Performed by: PHYSICIAN ASSISTANT

## 2021-11-02 PROCEDURE — G8417 CALC BMI ABV UP PARAM F/U: HCPCS | Performed by: PHYSICIAN ASSISTANT

## 2021-11-02 NOTE — PATIENT INSTRUCTIONS
Goals: 1. Nutrition Goal:  I will continue to work on eating something small within an hour of waking up in an effort to not skip meals  2. Water Goal:  Great job increasing water intake continue at least ~ 5-6 bottles water a day  3. Exercise Goal:  Bring back exercise logs back to next visit- Month 1 and 3.   Do chair exercises at home at least 15-20 minutes

## 2021-11-02 NOTE — PATIENT INSTRUCTIONS
· Behavior modification discussed in detail in regards to dietary habits. · Nutritional education occurred during visit. Continue following recommendations  per dietitian. · Improvement in fitness/exercise discussed with patient and the need for this  with/without surgery. · Cardiac Clearance needed prior to surgery prn  · Call and check on CPAP equipment   · Pulmonary Clearance needed prior to surgery - Dr. Elio Alexandra 1/26/22  · Psychology evaluation with Dr. Shamika Francisco in process, next apt 1/15/21  · Physical Therapy referral  · EGD prior to any surgical intervention- will send referral  · Encouraged to attend support groups. · Goal to lose 3% TBW prior to surgery. Currently down 13#  · Seca scale completed and reviewed. · Initial labs completed and reviewed  · A1C 6.9- must remain < 8 prior to surgery  · EKG completed and reviewed. · Advised not to get pregnant for 18-24 months post bariatric surgery as this can increase risk of malnourishment potentially leading to low birth weight or malformation. (hysterectomy)  · Will need to be off work for 3-4 weeks post-bariatric surgery. · No lifting/pushing/pulling over 20# for 4 weeks post-op  · No NSAIDS (Ibuprofen)10 days prior to bariatric surgery. Avoid NSAID use post-op. Will try switching to Tylenol. · Discussed Lovenox post-op bariatric surgery  · Awaiting LOMN - patient to call and check on this.

## 2021-11-02 NOTE — PROGRESS NOTES
[address]  [address]  [place], [address] [facility]  [phone]      Visit Date:  11/2/2021  Weight Management Pre-Op Follow-up    HPI:    Medically Supervised follow-up- Month #2 of 6- desires sleeve    Sol Ferrara is here today for continued supervised weight management of morbid obesity. Weight today 268#. Weight is stable  since last month. Down 13# since starting with weight management. BMI 43. Nutrition has been on and off over the month. Struggled with eating sweets/candy over Halloween. Has recently gotten back on track. Has recently gotten back to tracking. Has been eating 6 small meals daily since last apt. Has not been eating past 8pm.   Continues to drink plenty of water. Drinking 1 cup of coffee daily. Has been getting in dedicated physical activity daily- walking or chair exercises. Comorbid conditions include sleep apnea, diabetes mellitus, anxiety, depression, asthma, Gerd, OAB, hyperlipidemia, RLS. Doty Body well controlled with Prilosec. Discussed possibility of worsening GERD that may require additional medication or revision surgery. Recent A1C 6.9- must remain < 8 prior to surgery. Checking sugars daily and running 120-170. Awaiting  LOMN- patient will call and check on this. Has not yet completed support groups. EKG completed and reviewed. Discussed pre-op EGD- will send referral. Had follow up with pulmonary and supplies ordered for CPAP- awaiting on tubing/power cord. Pulmonary clearance needed prior to surgery- next apt 1/26/22. Psychological clearance with Dr. Jacinto Moctezuma in process, next apt 1/12/21. Seca scale completed and reviewed. If she does not lose enough weight with medical management she would like to proceed with sleeve gastrectomy. Physical Activity: walking/ chair exercises    Current BMI: Body mass index is 43.92 kg/m².   Current Weight:   Wt Readings from Last 3 Encounters:   11/02/21 268 lb (121.6 kg)   10/20/21 266 lb 12.8 oz (121 kg)   10/04/21 268 lb (121.6 kg)     Initial Body [id number]    Past Medical History:  Past Medical History:   Diagnosis Date    Allergic rhinitis     Anxiety     Asthma     Depression     [facility]     Psychiatric problem     depression    Sleep apnea        Past Surgical History:  Past Surgical History:   Procedure Laterality Date    CARPAL TUNNEL RELEASE       SECTION      HYSTERECTOMY      KNEE ARTHROSCOPY         Past Social History:  Social History     Socioeconomic History    Marital status:      Spouse name: Not on file    Number of children: 3    Years of education: Not on file    Highest education level: Not on file   Occupational History    Not on file   Tobacco Use    Smoking status: Never Smoker    Smokeless tobacco: Never Used   Vaping Use    Vaping Use: Never used   Substance and Sexual Activity    Alcohol use: Yes     Comment: socially    Drug use: No    Sexual activity: Yes   Other Topics Concern    Not on file   Social History Narrative    Not on file     Social Determinants of Health     Financial Resource Strain:     Difficulty of Paying Living Expenses:    Food Insecurity:     Worried About Running Out of Food in the Last Year:     Ran Out of Food in the Last Year:    Transportation Needs:     Lack of Transportation (Medical):      Lack of Transportation (Non-Medical):    Physical Activity:     Days of Exercise per Week:     Minutes of Exercise per Session:    Stress:     Feeling of Stress :    Social Connections:     Frequency of Communication with Friends and Family:     Frequency of Social Gatherings with Friends and Family:     Attends Denominational Services:     Active Member of Clubs or Organizations:     Attends Club or Organization Meetings:     Marital Status:    Intimate Partner Violence:     Fear of Current or Ex-Partner:     Emotionally Abused:     Physically Abused:     Sexually Abused:         Medications: Current Outpatient Medications   Medication Sig Dispense Refill    traZODone (DESYREL) 150 MG tablet Take 150 mg by mouth nightly      Dulaglutide (TRULICITY) 3.72 CU/0.8KS SOPN Inject 0.75 mg into the skin once a week      omeprazole (PRILOSEC) 40 MG delayed release capsule Take 1 capsule by mouth daily      oxybutynin (DITROPAN-XL) 5 MG extended release tablet Take 1 tablet by mouth daily       atorvastatin (LIPITOR) 40 MG tablet TAKE ONE TABLET BY MOUTH DAILY 30 tablet 3    metFORMIN (GLUCOPHAGE) 1000 MG tablet TAKE ONE TABLET BY MOUTH TWICE A DAY WITH MEALS (Patient taking differently: See Admin Instructions 500 mg in AM, 1000mg in PM.) 60 tablet 3    ibuprofen (ADVIL;MOTRIN) 600 MG tablet Take 1 tablet by mouth every 6 hours as needed for Pain 30 tablet 0    hydrOXYzine (ATARAX) 10 MG tablet Take 10 mg by mouth 3 times daily as needed for Itching      Cetirizine HCl (ZYRTEC ALLERGY) 10 MG CAPS Take 1 tablet by mouth daily      FLUoxetine (PROZAC) 20 MG capsule Take 40 mg by mouth daily       montelukast (SINGULAIR) 10 MG tablet Take 10 mg by mouth nightly      rOPINIRole (REQUIP) 0.25 MG tablet Take 4 mg by mouth nightly       albuterol sulfate  (90 BASE) MCG/ACT inhaler Inhale 2 puffs into the lungs every 6 hours as needed for Wheezing      CPAP Machine MISC by Does not apply route Please check patient's CPAP machine for proper functioning by [employer]. She was prescribed with a CPAP with a pressure of 11 cm of water with room air. (Patient not taking: Reported on 11/2/2021) 1 each 0    Blood Glucose Monitoring Suppl (ACCU-CHEK ROD PLUS) w/Device KIT Check blood sugar once daily 1 kit 0    glucose blood VI test strips (ACCU-CHEK ROD PLUS) strip Check blood sugar once daily 100 each 3    Lancets MISC Use to test blood glucose level 1 time per day. Diagnosis: E11.9 100 each 1     No current facility-administered medications for this visit. Allergies:    Allergies   Allergen Reactions    Latex Rash    Keflex [Cephalexin] Rash    Betadine [Povidone Iodine]     Neosporin [Neomycin-Polymyxin-Gramicidin]     Pcn [Penicillins]     Rocephin [Ceftriaxone] Rash       Subjective:    Review of Systems:  Constitutional: Denies any fever, chills, 9+0fatigue. Wound: Denies any rash, skin color changes or wound problems. Resp: Denies any cough, shortness of breath. CV: Denies any chest pain, orthopnea or syncope. MS: Denies myalgias, (+)arthralgias. GI: Denies any nausea, vomiting, diarrhea, constipation, melena, hematochezia. (+) reflux  : Denies any hematuria, hesitancy or dysuria. NEURO: Denies seizures, headache. Objective:    /76 (Site: Left Upper Arm, Position: Sitting, Cuff Size: Large Adult)   Pulse 100   Temp 98.1 °F (36.7 °C) (Infrared)   Ht 5' 5.5\" (1.664 m)   Wt 268 lb (121.6 kg)   BMI 43.92 kg/m²   Physical Examination:   Constitutional: Alert and oriented to person, place and time. Well-developed, well- nourished. Head: Normocephalic and atraumatic  Neck: Supple. Eyes: EOMI b/l. Conjunctivae normal.  No scleral icterus. Respiratory: Effort normal. No respiratory distress. Abd: Benign  Ext:  Movement x 4. No edema  Skin; Warm and dry, no visible rashes, lesions or ulcers.    Neuro: Cranial Nerves Grossly Intact; nml coordination      CBC   Lab Results   Component Value Date    WBC 6.7 10/04/2021    RBC 4.28 10/04/2021    HGB 12.6 10/04/2021    HCT 39.2 10/04/2021    MCV 91.6 10/04/2021    MCH 29.4 10/04/2021    MCHC 32.1 10/04/2021    RDW 15.0 07/27/2017     10/04/2021    MPV 11.3 10/04/2021    RBCMORP NORMAL 07/27/2017    SEGSPCT 67.6 10/04/2021    LABLYMP 20.8 10/04/2021    MONOPCT 7.0 10/04/2021    LABEOS 3.4 10/04/2021    BASO 0.9 10/04/2021    NRBC 0 10/04/2021    ANISOCYTOSIS 1+ 07/27/2017    SEGSABS 4.5 10/04/2021    LYMPHSABS 1.4 10/04/2021    MONOSABS 0.5 10/04/2021    EOSABS 0.2 10/04/2021    BASOSABS 0.1 10/04/2021        BMP/CMP depression type     5. Uncomplicated asthma, unspecified asthma severity, unspecified whether persistent     6. RLS (restless legs syndrome)     7. Obstructive sleep apnea     8. Gastroesophageal reflux disease without esophagitis     9. OAB (overactive bladder)     10. Hyperlipidemia, unspecified hyperlipidemia type         Plan:    · Behavior modification discussed in detail in regards to dietary habits. · Nutritional education occurred during visit. Continue following recommendations  per dietitian. · Improvement in fitness/exercise discussed with patient and the need for this  with/without surgery. · Cardiac Clearance needed prior to surgery prn  · Call and check on CPAP equipment. · Pulmonary Clearance needed prior to surgery - Dr. Loretta Catsanon 1/26/22  · Psychology evaluation with Dr. Cecilia Tamayo in process, next apt 1/15/21  · Physical Therapy referral  · EGD prior to any surgical intervention- will send referral  · Encouraged to attend support groups. · Goal to lose 3% TBW prior to surgery. Currently down 13#  · Seca scale completed and reviewed. · Initial labs completed and reviewed  · A1C 6.9- must remain < 8 prior to surgery  · EKG completed and reviewed. · Advised not to get pregnant for 18-24 months post bariatric surgery as this can increase risk of malnourishment potentially leading to low birth weight or malformation. (hysterectomy)  · Will need to be off work for 3-4 weeks post-bariatric surgery. · No lifting/pushing/pulling over 20# for 4 weeks post-op  · No NSAIDS (Ibuprofen)10 days prior to bariatric surgery. Avoid NSAID use post-op. Will try switching to Tylenol. · Discussed Lovenox post-op bariatric surgery  · Awaiting LOMN - patient to call and check on this. · Mammogram completed in [facility]- will call for results  Return in about 1 month (around 12/2/2021) for Follow up.      I spent over 20 minutes with the patient, with greater that 50% of that time spent on education, counseling and coordination of care.      Electronically signed by DANIELITO Bautista on 11/2/2021 at 12:23 PM

## 2021-12-06 NOTE — PROGRESS NOTES
Assessment: Patient is a 51 y.o. female seen for month three  follow up MNT visit for  pre op bariatric surgery desires sleeve    Vitals from current and previous visits:    Vitals 09/2/1608   SYSTOLIC 930   DIASTOLIC 76   Site Left Upper Arm   Position Sitting   Cuff Size Large Adult   Pulse 100   Temp 98.1   Resp    SpO2    Weight 268 lb   Height 5' 6\"   Body mass index 43.25 kg/m2   Pain Level    Waist (Inches)    Neck circumference      Vitals 63/8/0629   SYSTOLIC 968   DIASTOLIC 82   Site Right Upper Arm   Position Sitting   Cuff Size Large Adult   Pulse 72   Temp    Resp 18   SpO2    Weight 265 lb 12.8 oz   Height 5' 5.5\"   Body mass index 43.55 kg/m2   Pain Level    Waist (Inches)    Neck circumference      Initial weight at start of Weight Management Program was: 281 lbs     Levar Lock lost 3 lbs from last visit  -Weight goal: lose weight.     -Nutritionally relevant labs: Initial labs-  HA1C today was 6.9%, Vitamin D-40, Iron levels WNL. B1-130  Lab Results   Component Value Date/Time    LABA1C 6.9 (H) 10/04/2021 11:07 AM    LABA1C 6.5 (H) 11/16/2017 11:04 AM    LABA1C 8.6 08/10/2017 04:24 PM    GLUCOSE 140 (H) 10/04/2021 11:06 AM    GLUCOSE 184 (H) 12/05/2020 01:20 PM    CHOL 131 10/04/2021 11:06 AM    HDL 59 10/04/2021 11:06 AM    LDLCALC 53 10/04/2021 11:06 AM    TRIG 97 10/04/2021 11:06 AM     Initial Dr Antelmo Vanegas visit was 9/17/21- follow up scheduled January 5th   DARIEN consult  completed- got new script for CPAP mask-  states can't find power cord for machine- now today reports getting a new machine    - Is patient taking daily Multivitamin:  Does take a MVI-  taking Women's gummies MVI two per day    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   States went up to 273lbs after Thanksgiving so last two weeks only eating once a day and shakes  Breakfast: Slim Fast Shake  Lunch:  Smoothie CarbMaster mid morning. Salad pre-made  with chicken, and shredded cheese with ranch dressing  Dinner: ~Sald and Fiber One Brownie  Snacks: CarbMaster Smoothies,  Main Beverages:  started drinking a tea called \"Smooth Move\" 8 oz with splenda in evening, up to 5-6  bottles water a day and more at work, ~ down to 1 cup coffee /day with artificial sweetener (splenda) and powder creamer, cut all diet pop      -Impression of Dietary Intake: has started meal replacement shakes and eating very little foods  Pt  is working towards avoiding high fat/high sugar foods. Pt  is working towards  including protein at meals and snacks. Exercise:    -Physical Activity is:    Pt doing Chair Exercises when gets off work and states doing \"a lot of walking\"  Provided patient with exercise log to record daily activity and return to office each month. Recommendation given to record activity at least 24 days of the month. This will be submitted to insurance prior to approval for bariatric surgery. Nutrition Diagnosis: Overweight/Obesity related to currently undergoing MNT as evidenced by BMI of 43.5  Intervention:  Reviewed with patient importance of eating foods at meal times to avoid burn out of replacement shakes and actually implementing behaviors recommended for bariatric surgery to be successful. Patient has attended support groups via You Tube Times two. Monthly goals with pt. Patient Instructions   Goals:  1. Nutrition Goal:  I will continue to work on eating something small within an hour of waking up in an effort to not skip meals- goal is to eat foods at meal times  2. Water Goal:  Great job increasing water intake continue at least ~ 5-6 bottles water a day  3.  Exercise Goal: Continue to do  chair exercises at home at least 15-20 minutes every day  Can call office to schedule next month visit when make rest of program fee payment of $499.- [phone]         -Followup visit: to be determined when makes program fee payment        Brittany Conklin RD, LD,   Dietitian- Weight Management [address]

## 2021-12-07 ENCOUNTER — OFFICE VISIT (OUTPATIENT)
Dept: BARIATRICS/WEIGHT MGMT | Age: 46
End: 2021-12-07

## 2021-12-07 ENCOUNTER — OFFICE VISIT (OUTPATIENT)
Dept: BARIATRICS/WEIGHT MGMT | Age: 46
End: 2021-12-07
Payer: COMMERCIAL

## 2021-12-07 VITALS
RESPIRATION RATE: 18 BRPM | HEIGHT: 66 IN | WEIGHT: 265.8 LBS | BODY MASS INDEX: 42.72 KG/M2 | SYSTOLIC BLOOD PRESSURE: 122 MMHG | HEART RATE: 72 BPM | DIASTOLIC BLOOD PRESSURE: 82 MMHG

## 2021-12-07 DIAGNOSIS — E11.9 TYPE 2 DIABETES MELLITUS WITHOUT COMPLICATION, WITHOUT LONG-TERM CURRENT USE OF INSULIN (HCC): ICD-10-CM

## 2021-12-07 DIAGNOSIS — G25.81 RLS (RESTLESS LEGS SYNDROME): ICD-10-CM

## 2021-12-07 DIAGNOSIS — J45.909 UNCOMPLICATED ASTHMA, UNSPECIFIED ASTHMA SEVERITY, UNSPECIFIED WHETHER PERSISTENT: ICD-10-CM

## 2021-12-07 DIAGNOSIS — N32.81 OAB (OVERACTIVE BLADDER): ICD-10-CM

## 2021-12-07 DIAGNOSIS — E66.01 MORBID OBESITY WITH BMI OF 40.0-44.9, ADULT (HCC): Primary | ICD-10-CM

## 2021-12-07 DIAGNOSIS — F41.9 ANXIETY: ICD-10-CM

## 2021-12-07 DIAGNOSIS — E78.5 HYPERLIPIDEMIA, UNSPECIFIED HYPERLIPIDEMIA TYPE: ICD-10-CM

## 2021-12-07 DIAGNOSIS — K21.9 GASTROESOPHAGEAL REFLUX DISEASE WITHOUT ESOPHAGITIS: ICD-10-CM

## 2021-12-07 DIAGNOSIS — F32.A DEPRESSION, UNSPECIFIED DEPRESSION TYPE: ICD-10-CM

## 2021-12-07 DIAGNOSIS — G47.33 OBSTRUCTIVE SLEEP APNEA: ICD-10-CM

## 2021-12-07 PROCEDURE — G8427 DOCREV CUR MEDS BY ELIG CLIN: HCPCS | Performed by: PHYSICIAN ASSISTANT

## 2021-12-07 PROCEDURE — 99213 OFFICE O/P EST LOW 20 MIN: CPT | Performed by: PHYSICIAN ASSISTANT

## 2021-12-07 PROCEDURE — 2022F DILAT RTA XM EVC RTNOPTHY: CPT | Performed by: PHYSICIAN ASSISTANT

## 2021-12-07 PROCEDURE — G8484 FLU IMMUNIZE NO ADMIN: HCPCS | Performed by: PHYSICIAN ASSISTANT

## 2021-12-07 PROCEDURE — 1036F TOBACCO NON-USER: CPT | Performed by: PHYSICIAN ASSISTANT

## 2021-12-07 PROCEDURE — 3044F HG A1C LEVEL LT 7.0%: CPT | Performed by: PHYSICIAN ASSISTANT

## 2021-12-07 PROCEDURE — G8417 CALC BMI ABV UP PARAM F/U: HCPCS | Performed by: PHYSICIAN ASSISTANT

## 2021-12-07 RX ORDER — M-VIT,TX,IRON,MINS/CALC/FOLIC 27MG-0.4MG
1 TABLET ORAL DAILY
COMMUNITY

## 2021-12-07 NOTE — PATIENT INSTRUCTIONS
· Behavior modification discussed in detail in regards to dietary habits. · Nutritional education occurred during visit. Continue following recommendations  per dietitian. · Improvement in fitness/exercise discussed with patient and the need for this  with/without surgery. · Awaiting CPAP  · Pulmonary Clearance needed prior to surgery - Dr. Ines Farrell  · Psychology evaluation with Dr. Chivo Ortega in process, next apt 1/5/21  · [facility]. · Physical Therapy referral  · EGD prior to any surgical intervention-12/16/21  · Encouraged to attend support groups. · Goal to lose 3% TBW prior to surgery. Currently down 16#  · Seca scale completed and reviewed. · Initial labs completed and reviewed  · A1C 6.9- must remain < 8 prior to surgery  · EKG completed and reviewed. · Advised not to get pregnant for 18-24 months post bariatric surgery as this can increase risk of malnourishment potentially leading to low birth weight or malformation. (hysterectomy)  · Will need to be off work for 3-4 weeks post-bariatric surgery. · No lifting/pushing/pulling over 20# for 4 weeks post-op  · No NSAIDS (Ibuprofen)10 days prior to bariatric surgery. Avoid NSAID use post-op. Stopped Ibuprofen and pain controlled with Tylenol. · Discussed Lovenox post-op bariatric surgery  · LOMN received   · Mammogram completed and reviewed- no concerning findings.

## 2021-12-07 NOTE — PROGRESS NOTES
[facility]  [address]  [place], [address] [facility]  [phone]      Visit Date:  12/7/2021  Weight Management Pre-Op Follow-up    HPI:    Medically Supervised follow-up- Month #3 of 6- desires peggy Guo is here today for continued supervised weight management of morbid obesity. Weight today 265#. Down 3# since last month. Down 16# since starting with weight management. BMI 43. Reports that she has struggled with nutrition over the month. Weight up to 273# after Thanksgiving. Admits that she was eating the wrong food for several days. Took smooth move tea for several days afterward. Reports that she is now back on track with nutrition. Back to tracking. Back to eating the right foods. Reports that she is back to eating 6 times daily. Has been drinking 5 \"smoothies or protein drinks\" and one meal daily. Encouraged to discuss with the dietitian today. Drinking plenty of water. Drinking one cup of coffee daily. Continues chair exercises daily. Active at work- on feet or walking most of the shift. Stopped taking Ibuprofen for knee pain. Has switched over to Tylenol and has been controlling pain. Comorbid conditions include sleep apnea, diabetes mellitus, anxiety, depression, asthma, Gerd, OAB, hyperlipidemia, RLS. Adelina Vu well controlled with Prilosec. Discussed possibility of worsening GERD that may require additional medication or revision surgery. Recent A1C 6.9- must remain < 8 prior to surgery. Checking blood sugars daily and running 120-165. LOMN received. Has not yet completed support groups- encouraged to complete. EKG completed and reviewed. EGD scheduled 12/16/21 at [facility]. Awaiting new CPAP-may be another month. Pulmonary clearance needed prior to surgery- Dr. Dumont Risk apt 1/26/22. Psychological clearance with Dr. Antelmo Vanegas in process, next apt 1/5/21. Mammogram completed and reviewed. No concerning findings.   If she does not lose enough weight with medical management she would like to proceed with sleeve gastrectomy. Physical Activity: walking/ chair exercises    Current BMI: Body mass index is 43.56 kg/m². Current Weight:   Wt Readings from Last 3 Encounters:   21 265 lb 12.8 oz (120.6 kg)   21 268 lb (121.6 kg)   10/20/21 266 lb 12.8 oz (121 kg)     Initial Body ADDVSR:166    Past Medical History:  Past Medical History:   Diagnosis Date    Allergic rhinitis     Anxiety     Asthma     Depression     Diabetes ([facility] [address]     Psychiatric problem     depression    Sleep apnea        Past Surgical History:  Past Surgical History:   Procedure Laterality Date    CARPAL TUNNEL RELEASE       SECTION      HYSTERECTOMY      KNEE ARTHROSCOPY         Past Social History:  Social History     Socioeconomic History    Marital status:      Spouse name: Not on file    Number of children: 3    Years of education: Not on file    Highest education level: Not on file   Occupational History    Not on file   Tobacco Use    Smoking status: Never Smoker    Smokeless tobacco: Never Used   Vaping Use    Vaping Use: Never used   Substance and Sexual Activity    Alcohol use: Yes     Comment: socially    Drug use: No    Sexual activity: Yes   Other Topics Concern    Not on file   Social History Narrative    Not on file     Social Determinants of Health     Financial Resource Strain:     Difficulty of Paying Living Expenses: Not on file   Food Insecurity:     Worried About Running Out of Food in the Last Year: Not on file    Antwan of Food in the Last Year: Not on file   Transportation Needs:     Lack of Transportation (Medical): Not on file    Lack of Transportation (Non-Medical):  Not on file   Physical Activity:     Days of Exercise per Week: Not on file    Minutes of Exercise per Session: Not on file   Stress:     Feeling of Stress : Not on file   Social Connections:     Frequency of Communication with Friends and Family: Not on file    Frequency of Social Gatherings with Friends and Family: Not on file    Attends Christianity Services: Not on file    Active Member of Clubs or Organizations: Not on file    Attends Club or Organization Meetings: Not on file    Marital Status: Not on file   Intimate Partner Violence:     Fear of Current or Ex-Partner: Not on file    Emotionally Abused: Not on file    Physically Abused: Not on file    Sexually Abused: Not on file   Housing Stability:     Unable to Pay for Housing in the Last Year: Not on file    Number of [place] in the Last Year: Not on file    Unstable Housing in the Last Year: Not on file        Medications:   Current Outpatient Medications   Medication Sig Dispense Refill    Multiple Vitamins-Minerals (THERAPEUTIC MULTIVITAMIN-MINERALS) tablet Take 1 tablet by mouth daily      traZODone (DESYREL) 150 MG tablet Take 150 mg by mouth nightly      Dulaglutide (TRULICITY) 2.90 RO/6.9RO SOPN Inject 0.75 mg into the skin once a week      omeprazole (PRILOSEC) 40 MG delayed release capsule Take 1 capsule by mouth daily      oxybutynin (DITROPAN-XL) 5 MG extended release tablet Take 1 tablet by mouth daily       atorvastatin (LIPITOR) 40 MG tablet TAKE ONE TABLET BY MOUTH DAILY 30 tablet 3    metFORMIN (GLUCOPHAGE) 1000 MG tablet TAKE ONE TABLET BY MOUTH TWICE A DAY WITH MEALS (Patient taking differently: See Admin Instructions 500 mg in AM, 1000mg in PM.) 60 tablet 3    hydrOXYzine (ATARAX) 10 MG tablet Take 10 mg by mouth 3 times daily as needed for Itching      Blood Glucose Monitoring Suppl (ACCU-CHEK ROD PLUS) w/Device KIT Check blood sugar once daily 1 kit 0    glucose blood VI test strips (ACCU-CHEK ROD PLUS) strip Check blood sugar once daily 100 each 3    Lancets MISC Use to test blood glucose level 1 time per day.  Diagnosis: E11.9 100 each 1    Cetirizine HCl (ZYRTEC ALLERGY) 10 MG CAPS Take 1 tablet by mouth daily      FLUoxetine (PROZAC) 20 MG capsule Take 40 mg by mouth daily       montelukast (SINGULAIR) 10 MG tablet Take 10 mg by mouth nightly      rOPINIRole (REQUIP) 0.25 MG tablet Take 4 mg by mouth nightly       albuterol sulfate  (90 BASE) MCG/ACT inhaler Inhale 2 puffs into the lungs every 6 hours as needed for Wheezing      CPAP Machine MISC by Does not apply route Please check patient's CPAP machine for proper functioning by [employer]. She was prescribed with a CPAP with a pressure of 11 cm of water with room air. (Patient not taking: Reported on 12/7/2021) 1 each 0     No current facility-administered medications for this visit. Allergies: Allergies   Allergen Reactions    Latex Rash    Keflex [Cephalexin] Rash    Betadine [Povidone Iodine]     Neosporin [Neomycin-Polymyxin-Gramicidin]     Pcn [Penicillins]     Rocephin [Ceftriaxone] Rash       Subjective:    Review of Systems:  Constitutional: Denies any fever, chills, 9+0fatigue. Wound: Denies any rash, skin color changes or wound problems. Resp: Denies any cough, shortness of breath. CV: Denies any chest pain, orthopnea or syncope. MS: Denies myalgias, (+)arthralgias. GI: Denies any nausea, vomiting, diarrhea, constipation, melena, hematochezia. (+) reflux  : Denies any hematuria, hesitancy or dysuria. NEURO: Denies seizures, headache. Objective:    /82 (Site: Right Upper Arm, Position: Sitting, Cuff Size: Large Adult)   Pulse 72   Resp 18   Ht 5' 5.5\" (1.664 m)   Wt 265 lb 12.8 oz (120.6 kg)   BMI 43.56 kg/m²   Physical Examination:   Constitutional: Alert and oriented to person, place and time. Well-developed, well- nourished. Head: Normocephalic and atraumatic  Neck: Supple. Eyes: EOMI b/l. Conjunctivae normal.  No scleral icterus. Respiratory: Effort normal. No respiratory distress. Abd: Benign  Ext:  Movement x 4. No edema  Skin; Warm and dry, no visible rashes, lesions or ulcers.    Neuro: Cranial Nerves Grossly Intact; nml coordination        CBC   Lab Results   Component Value Date    WBC 6.7 10/04/2021    RBC 4.28 10/04/2021    HGB 12.6 10/04/2021    HCT 39.2 10/04/2021    MCV 91.6 10/04/2021    MCH 29.4 10/04/2021    MCHC 32.1 10/04/2021    RDW 15.0 07/27/2017     10/04/2021    MPV 11.3 10/04/2021    RBCMORP NORMAL 07/27/2017    SEGSPCT 67.6 10/04/2021    LABLYMP 20.8 10/04/2021    MONOPCT 7.0 10/04/2021    LABEOS 3.4 10/04/2021    BASO 0.9 10/04/2021    NRBC 0 10/04/2021    ANISOCYTOSIS 1+ 07/27/2017    SEGSABS 4.5 10/04/2021    LYMPHSABS 1.4 10/04/2021    MONOSABS 0.5 10/04/2021    EOSABS 0.2 10/04/2021    BASOSABS 0.1 10/04/2021        BMP/CMP   Lab Results   Component Value Date    GLUCOSE 140 10/04/2021    CREATININE 0.6 10/04/2021    BUN 12 10/04/2021     10/04/2021    K 4.1 10/04/2021    K 4.3 12/05/2020     10/04/2021    CO2 23 10/04/2021    CALCIUM 9.1 10/04/2021    AST 15 10/04/2021    ALKPHOS 68 10/04/2021    PROT 6.9 10/04/2021    LABALBU 4.2 10/04/2021    BILITOT 0.5 10/04/2021    ALT 14 10/04/2021        PREALBUMIN   Lab Results   Component Value Date    PREALBUMIN 21.4 10/04/2021        VITAMIN B12   Lab Results   Component Value Date    DJFCBCHH74 558 10/04/2021        VITAMIN D   Lab Results   Component Value Date    VITD25 40 10/04/2021        PTH  Lab Results   Component Value Date    IPTH 50.3 10/04/2021       VITAMIN B1/ THIAMINE   Lab Results   Component Value Date    ADCO4DXEUHC 130 10/04/2021        LIPID SCREEN (FASTING)   Lab Results   Component Value Date    CHOL 131 10/04/2021    TRIG 97 10/04/2021    HDL 59 10/04/2021    LDLCALC 53 10/04/2021   ,     HGA1C (T2DM ONLY)   Lab Results   Component Value Date    LABA1C 6.9 10/04/2021    AVGG 147 10/04/2021        TSH   Lab Results   Component Value Date    TSH 1.070 10/04/2021        IRON   Lab Results   Component Value Date    IRON 54 10/04/2021        TIBC  Lab Results   Component Value Date    TIBC 269 10/04/2021       FERRITIN  Lab birth weight or malformation. (hysterectomy)  · Will need to be off work for 3-4 weeks post-bariatric surgery. · No lifting/pushing/pulling over 20# for 4 weeks post-op  · No NSAIDS (Ibuprofen)10 days prior to bariatric surgery. Avoid NSAID use post-op. Stopped Ibuprofen and pain controlled with Tylenol. · Discussed Lovenox post-op bariatric surgery  · LOMN received   · Mammogram completed and reviewed- no concerning findings. Return in about 1 month (around 1/7/2022) for Follow up. I spent over 20 minutes with the patient, with greater that 50% of that time spent on education, counseling and coordination of care.      Electronically signed by DANIELITO Rg on 12/7/2021 at 1:41 PM

## 2021-12-07 NOTE — PATIENT INSTRUCTIONS
Goals: 1. Nutrition Goal:  I will continue to work on eating something small within an hour of waking up in an effort to not skip meals- goal is to eat foods at meal times  2. Water Goal:  Great job increasing water intake continue at least ~ 5-6 bottles water a day  3.  Exercise Goal: Continue to do  chair exercises at home at least 15-20 minutes every day  Can call office to schedule next month visit when make rest of program fee payment of $250.- [phone]

## 2022-01-05 ENCOUNTER — OFFICE VISIT (OUTPATIENT)
Dept: PSYCHOLOGY | Age: 47
End: 2022-01-05
Payer: COMMERCIAL

## 2022-01-05 DIAGNOSIS — E66.01 MORBID OBESITY (HCC): ICD-10-CM

## 2022-01-05 DIAGNOSIS — F10.11 HISTORY OF ALCOHOL ABUSE: ICD-10-CM

## 2022-01-05 DIAGNOSIS — Z87.828 HISTORY OF TRAUMA: ICD-10-CM

## 2022-01-05 DIAGNOSIS — F33.41 RECURRENT MAJOR DEPRESSIVE DISORDER, IN PARTIAL REMISSION (HCC): Primary | ICD-10-CM

## 2022-01-05 PROCEDURE — 90832 PSYTX W PT 30 MINUTES: CPT | Performed by: PSYCHOLOGIST

## 2022-01-05 PROCEDURE — 1036F TOBACCO NON-USER: CPT | Performed by: PSYCHOLOGIST

## 2022-01-05 NOTE — PROGRESS NOTES
PSYCHOLOGICAL FOLLOW-UP FOR BARIATRIC SURGERY:      History of Presenting Illness:   Mrs. Rhea Luna was initially referred for a routine psychological evaluation for bariatric surgery on 09/17/2021. At this evaluation, the following requirements were given prior to psychological clearance for WLS:  1) log all food intake  2) eliminate all caffeine use  3) eliminate skipping meals  4) eliminate all alcohol use      Assessment:  She reports she has continued to lose and maintain her weight loss. Mood: She notes mood was lower aroudn the holidays related to not being able to see her grand kids. She reports she has had a couple of depressed days over the past few weeks but not unmanageable. She reports self-talk as a coping strategy. Eating behaviors: She denies any fast food recently. She reports she is consuming liquid for 2 meals per day, and will eat 1 real meal per day. She reports using Slim Fast, and Atkins shakes for the most part. Denies skipping meals. Carbonation: she reports very rare carbonation use. Caffeine: she reports consuming 12 ounces of coffee per day. She reports she is logging her food intake in [employer] and was able to provide proof of logging to writer during session today. She reports she has been insta-carting over the past couple of weeks for money. Supports: her  and mother (not mother-in-law) as her primary supports throughout the [facility] process. Psychiatric: Prozac 40mg QD; Desyrel 150mg QHS; Atarax 10mg TID PRN. Does use CBD, not marijuana. Discussed the importance of staying on current psychiatric medications for at least 12 months s/p surgery in order to optimize weight loss. Patient is in agreement. She reports she does not plan on changing her current medication regimen. Alcohol use: reports 1 straberry daiquiri on New Year's Elena, and on 12/28/2021 had 3 shots for dad's birthday.  Understands lifetime abstinence from all substance use is expected. Counselor: started to see Rafael Krishnamurthy through a company called \"[facility]\" in [facility]. She reports she has seen [facility] twice and plans to continue. Discussed possible fluctations in mood post-surgery and the importance of following up with her counselor. Suggested time frames to follow up with her counselor s/p surgery were at 1 week, 2 weeks, and 1 month. Patient is in agreement with plan. Numerical Literacy: 4/4; patient able to complete basic math skills necessary for independent medication changes. Psychological Interpretation of MMPI-2: Patient's MMPI-2 appears to be valid; therefore, clinical scales can be interpreted. Patient's clinical profile is considered a spike 4/2. Individuals with similar clinical profiles of 4/2 are often described as either having a chronic struggle or having experienced a situational setback, and are feeling angry, trapped or depressed. They are generally feeling frustrated with their current situation which is often related to their self-defeating behaviors. They may turn their anger inward which may cause them to feel unworthy and depressed. At times they may turn their anger outward which may lead to acting out and outbursts. PSYCHIATRIC EXAM  General appearance: casually dressed, groomed appropriately, obese, mask. Attitude & Behavior: Cooperative and guarded at times. Motor Activity & Musculoskeletal: no abnormal movements. Speech & Language: normal rate, volume, and prosody. Gait & Station: Staggered gait when transferring from sit to stand (due to knee). Mood: Depressed. Affect: Congruent with mood. Thought content: Appropriate. Suicidal ideation: Denied. Homicidal ideation: Denied. Thought process & Associations: Logical.   Perceptions: Appropriate. Orientation: Alert and Oriented X 6.   Attention & Concentration: Normal.   Recent & Remote Memory: normal, 4/5 words recalled after 5-minute delay.   Executive function: Intact. Visual spatial: Intact. Fund of knowledge: Good. Insight: Good. Judgment: average.        DSM DIAGNOSIS:  Major depressive disorder, in partial remission  History of alcohol abuse   History of trauma   Morbid Obesity    PLAN:  Lyndsay Neff has completed minimum requirements and is now cleared from a psychological/substance perspective for bariatric surgery.       Psychology Clinician:  Elva Jo, PhD     Start time:9:35am  End time: 10:03am      Electronically signed by Desmond Rich PhD on 1/5/22 at 9:36 AM EST

## 2022-01-25 ENCOUNTER — TELEPHONE (OUTPATIENT)
Dept: PULMONOLOGY | Age: 47
End: 2022-01-25

## 2022-01-25 NOTE — TELEPHONE ENCOUNTER
Patient hit the reschedule button through 360 the automated service. Patient was scheduled for an appointment on 1/26/22. I called and left to callback to get rescheduled.

## 2022-02-07 ENCOUNTER — TELEPHONE (OUTPATIENT)
Dept: BARIATRICS/WEIGHT MGMT | Age: 47
End: 2022-02-07

## 2022-02-07 NOTE — TELEPHONE ENCOUNTER
LVM- can call us to re-schedule month 3 appt with Octavio Mckeon and [facility]. Program fee balance is $250 to be paid before month 6- pt voices understanding.

## 2022-02-18 ENCOUNTER — OFFICE VISIT (OUTPATIENT)
Dept: BARIATRICS/WEIGHT MGMT | Age: 47
End: 2022-02-18

## 2022-02-18 VITALS — HEIGHT: 66 IN | WEIGHT: 271 LBS | BODY MASS INDEX: 43.55 KG/M2

## 2022-02-18 DIAGNOSIS — E66.01 MORBID OBESITY WITH BMI OF 40.0-44.9, ADULT (HCC): Primary | ICD-10-CM

## 2022-02-18 NOTE — PROGRESS NOTES
Assessment: Patient is a 51 y.o. female seen for month four  follow up MNT visit for  pre op bariatric surgery desires sleeve  Last seen in December    Vitals from current and previous visits:  Vitals 8/62/2833   SYSTOLIC    DIASTOLIC    Site    Position    Cuff Size    Pulse    Temp    Resp    SpO2    Weight 271 lb   Height 5' 6\"   Body mass index 43.74 kg/m2   Pain Level    Waist (Inches)    Neck circumference (Inches)          Initial weight at start of Weight Management Program was: 281 lbs     Gale [place] gained 6 lbs from last visit in December  -Weight goal: lose weight.     -Nutritionally relevant labs: Initial labs-  HA1C today was 6.9%, Vitamin D-40, Iron levels WNL. B1-130  Lab Results   Component Value Date/Time    LABA1C 6.9 (H) 10/04/2021 11:07 AM    LABA1C 6.5 (H) 11/16/2017 11:04 AM    LABA1C 8.6 08/10/2017 04:24 PM    GLUCOSE 140 (H) 10/04/2021 11:06 AM    GLUCOSE 184 (H) 12/05/2020 01:20 PM    CHOL 131 10/04/2021 11:06 AM    HDL 59 10/04/2021 11:06 AM    LDLCALC 53 10/04/2021 11:06 AM    TRIG 97 10/04/2021 11:06 AM     Dr Polly Jennings Clearance obtained   DARIEN consult  completed- Pt has a new CPAP now- states needs to call Pulmonary and get a follow up appt  Pt had EGD done    - Is patient taking daily Multivitamin:  Does take a MVI-  taking Women's gummies MVI two per day    Works 7:30a-2pm or 2p-9p at [employer] five-six days a week. 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   - to track food intake  New lactose intolerant per pt \"I just think that I am\"- notices with drinking milk and Slim fast shakes so therefore stopped drinking them  Packs food items for work   Breakfast: may have a couple scrambled eggs in morning  Lunch: B[employer] lunch on the go with tuna and chicken salad and crackers  Dinner: ~spouse cooks dinner- may have chicken or sweet and sour chicken with brown rice and vegetable. Last nite had cambells chunky soup  Snacks: denies much snacking between meals  Main Beverages:  drinking tea\"Smooth Move\" 8 oz with splenda in evening, up to 5-6  bottles water a day and more at work, ~  1 cup coffee /day with artificial sweetener (splenda) and powder creamer in morning, cut all diet pop      -Impression of Dietary Intake: aiming for regular meal times and less reliance on protein shakes  Pt  is working towards avoiding high fat/high sugar foods. Pt  is working towards  including protein at meals and snacks. Exercise:    -Physical Activity is:    Pt doing Chair Exercises when gets off work  As able to. Does do increasing walking with work. Provided patient with exercise log to record daily activity and return to office each month. Recommendation given to record activity at least 24 days of the month. This will be submitted to insurance prior to approval for bariatric surgery. Nutrition Diagnosis: Overweight/Obesity related to currently undergoing MNT as evidenced by BMI of 43.7  Intervention:  Patient has attended support groups via [employer] two. Monthly goals reviewed with pt. Patient Instructions   Goals:  1. Nutrition Goal:  Continue to eat something small within an hour of waking up in an effort to not skip meals-   Aim for regular meal times as much as possible  2. Water Goal:  Continue at least ~ 5-6 bottles water a day  3. Exercise Goal: Continue to do  chair exercises at home at least 15-20 minutes every day. Bring exercise log back to next office visit. 4. Call pulmonary office to get your follow up visit scheduled.          -Followup visit:4 weeks with JIMENEZ Merida, JIMENEZ, LD,   Dietitian- Weight Management [address] [place] [address]

## 2022-02-18 NOTE — PATIENT INSTRUCTIONS
Goals: 1. Nutrition Goal:  Continue to eat something small within an hour of waking up in an effort to not skip meals-   Aim for regular meal times as much as possible  2. Water Goal:  Continue at least ~ 5-6 bottles water a day  3. Exercise Goal: Continue to do  chair exercises at home at least 15-20 minutes every day. Bring exercise log back to next office visit. 4. Call pulmonary office to get your follow up visit scheduled.

## 2022-02-21 ENCOUNTER — OFFICE VISIT (OUTPATIENT)
Dept: BARIATRICS/WEIGHT MGMT | Age: 47
End: 2022-02-21
Payer: COMMERCIAL

## 2022-02-21 VITALS
WEIGHT: 269.6 LBS | HEIGHT: 66 IN | TEMPERATURE: 97.5 F | RESPIRATION RATE: 18 BRPM | SYSTOLIC BLOOD PRESSURE: 112 MMHG | BODY MASS INDEX: 43.33 KG/M2 | DIASTOLIC BLOOD PRESSURE: 74 MMHG | HEART RATE: 104 BPM

## 2022-02-21 DIAGNOSIS — F41.9 ANXIETY: ICD-10-CM

## 2022-02-21 DIAGNOSIS — K21.9 GASTROESOPHAGEAL REFLUX DISEASE WITHOUT ESOPHAGITIS: ICD-10-CM

## 2022-02-21 DIAGNOSIS — J45.909 UNCOMPLICATED ASTHMA, UNSPECIFIED ASTHMA SEVERITY, UNSPECIFIED WHETHER PERSISTENT: ICD-10-CM

## 2022-02-21 DIAGNOSIS — G25.81 RLS (RESTLESS LEGS SYNDROME): ICD-10-CM

## 2022-02-21 DIAGNOSIS — N32.81 OAB (OVERACTIVE BLADDER): ICD-10-CM

## 2022-02-21 DIAGNOSIS — E11.9 TYPE 2 DIABETES MELLITUS WITHOUT COMPLICATION, WITHOUT LONG-TERM CURRENT USE OF INSULIN (HCC): ICD-10-CM

## 2022-02-21 DIAGNOSIS — E78.5 HYPERLIPIDEMIA, UNSPECIFIED HYPERLIPIDEMIA TYPE: ICD-10-CM

## 2022-02-21 DIAGNOSIS — G47.33 OBSTRUCTIVE SLEEP APNEA: ICD-10-CM

## 2022-02-21 DIAGNOSIS — F32.A DEPRESSION, UNSPECIFIED DEPRESSION TYPE: ICD-10-CM

## 2022-02-21 PROCEDURE — 99213 OFFICE O/P EST LOW 20 MIN: CPT | Performed by: PHYSICIAN ASSISTANT

## 2022-02-21 PROCEDURE — 1036F TOBACCO NON-USER: CPT | Performed by: PHYSICIAN ASSISTANT

## 2022-02-21 PROCEDURE — 3046F HEMOGLOBIN A1C LEVEL >9.0%: CPT | Performed by: PHYSICIAN ASSISTANT

## 2022-02-21 PROCEDURE — G8427 DOCREV CUR MEDS BY ELIG CLIN: HCPCS | Performed by: PHYSICIAN ASSISTANT

## 2022-02-21 PROCEDURE — G8417 CALC BMI ABV UP PARAM F/U: HCPCS | Performed by: PHYSICIAN ASSISTANT

## 2022-02-21 PROCEDURE — 2022F DILAT RTA XM EVC RTNOPTHY: CPT | Performed by: PHYSICIAN ASSISTANT

## 2022-02-21 PROCEDURE — G8484 FLU IMMUNIZE NO ADMIN: HCPCS | Performed by: PHYSICIAN ASSISTANT

## 2022-02-21 NOTE — PATIENT INSTRUCTIONS
· Behavior modification discussed in detail in regards to dietary habits. · Nutritional education occurred during visit. Continue following recommendations  per dietitian. · Improvement in fitness/exercise discussed with patient and the need for this  with/without surgery. · Continue CPAP  · Pulmonary Clearance needed prior to surgery - Dr. Patel Better  · Psychology evaluation with Dr. Angie Bernardo completed and reviewed. · Follow up with counselor in [place]) as scheduled. · EGD completed and reviewed. H. Pylori (-)  · Encouraged to attend support groups. · Goal to lose 3% TBW prior to surgery. Currently down 12#  · Seca scale completed and reviewed. · Initial labs completed and reviewed  · A1C 6.9- must remain < 8 prior to surgery  · EKG completed and reviewed. · Advised not to get pregnant for 18-24 months post bariatric surgery as this can increase risk of malnourishment potentially leading to low birth weight or malformation. (hysterectomy)  · Will need to be off work for 3-4 weeks post-bariatric surgery. · No lifting/pushing/pulling over 20# for 4 weeks post-op  · No NSAIDS (Ibuprofen)10 days prior to bariatric surgery. Avoid NSAID use post-op. Off NSAIDS. Continues Tylenol and Voltaren gel as needed. · Discussed Lovenox post-op bariatric surgery  · LOMN received   · Mammogram completed and reviewed- no concerning findings.    · Follow up with neuropsychiatry in [place] as scheduled 2/24/22

## 2022-02-21 NOTE — PROGRESS NOTES
[address] [facility]  [address]  SANAMAIRANI RUFFEFREN STEWART.GUILHERME, [address] [facility]  [phone]      Visit Date:  2/21/2022  Weight Management Pre-Op Follow-up    HPI:    Medically Supervised follow-up- Month #4 of 6- jeramie Amato is here today for continued supervised weight management of morbid obesity. Weight today 269#. Up 4# since last month. Down 12# since starting with weight management. BMI 43. Admits that nutrition has been up and down for the last month. Has had a hard time getting back on track with nutrition since the holidays. Has also been struggling financially. Planning to change jobs. Currently with [employer] but has been working for [employer] and making almost double. Her 15year old son also recently diagnosed with Autism. Has recently met with the dietitian and has been trying to make suggested changes. Plans to start [employer] protein shakes as she believes that she is lactose intolerant. Has been eating mostly tuna salad/chicken salad packets. Has limited groceries currently as she is awaiting additional food stamps on the 2/24/22. Has also been trying to go to food pantries when able. Plans to try increasing vegetables over the next month. Continues to be consistent with drinking plenty of water/ sugar free drinks. Drinking one cup of coffee daily. No pop. Has been walking more with [employer]/door dash. Continues chair exercises daily at home for 25 minutes. Recently had cortisone injection to left knee which has helped. Continues tylenol/ Voltaren gel and brace as needed. Not taking NSAIDS. Comorbid conditions include sleep apnea, diabetes mellitus, anxiety, depression, asthma, Gerd, OAB, hyperlipidemia, RLS. Sandria Fuss well controlled with Prilosec. Discussed possibility of worsening GERD that may require additional medication or revision surgery. Recent A1C 6.9- must remain < 8 prior to surgery.  Checking blood sugars 1 times daily and running 115-125. LOMN received. Has completed online support groups on Youtube. Completed initial assessment with Neuropsychiatrist in [place] for memory loss- completed testing. Has a follow up scheduled 22. EKG completed and reviewed. EGD completed and reviewed. H. Pylori (-). Received CPAP over a month ago- tolerating well. Pulmonary clearance needed prior to surgery- Dr. Angélica Huertas to 3/2/22. Psychological clearance with Dr. Aishwarya Jaime completed and reviewed. Mammogram completed and reviewed. No concerning findings. If she does not lose enough weight with medical management she would like to proceed with sleeve gastrectomy. Physical Activity: walking/ chair exercises    Current BMI: Body mass index is 43.51 kg/m².   Current Weight:   Wt Readings from Last 3 Encounters:   22 269 lb 9.6 oz (122.3 kg)   22 271 lb (122.9 kg)   21 265 lb 12.8 oz (120.6 kg)     Initial Body GYIRFB:600    Past Medical History:  Past Medical History:   Diagnosis Date    Allergic rhinitis     Anxiety     Asthma     Depression     Diabetes ([facility] [address]     Psychiatric problem     depression    Sleep apnea        Past Surgical History:  Past Surgical History:   Procedure Laterality Date    CARPAL TUNNEL RELEASE       SECTION      HYSTERECTOMY      KNEE ARTHROSCOPY         Past Social History:  Social History     Socioeconomic History    Marital status:      Spouse name: Not on file    Number of children: 3    Years of education: Not on file    Highest education level: Not on file   Occupational History    Not on file   Tobacco Use    Smoking status: Never Smoker    Smokeless tobacco: Never Used   Vaping Use    Vaping Use: Never used   Substance and Sexual Activity    Alcohol use: Yes     Comment: socially    Drug use: No    Sexual activity: Yes   Other Topics Concern    Not on file   Social History Narrative    Not on file     Social Determinants of Health Financial Resource Strain:     Difficulty of Paying Living Expenses: Not on file   Food Insecurity:     Worried About Running Out of Food in the Last Year: Not on file    Antwan of Food in the Last Year: Not on file   Transportation Needs:     Lack of Transportation (Medical): Not on file    Lack of Transportation (Non-Medical): Not on file   Physical Activity:     Days of Exercise per Week: Not on file    Minutes of Exercise per Session: Not on file   Stress:     Feeling of Stress : Not on file   Social Connections:     Frequency of Communication with Friends and Family: Not on file    Frequency of Social Gatherings with Friends and Family: Not on file    Attends Taoist Services: Not on file    Active Member of [address] [place] [address] [employer] or Organizations: Not on file    Attends Club or Organization Meetings: Not on file    Marital Status: Not on file   Intimate Partner Violence:     Fear of Current or Ex-Partner: Not on file    Emotionally Abused: Not on file    Physically Abused: Not on file    Sexually Abused: Not on file   Housing Stability:     Unable to Pay for Housing in the Last Year: Not on file    Number of [place] in the Last Year: Not on file    Unstable Housing in the Last Year: Not on file        Medications:   Current Outpatient Medications   Medication Sig Dispense Refill    Multiple Vitamins-Minerals (THERAPEUTIC MULTIVITAMIN-MINERALS) tablet Take 1 tablet by mouth daily      CPAP Machine MISC by Does not apply route Please check patient's CPAP machine for proper functioning by [employer]. She was prescribed with a CPAP with a pressure of 11 cm of water with room air.  1 each 0    traZODone (DESYREL) 150 MG tablet Take 150 mg by mouth nightly      Dulaglutide (TRULICITY) 4.36 UL/3.1DG SOPN Inject 0.75 mg into the skin once a week      omeprazole (PRILOSEC) 40 MG delayed release capsule Take 1 capsule by mouth daily      oxybutynin (DITROPAN-XL) 5 MG extended release tablet Take 1 tablet by mouth daily       atorvastatin (LIPITOR) 40 MG tablet TAKE ONE TABLET BY MOUTH DAILY 30 tablet 3    metFORMIN (GLUCOPHAGE) 1000 MG tablet TAKE ONE TABLET BY MOUTH TWICE A DAY WITH MEALS (Patient taking differently: See Admin Instructions 500 mg in AM, 1000mg in PM.) 60 tablet 3    hydrOXYzine (ATARAX) 10 MG tablet Take 10 mg by mouth 3 times daily as needed for Itching      Blood Glucose Monitoring Suppl (ACCU-CHEK ROD PLUS) w/Device KIT Check blood sugar once daily 1 kit 0    glucose blood VI test strips (ACCU-CHEK ROD PLUS) strip Check blood sugar once daily 100 each 3    Lancets MISC Use to test blood glucose level 1 time per day. Diagnosis: E11.9 100 each 1    Cetirizine HCl (ZYRTEC ALLERGY) 10 MG CAPS Take 1 tablet by mouth daily      FLUoxetine (PROZAC) 20 MG capsule Take 40 mg by mouth daily       montelukast (SINGULAIR) 10 MG tablet Take 10 mg by mouth nightly      rOPINIRole (REQUIP) 0.25 MG tablet Take 4 mg by mouth nightly       albuterol sulfate  (90 BASE) MCG/ACT inhaler Inhale 2 puffs into the lungs every 6 hours as needed for Wheezing       No current facility-administered medications for this visit. Allergies: Allergies   Allergen Reactions    Latex Rash    Keflex [Cephalexin] Rash    Betadine [Povidone Iodine]     Neosporin [Neomycin-Polymyxin-Gramicidin]     Pcn [Penicillins]     Rocephin [Ceftriaxone] Rash       Subjective:    Review of Systems:  Constitutional: Denies any fever, chills, 9+0fatigue. Wound: Denies any rash, skin color changes or wound problems. Resp: Denies any cough, shortness of breath. CV: Denies any chest pain, orthopnea or syncope. MS: Denies myalgias, (+)arthralgias. GI: Denies any nausea, vomiting, diarrhea, constipation, melena, hematochezia. (+) reflux  : Denies any hematuria, hesitancy or dysuria. NEURO: Denies seizures, headache.       Objective:    /74 (Site: Left Upper Arm, Position: Sitting, Cuff Size: Large Adult)   Pulse 104   Temp 97.5 °F (36.4 °C) (Infrared)   Resp 18   Ht 5' 6\" (1.676 m)   Wt 269 lb 9.6 oz (122.3 kg)   BMI 43.51 kg/m²   Physical Examination:   Constitutional: Alert and oriented to person, place and time. Well-developed, well- nourished. Head: Normocephalic and atraumatic  Neck: Supple. Eyes: EOMI b/l. Conjunctivae normal.  No scleral icterus. Respiratory: Effort normal. No respiratory distress. Abd: Benign  Ext:  Movement x 4. No edema  Skin; Warm and dry, no visible rashes, lesions or ulcers.    Neuro: Cranial Nerves Grossly Intact; nml coordination        CBC   Lab Results   Component Value Date    WBC 6.7 10/04/2021    RBC 4.28 10/04/2021    HGB 12.6 10/04/2021    HCT 39.2 10/04/2021    MCV 91.6 10/04/2021    MCH 29.4 10/04/2021    MCHC 32.1 10/04/2021    RDW 15.0 07/27/2017     10/04/2021    MPV 11.3 10/04/2021    RBCMORP NORMAL 07/27/2017    SEGSPCT 67.6 10/04/2021    LABLYMP 20.8 10/04/2021    MONOPCT 7.0 10/04/2021    LABEOS 3.4 10/04/2021    BASO 0.9 10/04/2021    NRBC 0 10/04/2021    ANISOCYTOSIS 1+ 07/27/2017    SEGSABS 4.5 10/04/2021    LYMPHSABS 1.4 10/04/2021    MONOSABS 0.5 10/04/2021    EOSABS 0.2 10/04/2021    BASOSABS 0.1 10/04/2021        BMP/CMP   Lab Results   Component Value Date    GLUCOSE 140 10/04/2021    CREATININE 0.6 10/04/2021    BUN 12 10/04/2021     10/04/2021    K 4.1 10/04/2021    K 4.3 12/05/2020     10/04/2021    CO2 23 10/04/2021    CALCIUM 9.1 10/04/2021    AST 15 10/04/2021    ALKPHOS 68 10/04/2021    PROT 6.9 10/04/2021    LABALBU 4.2 10/04/2021    BILITOT 0.5 10/04/2021    ALT 14 10/04/2021        PREALBUMIN   Lab Results   Component Value Date    PREALBUMIN 21.4 10/04/2021        VITAMIN B12   Lab Results   Component Value Date    OVVUVGUK56 550 10/04/2021        VITAMIN D   Lab Results   Component Value Date    VITD25 40 10/04/2021        PTH  Lab Results   Component Value Date    IPTH 50.3 10/04/2021       VITAMIN B1/ THIAMINE Lab Results   Component Value Date    [id number] 130 10/04/2021        LIPID SCREEN (FASTING)   Lab Results   Component Value Date    CHOL 131 10/04/2021    TRIG 97 10/04/2021    HDL 59 10/04/2021    LDLCALC 53 10/04/2021   ,     HGA1C (T2DM ONLY)   Lab Results   Component Value Date    LABA1C 6.9 10/04/2021    AVGG 147 10/04/2021        TSH   Lab Results   Component Value Date    TSH 1.070 10/04/2021        IRON   Lab Results   Component Value Date    IRON 54 10/04/2021        TIBC  Lab Results   Component Value Date    TIBC 269 10/04/2021       FERRITIN  Lab Results   Component Value Date    FERRITIN 68 10/04/2021       VITAMIN A  Lab Results   Component Value Date    RETINOL SEE BELOW 10/04/2021       NICOTINE  No results found for: NMET    UDS  Lab Results   Component Value Date    UDP SEE BELOW 10/04/2021       PSA  No results found for: LABPSA    GFR  Lab Results   Component Value Date    LABGLOM >90 10/04/2021       DEXA  No results found for this or any previous visit. Assessment:       Diagnosis Orders   1. BMI 40.0-44.9, adult ([facility] [address]     2. Type 2 diabetes mellitus without complication, without long-term current use of insulin ([facility] [address]     3. Anxiety     4. Depression, unspecified depression type     5. Uncomplicated asthma, unspecified asthma severity, unspecified whether persistent     6. RLS (restless legs syndrome)     7. Obstructive sleep apnea     8. Gastroesophageal reflux disease without esophagitis     9. OAB (overactive bladder)     10. Hyperlipidemia, unspecified hyperlipidemia type         Plan:    · Behavior modification discussed in detail in regards to dietary habits. · Nutritional education occurred during visit. Continue following recommendations  per dietitian. · Improvement in fitness/exercise discussed with patient and the need for this  with/without surgery.   · Continue CPAP  · Pulmonary Clearance needed prior to surgery - Dr. Liza Santiago  · Psychology evaluation with  [facility] completed and reviewed. · Follow up with counselor in [facility] Danielle Lizzette) as scheduled. · EGD completed and reviewed. H. Pylori (-)  · Encouraged to attend support groups. · Goal to lose 3% TBW prior to surgery. Currently down 12#  · Seca scale completed and reviewed. · Initial labs completed and reviewed  · A1C 6.9- must remain < 8 prior to surgery  · EKG completed and reviewed. · Advised not to get pregnant for 18-24 months post bariatric surgery as this can increase risk of malnourishment potentially leading to low birth weight or malformation. (hysterectomy)  · Will need to be off work for 3-4 weeks post-bariatric surgery. · No lifting/pushing/pulling over 20# for 4 weeks post-op  · No NSAIDS (Ibuprofen)10 days prior to bariatric surgery. Avoid NSAID use post-op. Off NSAIDS. Continues Tylenol and Voltaren gel as needed. · Discussed Lovenox post-op bariatric surgery  · LOMN received   · Mammogram completed and reviewed- no concerning findings. · Follow up with neuropsychiatry in [facility] as scheduled 2/24/22  Return in about 1 month (around 3/21/2022) for Follow up. I spent over 20 minutes with the patient, with greater that 50% of that time spent on education, counseling and coordination of care.      Electronically signed by DANIELITO Mahajan on 2/21/2022 at 4:06 PM

## 2022-02-26 NOTE — PROGRESS NOTES
[place] [facility] [address] Follow up note    Kiko Lerma       Chief Complaint: Carlos Colón is a 3 month f/u with download                                           Chief complaint and [place]: Kiko Lerma is a 51 y. o.oldfemale came for follow up regarding her sleep apnea. She is currently using her positive airway pressure device with a CPAP pressure of 11cm H20. She denies any problems with machine or mask. She is currently taking trazodone 150 mg p.o. nightly. She gets her trazodone prescription from her family physician. She is also taking Requip at 4 mg p.o. nightly. She is sleeping well at night with out difficulty. She denies any daytime sleepiness. She was evaluated by a psychiatrist yet [address] in the last 1 month. She is also suffering with nightmares. She was diagnosed with PTSD. She is currently waiting for treatment. She used to take over-the-counter melatonin for her insomnia in the past.  She did not have any good response with melatonin in the past.  She quit taking melatonin. Review of Systems:   General/Constitutional: she gained 7lbs of weight from the last visit with normal appetite. No fever or chills. HENT: Negative. Eyes: Negative. Upper respiratory tract: No nasal stuffiness or post nasal drip. Lower respiratory tract/ lungs: No cough or sputum production. No hemoptysis. Cardiovascular: No palpitations or chest pain. Gastrointestinal: No nausea or vomiting. Neurological: No focal neurologiacal weakness. Extremities: No edema. Musculoskeletal: No complaints. Genitourinary: No complaints. Hematological: Negative. Psychiatric/Behavioral: Negative. Skin: No itching.         Past Medical History:   Diagnosis Date    Allergic rhinitis     Anxiety     Asthma     Depression     Diabetes ([facility] [address]     Psychiatric problem     depression    Sleep apnea        Past Surgical History:   Procedure Laterality Date    CARPAL TUNNEL RELEASE       SECTION      HYSTERECTOMY      KNEE ARTHROSCOPY         Social History     Tobacco Use    Smoking status: Never Smoker    Smokeless tobacco: Never Used   Vaping Use    Vaping Use: Never used   Substance Use Topics    Alcohol use: Yes     Comment: socially    Drug use: No       Allergies   Allergen Reactions    Latex Rash    Keflex [Cephalexin] Rash    Betadine [Povidone Iodine]     Neosporin [Neomycin-Polymyxin-Gramicidin]     Pcn [Penicillins]     Rocephin [Ceftriaxone] Rash       Current Outpatient Medications   Medication Sig Dispense Refill    Multiple Vitamins-Minerals (THERAPEUTIC MULTIVITAMIN-MINERALS) tablet Take 1 tablet by mouth daily      CPAP Machine MISC by Does not apply route Please check patient's CPAP machine for proper functioning by [employer]. She was prescribed with a CPAP with a pressure of 11 cm of water with room air. 1 each 0    traZODone (DESYREL) 150 MG tablet Take 150 mg by mouth nightly      Dulaglutide (TRULICITY) 1.78 OB/7.6BB SOPN Inject 0.75 mg into the skin once a week      omeprazole (PRILOSEC) 40 MG delayed release capsule Take 1 capsule by mouth daily      oxybutynin (DITROPAN-XL) 5 MG extended release tablet Take 1 tablet by mouth daily       atorvastatin (LIPITOR) 40 MG tablet TAKE ONE TABLET BY MOUTH DAILY 30 tablet 3    metFORMIN (GLUCOPHAGE) 1000 MG tablet TAKE ONE TABLET BY MOUTH TWICE A DAY WITH MEALS (Patient taking differently: See Admin Instructions 500 mg in AM, 1000mg in PM.) 60 tablet 3    hydrOXYzine (ATARAX) 10 MG tablet Take 10 mg by mouth 3 times daily as needed for Itching      Blood Glucose Monitoring Suppl (ACCU-CHEK ROD PLUS) w/Device KIT Check blood sugar once daily 1 kit 0    glucose blood VI test strips (ACCU-CHEK ROD PLUS) strip Check blood sugar once daily 100 each 3    Lancets MISC Use to test blood glucose level 1 time per day.  Diagnosis: E11.9 100 each 1    Cetirizine HCl (ZYRTEC ALLERGY) 10 MG CAPS Take 1 tablet by mouth daily      FLUoxetine (PROZAC) 20 MG capsule Take 40 mg by mouth daily       montelukast (SINGULAIR) 10 MG tablet Take 10 mg by mouth nightly      rOPINIRole (REQUIP) 0.25 MG tablet Take 4 mg by mouth nightly       albuterol sulfate  (90 BASE) MCG/ACT inhaler Inhale 2 puffs into the lungs every 6 hours as needed for Wheezing       No current facility-administered medications for this visit. Family History   Problem Relation Age of Onset   Aaron Thyroid Disease Mother     Depression Mother     Asthma Mother     Arthritis Mother     Cancer Father     Arthritis Father     Diabetes Sister     Arthritis Sister     Cancer Maternal Uncle     Arthritis Paternal Grandfather     Cancer Paternal Grandfather     Arthritis Paternal Grandmother     Arthritis Maternal Grandfather     Arthritis Maternal Grandmother     Asthma Maternal Grandmother           /64 (Site: Left Lower Arm, Position: Sitting, Cuff Size: Medium Adult)   Pulse 79   Temp 97.6 °F (36.4 °C)   Ht 5' 4.5\" (1.638 m)   Wt 273 lb 3.2 oz (123.9 kg)   SpO2 97% Comment: room air at rest  BMI 46.17 kg/m²   BMI:  Body mass index is 46.17 kg/m². Mallampati airway Class:3  Neck Circumference:.16 Inches  [place] sleepiness score 3/2/22: 3  Sleep apnea quality of life questionnaire:.50    Physical Exam :  Constitutional: Patient appears moderately built and moderately nourished. No distress. Patient is oriented to person, place, and time. HENT:   Head: Normocephalic and atraumatic. Right Ear: External ear normal.   Left Ear: External ear normal.   Mouth/Throat: Oropharynx is clear and moist.   Eyes: Conjunctivae are normal. Pupils are equal and reactive to light. No scleral icterus. Neck: Neck supple. No JVD present. Cardiovascular: Normal rate, regular rhythm, normal heart sounds. No murmur heard.    Pulmonary/Chest: Effort normal and breath sounds normal. No stridor. No respiratory distress. No wheezes. No rales. Abdominal: Soft. Patient exhibits no distension. No tenderness. Musculoskeletal: Normal range of motion. Extremities:Patient exhibits no edema and no tenderness. Lymphadenopathy:  No cervical adenopathy. Neurological: Patient is alert and oriented to person, place, and time. Skin: Skin is warm and dry. Patient is not diaphoretic. Psychiatric: Patient  has a normal mood and affect. Diagnostic Data:    Ferritin level: Performed on 4 October 2021: 68 ng/mL        Sleep test:  She underwent a baseline sleep study at [facility] sleep lab on 20 October 2016. Total number of respiratory events were 217. AHI: 45.1  Maximal oxygen desaturation recorded: 73%  Patient spent a total of 4.66% of night below oxygen saturation less than 88%                CPAP test:  She underwent her CPAP titration study on 4 November 2016 at [facility] sleep lab. She was titrated to a CPAP pressure of 11 cm of water with room air. Her weight on the day of titration study was 285 pounds    Diagnostic Data: Psg 10/20/16 Ahi 45.1  PAP Download:   Recorded compliance dates:1/29/22-2/27/22  More than 4hour usage compliance was:70%. Average residual Apnea- Hypoapnea index on current pressue was:1.5.       PAP Type cpap   Level  11      Average usuage hours per day was:.[id number]               Interface: full  Provider:  [x]? [facility]             []?[facility]                        []?[facility]          []? [facility]                           []?[facility]      []? Other:     Ferritin level sent on 4 October 2021: 68 ng/mL      Assesment:  -Severe obstructive sleep apnea on treatment with a CPAP pressure of 11cm H20 - she is using her CPAP device with excellent compliance to >4 hours of therapy. Her respiratory events were under good control with current therapy. Her clinical symptoms improved.  She is benefiting from current CPAP therapy and would like to continue the therapy.  -Restless leg syndrome on treatment with Requip 4 mg p.o. nightly. She is currently following with her family physician. She was found to have a acceptable level of serum ferritin level. -Hypersomnia ( Excessive daytime sleepiness) may be due to untreated obstructive sleep apnea- improved. -Depression currently on treatment medications under moderate control  -Allergic rhinitis with history of hayfever. She is currently on treatment with Singulair 10 mg p.o.nightly. She is also taking over-the-counter antihistaminics including Zyrtec/Claritin. -GERD on treatment with PPI  -Anxiety disorder on treatment medications.  -Insomnia with sleep maintenance. She is currently on treatment with the trazodone 150 mg p.o. nightly. She is also taking 20 mg of her hydroxyzine at nighttime. She is also taking 1 tablet of hydroxyzine in the morning. Recommendations/Plan:  -Start patient on Melatonin 3mg PO daily at bed time PRN. She was instructed to not to drive any motor vehicles or operate heavy equipment after taking the pill until sleep symptoms are under control. She was detailed about mechanism of action of drug along with associated side effects. She agreed to take the risk and medication. She verbalizes understanding.  -She was advised to decrease her dose of Hydroxyzine 10 Mg P.O. Nightly to Improve Her Daytime Sleepiness.  -She was advised to make an appointment with her psychiatrist at [facility] for further evaluation and optimization of her antidepressant medications. She should be considered for discontinuing Prozac due to worsening of her restless leg syndrome at nighttime and consider alternative treatment for her depression if needed.   She is also currently waiting for PTSD treatment by her psychiatrist at the [facility] along with nightmares.  -She was advised to continue current positive airway pressure therapy with above described pressure.  -She was advised to keep good compliance with current recommended pressure to get optimal results and clinical improvement.  -Follow with my clinic in 6months for clinical reevaluation with review of download. -She was advised to call [employer] company regarding supplies if needed.  -She was advised to call my office for earlier appointment if needed for worsening of sleep symptoms.  -Shewas advised to loose weight by controlling diet and doing exercise. She is currently following with [facility] weight management center with Dr. Fam Alcantara  -She was instructed to not to drive any motor vehicles or operate heavy equipment if she feels sleepy. -Jasper Garrido was educated about my impression and plan. Patient verbalizes understanding.      -I personally reviewed updated the Past medical hx, Past surgical hx,Social hx, Family hx, Medications, Allergies in the discrete data section of the patient chart along with labs, Pulmonary medicine,Sleep medicine related, Pathological, Microbiological and Radiological investigations.

## 2022-03-02 ENCOUNTER — OFFICE VISIT (OUTPATIENT)
Dept: PULMONOLOGY | Age: 47
End: 2022-03-02
Payer: COMMERCIAL

## 2022-03-02 VITALS
WEIGHT: 273.2 LBS | SYSTOLIC BLOOD PRESSURE: 108 MMHG | TEMPERATURE: 97.6 F | BODY MASS INDEX: 45.52 KG/M2 | HEIGHT: 65 IN | OXYGEN SATURATION: 97 % | DIASTOLIC BLOOD PRESSURE: 64 MMHG | HEART RATE: 79 BPM

## 2022-03-02 DIAGNOSIS — Z99.89 OSA ON CPAP: Primary | ICD-10-CM

## 2022-03-02 DIAGNOSIS — G47.33 OSA ON CPAP: Primary | ICD-10-CM

## 2022-03-02 DIAGNOSIS — G47.00 INSOMNIA, UNSPECIFIED TYPE: ICD-10-CM

## 2022-03-02 PROCEDURE — G8417 CALC BMI ABV UP PARAM F/U: HCPCS | Performed by: INTERNAL MEDICINE

## 2022-03-02 PROCEDURE — G8484 FLU IMMUNIZE NO ADMIN: HCPCS | Performed by: INTERNAL MEDICINE

## 2022-03-02 PROCEDURE — 99214 OFFICE O/P EST MOD 30 MIN: CPT | Performed by: INTERNAL MEDICINE

## 2022-03-02 PROCEDURE — 1036F TOBACCO NON-USER: CPT | Performed by: INTERNAL MEDICINE

## 2022-03-02 PROCEDURE — G8427 DOCREV CUR MEDS BY ELIG CLIN: HCPCS | Performed by: INTERNAL MEDICINE

## 2022-03-02 RX ORDER — LANOLIN ALCOHOL/MO/W.PET/CERES
3 CREAM (GRAM) TOPICAL NIGHTLY PRN
Qty: 30 TABLET | Refills: 11 | Status: SHIPPED | OUTPATIENT
Start: 2022-03-02 | End: 2022-07-19

## 2022-03-02 NOTE — PATIENT INSTRUCTIONS
Recommendations/Plan:  -Start patient on Melatonin 3mg PO daily at bed time PRN. She was instructed to not to drive any motor vehicles or operate heavy equipment after taking the pill until sleep symptoms are under control. She was detailed about mechanism of action of drug along with associated side effects. She agreed to take the risk and medication. She verbalizes understanding.  -She was advised to decrease her dose of Hydroxyzine 10 Mg P.O. Nightly to Improve Her Daytime Sleepiness.  -She was advised to make an appointment with her psychiatrist at [facility] for further evaluation and optimization of her antidepressant medications. She should be considered for discontinuing Prozac due to worsening of her restless leg syndrome at nighttime and consider alternative treatment for her depression if needed. She is also currently waiting for PTSD treatment by her psychiatrist at the [facility] along with nightmares.  -She was advised to continue current positive airway pressure therapy with above described pressure.  -She was advised to keep good compliance with current recommended pressure to get optimal results and clinical improvement.  -Follow with my clinic in 6months for clinical reevaluation with review of download. -She was advised to call [employer] regarding supplies if needed.  -She was advised to call my office for earlier appointment if needed for worsening of sleep symptoms.  -Shewas advised to loose weight by controlling diet and doing exercise. She is currently following with [facility] weight management center with Dr. Jane Alba  -She was instructed to not to drive any motor vehicles or operate heavy equipment if she feels sleepy. -Abbey Dang was educated about my impression and plan. Patient verbalizes understanding.

## 2022-03-02 NOTE — PROGRESS NOTES
Chief Complaint: Johana Smiley is a 3 month f/u with download     Mallampati airway Class:3  Neck Circumference:.16 Inches    [place] sleepiness score 3/2/22: 13  Sleep apnea quality of life questionnaire:.50      Diagnostic Data: Psg 10/20/16 Ahi 45.1  PAP Download:   Recorded compliance dates:1/29/22-2/27/22  More than 4hour usage compliance was:70%.   Average residual Apnea- Hypoapnea index on current pressue was:1.5.      PAP Type cpap   Level  11     Average usuage hours per day was:.[id number]     Interface: full  Provider:  [x][facility]  [][employer]  []Judy  []Sonaliare         [][facility] []Other:

## 2022-03-21 ENCOUNTER — OFFICE VISIT (OUTPATIENT)
Dept: BARIATRICS/WEIGHT MGMT | Age: 47
End: 2022-03-21

## 2022-03-21 ENCOUNTER — OFFICE VISIT (OUTPATIENT)
Dept: BARIATRICS/WEIGHT MGMT | Age: 47
End: 2022-03-21
Payer: COMMERCIAL

## 2022-03-21 ENCOUNTER — TELEPHONE (OUTPATIENT)
Dept: CARDIOLOGY CLINIC | Age: 47
End: 2022-03-21

## 2022-03-21 VITALS
HEIGHT: 66 IN | HEART RATE: 88 BPM | DIASTOLIC BLOOD PRESSURE: 86 MMHG | SYSTOLIC BLOOD PRESSURE: 122 MMHG | RESPIRATION RATE: 18 BRPM | TEMPERATURE: 97.7 F | WEIGHT: 266.4 LBS | BODY MASS INDEX: 42.81 KG/M2

## 2022-03-21 DIAGNOSIS — F32.A DEPRESSION, UNSPECIFIED DEPRESSION TYPE: ICD-10-CM

## 2022-03-21 DIAGNOSIS — K21.9 GASTROESOPHAGEAL REFLUX DISEASE WITHOUT ESOPHAGITIS: ICD-10-CM

## 2022-03-21 DIAGNOSIS — Z99.89 OSA ON CPAP: ICD-10-CM

## 2022-03-21 DIAGNOSIS — G25.81 RLS (RESTLESS LEGS SYNDROME): ICD-10-CM

## 2022-03-21 DIAGNOSIS — N32.81 OAB (OVERACTIVE BLADDER): ICD-10-CM

## 2022-03-21 DIAGNOSIS — F41.9 ANXIETY: ICD-10-CM

## 2022-03-21 DIAGNOSIS — E78.5 HYPERLIPIDEMIA, UNSPECIFIED HYPERLIPIDEMIA TYPE: ICD-10-CM

## 2022-03-21 DIAGNOSIS — E11.9 TYPE 2 DIABETES MELLITUS WITHOUT COMPLICATION, WITHOUT LONG-TERM CURRENT USE OF INSULIN (HCC): ICD-10-CM

## 2022-03-21 DIAGNOSIS — J45.909 UNCOMPLICATED ASTHMA, UNSPECIFIED ASTHMA SEVERITY, UNSPECIFIED WHETHER PERSISTENT: ICD-10-CM

## 2022-03-21 DIAGNOSIS — G47.33 OSA ON CPAP: ICD-10-CM

## 2022-03-21 DIAGNOSIS — Z01.818 PRE-OP TESTING: ICD-10-CM

## 2022-03-21 DIAGNOSIS — E66.01 MORBID OBESITY WITH BMI OF 40.0-44.9, ADULT (HCC): Primary | ICD-10-CM

## 2022-03-21 PROCEDURE — 1036F TOBACCO NON-USER: CPT | Performed by: PHYSICIAN ASSISTANT

## 2022-03-21 PROCEDURE — G8417 CALC BMI ABV UP PARAM F/U: HCPCS | Performed by: PHYSICIAN ASSISTANT

## 2022-03-21 PROCEDURE — 2022F DILAT RTA XM EVC RTNOPTHY: CPT | Performed by: PHYSICIAN ASSISTANT

## 2022-03-21 PROCEDURE — G8484 FLU IMMUNIZE NO ADMIN: HCPCS | Performed by: PHYSICIAN ASSISTANT

## 2022-03-21 PROCEDURE — 3046F HEMOGLOBIN A1C LEVEL >9.0%: CPT | Performed by: PHYSICIAN ASSISTANT

## 2022-03-21 PROCEDURE — 99213 OFFICE O/P EST LOW 20 MIN: CPT | Performed by: PHYSICIAN ASSISTANT

## 2022-03-21 PROCEDURE — G8427 DOCREV CUR MEDS BY ELIG CLIN: HCPCS | Performed by: PHYSICIAN ASSISTANT

## 2022-03-21 RX ORDER — CALCIUM CARBONATE 500(1250)
500 TABLET ORAL DAILY
COMMUNITY
End: 2022-07-26

## 2022-03-21 SDOH — HEALTH STABILITY: PHYSICAL HEALTH: ON AVERAGE, HOW MANY MINUTES DO YOU ENGAGE IN EXERCISE AT THIS LEVEL?: 30 MIN

## 2022-03-21 SDOH — ECONOMIC STABILITY: INCOME INSECURITY: IN THE LAST 12 MONTHS, WAS THERE A TIME WHEN YOU WERE NOT ABLE TO PAY THE MORTGAGE OR RENT ON TIME?: NO

## 2022-03-21 SDOH — ECONOMIC STABILITY: FOOD INSECURITY: WITHIN THE PAST 12 MONTHS, YOU WORRIED THAT YOUR FOOD WOULD RUN OUT BEFORE YOU GOT MONEY TO BUY MORE.: SOMETIMES TRUE

## 2022-03-21 SDOH — HEALTH STABILITY: PHYSICAL HEALTH: ON AVERAGE, HOW MANY DAYS PER WEEK DO YOU ENGAGE IN MODERATE TO STRENUOUS EXERCISE (LIKE A BRISK WALK)?: 5 DAYS

## 2022-03-21 SDOH — ECONOMIC STABILITY: TRANSPORTATION INSECURITY
IN THE PAST 12 MONTHS, HAS THE LACK OF TRANSPORTATION KEPT YOU FROM MEDICAL APPOINTMENTS OR FROM GETTING MEDICATIONS?: NO

## 2022-03-21 SDOH — ECONOMIC STABILITY: HOUSING INSECURITY
IN THE LAST 12 MONTHS, WAS THERE A TIME WHEN YOU DID NOT HAVE A STEADY PLACE TO SLEEP OR SLEPT IN A SHELTER (INCLUDING NOW)?: NO

## 2022-03-21 SDOH — ECONOMIC STABILITY: HOUSING INSECURITY: IN THE LAST 12 MONTHS, HOW MANY PLACES HAVE YOU LIVED?: 1

## 2022-03-21 SDOH — ECONOMIC STABILITY: TRANSPORTATION INSECURITY
IN THE PAST 12 MONTHS, HAS LACK OF TRANSPORTATION KEPT YOU FROM MEETINGS, WORK, OR FROM GETTING THINGS NEEDED FOR DAILY LIVING?: NO

## 2022-03-21 SDOH — ECONOMIC STABILITY: FOOD INSECURITY: WITHIN THE PAST 12 MONTHS, THE FOOD YOU BOUGHT JUST DIDN'T LAST AND YOU DIDN'T HAVE MONEY TO GET MORE.: SOMETIMES TRUE

## 2022-03-21 ASSESSMENT — PATIENT HEALTH QUESTIONNAIRE - PHQ9
SUM OF ALL RESPONSES TO PHQ9 QUESTIONS 1 & 2: 2
2. FEELING DOWN, DEPRESSED OR HOPELESS: SEVERAL DAYS
1. LITTLE INTEREST OR PLEASURE IN DOING THINGS: SEVERAL DAYS
DEPRESSION UNABLE TO ASSESS: YES

## 2022-03-21 ASSESSMENT — LIFESTYLE VARIABLES
HOW OFTEN DO YOU HAVE A DRINK CONTAINING ALCOHOL: MONTHLY OR LESS
HOW MANY STANDARD DRINKS CONTAINING ALCOHOL DO YOU HAVE ON A TYPICAL DAY: 3 OR 4

## 2022-03-21 ASSESSMENT — SOCIAL DETERMINANTS OF HEALTH (SDOH)
HOW HARD IS IT FOR YOU TO PAY FOR THE VERY BASICS LIKE FOOD, HOUSING, MEDICAL CARE, AND HEATING?: NOT VERY HARD
HOW OFTEN DO YOU ATTEND CHURCH OR RELIGIOUS SERVICES?: MORE THAN 4 TIMES PER YEAR
WITHIN THE LAST YEAR, HAVE YOU BEEN KICKED, HIT, SLAPPED, OR OTHERWISE PHYSICALLY HURT BY YOUR PARTNER OR EX-PARTNER?: NO
WITHIN THE LAST YEAR, HAVE YOU BEEN AFRAID OF YOUR PARTNER OR EX-PARTNER?: NO
HOW OFTEN DO YOU GET TOGETHER WITH FRIENDS OR RELATIVES?: THREE TIMES A WEEK
HOW OFTEN DO YOU ATTENT MEETINGS OF THE CLUB OR ORGANIZATION YOU BELONG TO?: PATIENT DECLINED
WITHIN THE LAST YEAR, HAVE YOU BEEN HUMILIATED OR EMOTIONALLY ABUSED IN OTHER WAYS BY YOUR PARTNER OR EX-PARTNER?: NO
IN A TYPICAL WEEK, HOW MANY TIMES DO YOU TALK ON THE PHONE WITH FAMILY, FRIENDS, OR NEIGHBORS?: MORE THAN THREE TIMES A WEEK
DO YOU BELONG TO ANY CLUBS OR ORGANIZATIONS SUCH AS CHURCH GROUPS UNIONS, FRATERNAL OR ATHLETIC GROUPS, OR SCHOOL GROUPS?: NO
WITHIN THE LAST YEAR, HAVE TO BEEN RAPED OR FORCED TO HAVE ANY KIND OF SEXUAL ACTIVITY BY YOUR PARTNER OR EX-PARTNER?: NO

## 2022-03-21 NOTE — PROGRESS NOTES
Assessment: Patient is a 51 y.o. female seen for month five    follow up MNT visit for  pre op bariatric surgery desires sleeve    Vitals from current and previous visits:    Vitals 4/76/4716   SYSTOLIC 053   DIASTOLIC 86   Site Left Upper Arm   Position Sitting   Cuff Size Large Adult   Pulse 88   Temp 97.7   Resp 18   SpO2    Weight 266 lb 6.4 oz   Height 5' 6\"   Body mass index 42.99 kg/m2   Pain Level    Waist (Inches)    Neck circumference (Inches)        Initial weight at start of Weight Management Program was: 281 lbs     Nito Ceron gained 6 lbs from last visit in December  -Weight goal: lose weight.     -Nutritionally relevant labs: Initial labs-  HA1C today was 6.9%, Vitamin D-40, Iron levels WNL. B1-130  Lab Results   Component Value Date/Time    LABA1C 6.9 (H) 10/04/2021 11:07 AM    LABA1C 6.5 (H) 11/16/2017 11:04 AM    LABA1C 8.6 08/10/2017 04:24 PM    GLUCOSE 140 (H) 10/04/2021 11:06 AM    GLUCOSE 184 (H) 12/05/2020 01:20 PM    CHOL 131 10/04/2021 11:06 AM    HDL 59 10/04/2021 11:06 AM    LDLCALC 53 10/04/2021 11:06 AM    TRIG 97 10/04/2021 11:06 AM     Dr Lio Tyson Clearance obtained   DARIEN consult  completed-  Had follow up with pulmonary- requires PFTs and pulmonary clearance yet  Requires Cardiology clearance d/t insurance plan  Pt had EGD done  Has appointment April 5th with neuro psych regarding medications- pt has counselor called \"[facility]\"and is seen weekly    - Is patient taking daily Multivitamin:  Does take a MVI-  taking Women's gummies MVI two per day    Only doing [employer] and [employer] instead of [employer]. Sets own hours now- usually doesn't start working till ~ 12p-8p     Kids ages 15 and 15   Goes to bed between 10-11pm  Will wake up at 6:30am     -Food Recall:     Using madonna on phone [employer].   - Pt no longer drinking milk due to lactose intolerance  Breakfast: pt home at breakfast Elevation Brand protein bars- 20 grams protein - [employer]  Lunch:  States has been eating a couple eggs, toast before starts work and one cup coffee  Dinner: ~spouse cooks dinner or patient may have a burrito from [facility]. If home by 7pm will eat dinner- cooked stew    Snacks: denies much snacking between meals or in car when working- starlite mints. States cut out bowl of cereal in evening  Main Beverages:  drinking tea\"Smooth Move\" 8 oz with splenda in evening, up to 5-6  bottles water a day  ~  1 cup coffee /day with artificial sweetener (splenda) and powder creamer in morning, cut all diet pop      -Impression of Dietary Intake: Pt  is working towards avoiding high fat/high sugar foods. Pt  is working towards  including protein at meals and snacks. Exercise:    -Physical Activity is:  Doing Chair exercises ~ 20 minutes at a time. Also pushing shopping cart for work  Provided patient with exercise log to record daily activity and return to office each month. Recommendation given to record activity at least 24 days of the month. This will be submitted to insurance prior to approval for bariatric surgery. Nutrition Diagnosis: Overweight/Obesity related to currently undergoing MNT as evidenced by BMI of 43  Intervention:  Patient has attended support groups via [employer] two. Monthly goals reviewed with pt. Patient Instructions   Goals:  1. Nutrition Goal:  Continue to eat something small within an hour of waking up in an effort to not skip meals-   Aim for regular meal times as much as possible  2. Water Goal:  Continue at least ~ 5-6 bottles water a day  3. Exercise Goal: Continue to do  chair exercises at home at least 15-20 minutes every day. Bring exercise log back to next office visit.         -Followup visit:4/29/22 with JIMENEZ and Dr Hadley Bird prior to [facility] [employer] [facility]. Requires PFTs and a Pulmonary Clearance.   Requires Cardiology clearance per insurance requirement     Herrera Matamoros RD, LD,   Dietitian- [facility] [address]

## 2022-03-21 NOTE — PATIENT INSTRUCTIONS
Goals: 1. Nutrition Goal:  Continue to eat something small within an hour of waking up in an effort to not skip meals-   Aim for regular meal times as much as possible  2. Water Goal:  Continue at least ~ 5-6 bottles water a day  3. Exercise Goal: Continue to do  chair exercises at home at least 15-20 minutes every day. Bring exercise log back to next office visit.

## 2022-03-21 NOTE — PATIENT INSTRUCTIONS
· Behavior modification discussed in detail in regards to dietary habits. · Nutritional education occurred during visit. Continue following recommendations  per dietitian. · Improvement in fitness/exercise discussed with patient and the need for this  with/without surgery. · Continue CPAP  · Pulmonary Clearance needed prior to surgery - Dr. Tatiana Krishna- will send clearance letter  · Psychology evaluation with Dr. Cecilia Romero completed and reviewed. · Follow up with counselor in [facility] as scheduled. · EGD completed and reviewed. H. Pylori (-)  · Encouraged to attend support groups. · Goal to lose 3% TBW prior to surgery. Currently down 15#  · Seca scale completed and reviewed. · Initial labs completed and reviewed  · A1C 6.9- must remain < 8 prior to surgery  · EKG completed and reviewed. · Advised not to get pregnant for 18-24 months post bariatric surgery as this can increase risk of malnourishment potentially leading to low birth weight or malformation. (hysterectomy)  · Will need to be off work for 3-4 weeks post-bariatric surgery. · No lifting/pushing/pulling over 20# for 4 weeks post-op  · No NSAIDS (Ibuprofen)10 days prior to bariatric surgery. Avoid NSAID use post-op. Off NSAIDS. Continues Tylenol and Voltaren gel as needed. · Discussed Lovenox post-op bariatric surgery  · LOMN received   · Mammogram completed and reviewed- no concerning findings. · Follow up with neuropsychiatry in [facility] as scheduled  · Follow up with psychiatrist in [facility] as scheduled. Needs to be stable on anti-depressant medications prior to surgery.

## 2022-03-21 NOTE — TELEPHONE ENCOUNTER
Received referral and patient wanted seen this week. She doesn't have a preference for a physician. Appt scheduled this week with Dr. Anastacia Min.

## 2022-03-21 NOTE — PROGRESS NOTES
[address] [facility]  [address]  [facility], [address] [facility]  [phone]      Visit Date:  3/21/2022  Weight Management Pre-Op Follow-up    HPI:    Medically Supervised follow-up- Month #5 of 6- desires peggy Amato is here today for continued supervised weight management of morbid obesity. Weight today 266#. Down 3# since last month. Down 15# since starting with weight management. BMI 43. Feels that she has had a good month with nutrition. Has been tracking all food intake. Has stopped drinking Slimfast shakes due to lactose intolerance. Plans to try Fairlife protein shakes. Continues to eat 6 small meals daily. Has been mindful of food choices. Avoiding fast food. Has been drinking 8 bottles of water daily. Also drinking sugar free minute made juice. No pop. Drinking one cup of coffee daily. Has been tracking steps and averaging 5k steps daily. No longer working for [employer] Currently only working for [employer] and [employer]. Financially doing better. Has been able to get food stamps which has helped. Continues Voltaren and knee brace for left knee. No NSAIDS. Continues to follow with neuropsychiatrist in [facility]. Memory okay. Diagnosed with PTSD and started EMDR ( eye movement desensitization and reprocessing) counseling through [facility] in [place]. Stopped taking Prozac and hydroxizine after initial apt with Dr. Yamila Becker as he felt medications could have been causing worsening of RLS and EDS. Has been sleeping better since stopping medications. Reports that depression is stable. Has a follow up 4/5/22 with psychiatrist in [facility] to discuss adjusting medications. Comorbid conditions include sleep apnea, diabetes mellitus, anxiety, depression, asthma, Gerd, OAB, hyperlipidemia, RLS. Sandria Fuss well controlled with Prilosec. Discussed possibility of worsening GERD that may require additional medication or revision surgery.  Recent A1C 6.9- must remain < 8 prior to surgery. Checking blood sugars 1 times daily and running 130-150. LOMN received. Has completed online support groups on Youtube. EKG completed and reviewed. EGD completed and reviewed. H. Pylori (-). Compliant with CPAP- awaiting clearance from Dr. Stephanie Alcantara. Psychological clearance with Dr. Francia Henning completed and reviewed. Mammogram completed and reviewed. No concerning findings. Discussed process moving forward with bariatric surgery. Excited to process with sleeve gastrectomy. Physical Activity: Walking /chair exercises    Current BMI: Body mass index is 43 kg/m².   Current Weight:   Wt Readings from Last 3 Encounters:   22 266 lb 6.4 oz (120.8 kg)   22 273 lb 3.2 oz (123.9 kg)   22 269 lb 9.6 oz (122.3 kg)     Initial Body [id number]    Past Medical History:  Past Medical History:   Diagnosis Date    Allergic rhinitis     Anxiety     Asthma     Depression     Diabetes ([facility] [address]     Psychiatric problem     depression    Sleep apnea        Past Surgical History:  Past Surgical History:   Procedure Laterality Date    CARPAL TUNNEL RELEASE       SECTION      HYSTERECTOMY      KNEE ARTHROSCOPY         Past Social History:  Social History     Socioeconomic History    Marital status:      Spouse name: Not on file    Number of children: 3    Years of education: Not on file    Highest education level: Not on file   Occupational History    Not on file   Tobacco Use    Smoking status: Never Smoker    Smokeless tobacco: Never Used   Vaping Use    Vaping Use: Never used   Substance and Sexual Activity    Alcohol use: Yes     Comment: socially    Drug use: No    Sexual activity: Yes   Other Topics Concern    Not on file   Social History Narrative    Not on file     Social Determinants of Health     Financial Resource Strain: Low Risk     Difficulty of Paying Living Expenses: Not very hard   Food Insecurity: Food Insecurity Present    Worried About Running Out of Food in the Last Year: Sometimes true    Ran Out of Food in the Last Year: Sometimes true   Transportation Needs: No Transportation Needs    Lack of Transportation (Medical): No    Lack of Transportation (Non-Medical): No   Physical Activity: Sufficiently Active    Days of Exercise per Week: 5 days    Minutes of Exercise per Session: 30 min   Stress: Stress Concern Present    Feeling of Stress : Rather much   Social Connections: Moderately Integrated    Frequency of Communication with Friends and Family: More than three times a week    Frequency of Social Gatherings with Friends and Family: Three times a week    Attends Religion Services: More than 4 times per year    Active Member of Clubs or Organizations: No    Attends Club or Organization Meetings: Patient refused    Marital Status:    Intimate Partner Violence: Not At Risk    Fear of Current or Ex-Partner: No    Emotionally Abused: No    Physically Abused: No    Sexually Abused: No   Housing Stability: Low Risk     Unable to Pay for Housing in the Last Year: No    Number of [place] in the Last Year: 1    Unstable Housing in the Last Year: No        Medications:   Current Outpatient Medications   Medication Sig Dispense Refill    calcium carbonate (OSCAL) 500 MG TABS tablet Take 500 mg by mouth daily      melatonin 3 MG TABS tablet Take 1 tablet by mouth nightly as needed (Insomnia) 30 tablet 11    Multiple Vitamins-Minerals (THERAPEUTIC MULTIVITAMIN-MINERALS) tablet Take 1 tablet by mouth daily      CPAP Machine MISC by Does not apply route Please check patient's CPAP machine for proper functioning by [employer]. She was prescribed with a CPAP with a pressure of 11 cm of water with room air.  1 each 0    traZODone (DESYREL) 150 MG tablet Take 150 mg by mouth nightly      Dulaglutide (TRULICITY) 1.93 OX/1.1NM SOPN Inject 0.75 mg into the skin once a week      omeprazole (PRILOSEC) 40 MG delayed release capsule Take 1 capsule by mouth daily      oxybutynin (DITROPAN-XL) 5 MG extended release tablet Take 1 tablet by mouth daily       atorvastatin (LIPITOR) 40 MG tablet TAKE ONE TABLET BY MOUTH DAILY 30 tablet 3    metFORMIN (GLUCOPHAGE) 1000 MG tablet TAKE ONE TABLET BY MOUTH TWICE A DAY WITH MEALS (Patient taking differently: See Admin Instructions 500 mg in AM, 1000mg in PM.) 60 tablet 3    Blood Glucose Monitoring Suppl (ACCU-CHEK ROD PLUS) w/Device KIT Check blood sugar once daily 1 kit 0    glucose blood VI test strips (ACCU-CHEK ROD PLUS) strip Check blood sugar once daily 100 each 3    Lancets MISC Use to test blood glucose level 1 time per day. Diagnosis: E11.9 100 each 1    Cetirizine HCl (ZYRTEC ALLERGY) 10 MG CAPS Take 1 tablet by mouth daily      montelukast (SINGULAIR) 10 MG tablet Take 10 mg by mouth nightly      rOPINIRole (REQUIP) 0.25 MG tablet Take 4 mg by mouth nightly       albuterol sulfate  (90 BASE) MCG/ACT inhaler Inhale 2 puffs into the lungs every 6 hours as needed for Wheezing      hydrOXYzine (ATARAX) 10 MG tablet Take 10 mg by mouth 3 times daily as needed for Itching (Patient not taking: Reported on 3/21/2022)      FLUoxetine (PROZAC) 20 MG capsule Take 40 mg by mouth daily  (Patient not taking: Reported on 3/21/2022)       No current facility-administered medications for this visit. Allergies: Allergies   Allergen Reactions    Latex Rash    Keflex [Cephalexin] Rash    Betadine [Povidone Iodine]     Neosporin [Neomycin-Polymyxin-Gramicidin]     Pcn [Penicillins]     Rocephin [Ceftriaxone] Rash       Subjective:    Review of Systems:  Constitutional: Denies any fever, chills, (+)fatigue. Wound: Denies any rash, skin color changes or wound problems. Resp: Denies any cough, shortness of breath. CV: Denies any chest pain, orthopnea or syncope. MS: Denies myalgias, (+)arthralgias. GI: Denies any nausea, vomiting, diarrhea, constipation, melena, hematochezia.  (+) reflux  : Denies any hematuria, hesitancy or dysuria. NEURO: Denies seizures, headache. Objective:    /86 (Site: Left Upper Arm, Position: Sitting, Cuff Size: Large Adult)   Pulse 88   Temp 97.7 °F (36.5 °C) (Infrared)   Resp 18   Ht 5' 6\" (1.676 m)   Wt 266 lb 6.4 oz (120.8 kg)   BMI 43.00 kg/m²   Physical Examination:   Constitutional: Alert and oriented to person, place and time. Well-developed, well- nourished. Head: Normocephalic and atraumatic  Neck: Supple. Eyes: EOMI b/l. Conjunctivae normal.  No scleral icterus. Respiratory: Effort normal. No respiratory distress. Abd: Benign  Ext:  Movement x 4. No edema  Skin; Warm and dry, no visible rashes, lesions or ulcers.    Neuro: Cranial Nerves Grossly Intact; nml coordination        CBC   Lab Results   Component Value Date    WBC 6.7 10/04/2021    RBC 4.28 10/04/2021    HGB 12.6 10/04/2021    HCT 39.2 10/04/2021    MCV 91.6 10/04/2021    MCH 29.4 10/04/2021    MCHC 32.1 10/04/2021    RDW 15.0 07/27/2017     10/04/2021    MPV 11.3 10/04/2021    RBCMORP NORMAL 07/27/2017    SEGSPCT 67.6 10/04/2021    LABLYMP 20.8 10/04/2021    MONOPCT 7.0 10/04/2021    LABEOS 3.4 10/04/2021    BASO 0.9 10/04/2021    NRBC 0 10/04/2021    ANISOCYTOSIS 1+ 07/27/2017    SEGSABS 4.5 10/04/2021    LYMPHSABS 1.4 10/04/2021    MONOSABS 0.5 10/04/2021    EOSABS 0.2 10/04/2021    BASOSABS 0.1 10/04/2021        BMP/CMP   Lab Results   Component Value Date    GLUCOSE 140 10/04/2021    CREATININE 0.6 10/04/2021    BUN 12 10/04/2021     10/04/2021    K 4.1 10/04/2021    K 4.3 12/05/2020     10/04/2021    CO2 23 10/04/2021    CALCIUM 9.1 10/04/2021    AST 15 10/04/2021    ALKPHOS 68 10/04/2021    PROT 6.9 10/04/2021    LABALBU 4.2 10/04/2021    BILITOT 0.5 10/04/2021    ALT 14 10/04/2021        PREALBUMIN   Lab Results   Component Value Date    PREALBUMIN 21.4 10/04/2021        VITAMIN B12   Lab Results   Component Value Date    YCHJIJVA93 517 10/04/2021        VITAMIN D   Lab Results   Component Value Date    VITD25 40 10/04/2021        PTH  Lab Results   Component Value Date    IPTH 50.3 10/04/2021       VITAMIN B1/ THIAMINE   Lab Results   Component Value Date    [id number] 130 10/04/2021        LIPID SCREEN (FASTING)   Lab Results   Component Value Date    CHOL 131 10/04/2021    TRIG 97 10/04/2021    HDL 59 10/04/2021    LDLCALC 53 10/04/2021   ,     HGA1C (T2DM ONLY)   Lab Results   Component Value Date    LABA1C 6.9 10/04/2021    AVGG 147 10/04/2021        TSH   Lab Results   Component Value Date    TSH 1.070 10/04/2021        IRON   Lab Results   Component Value Date    IRON 54 10/04/2021        TIBC  Lab Results   Component Value Date    TIBC 269 10/04/2021       FERRITIN  Lab Results   Component Value Date    FERRITIN 68 10/04/2021       VITAMIN A  Lab Results   Component Value Date    RETINOL SEE BELOW 10/04/2021       NICOTINE  No results found for: NMET    UDS  Lab Results   Component Value Date    UDP SEE BELOW 10/04/2021       PSA  No results found for: LABPSA    GFR  Lab Results   Component Value Date    LABGLOM >90 10/04/2021       DEXA  No results found for this or any previous visit. Assessment:       Diagnosis Orders   1. BMI 40.0-44.9, adult ([facility] [address]     2. Type 2 diabetes mellitus without complication, without long-term current use of insulin ([facility] [address]     3. Anxiety     4. Depression, unspecified depression type     5. Uncomplicated asthma, unspecified asthma severity, unspecified whether persistent     6. RLS (restless legs syndrome)     7. DARIEN on CPAP     8. Gastroesophageal reflux disease without esophagitis     9. OAB (overactive bladder)     10. Hyperlipidemia, unspecified hyperlipidemia type     11. Pre-op testing  Joaquin Lockett MD, Cardiology, [facility]    Full PFT Study Without Bronchdilator    XR CHEST (2 VW)       Plan:    · Behavior modification discussed in detail in regards to dietary habits.   · Nutritional education occurred during visit. Continue following recommendations  per dietitian. · Improvement in fitness/exercise discussed with patient and the need for this  with/without surgery. · Continue CPAP  · Pulmonary Clearance needed prior to surgery - Dr. Sosa More- will send clearance letter  · Psychology evaluation with Dr. Gerson Lehman completed and reviewed. · Follow up with counselor in [facility] Kelly Borrego) as scheduled. · EGD completed and reviewed. H. Pylori (-)  · Encouraged to attend support groups. · Goal to lose 3% TBW prior to surgery. Currently down 15#  · Seca scale completed and reviewed. · Initial labs completed and reviewed  · A1C 6.9- must remain < 8 prior to surgery  · EKG completed and reviewed. · Advised not to get pregnant for 18-24 months post bariatric surgery as this can increase risk of malnourishment potentially leading to low birth weight or malformation. (hysterectomy)  · Will need to be off work for 3-4 weeks post-bariatric surgery. · No lifting/pushing/pulling over 20# for 4 weeks post-op  · No NSAIDS (Ibuprofen)10 days prior to bariatric surgery. Avoid NSAID use post-op. Off NSAIDS. Continues Tylenol and Voltaren gel as needed. · Discussed Lovenox post-op bariatric surgery  · LOMN received   · Mammogram completed and reviewed- no concerning findings. · Follow up with neuropsychiatry in [place] as scheduled  · Follow up with psychiatrist in [place] as scheduled. Needs to be stable on anti-depressant medications prior to surgery. · Will need PFT/ CXR/ Pulmonary and cardiac clearance per insurance requirements- ordered today. Return in about 1 month (around 4/21/2022) for Follow up. I spent over 20 minutes with the patient, with greater that 50% of that time spent on education, counseling and coordination of care.      Electronically signed by DANIELITO Rider on 3/21/2022 at 10:58 AM

## 2022-03-24 ENCOUNTER — TELEPHONE (OUTPATIENT)
Dept: PULMONOLOGY | Age: 47
End: 2022-03-24

## 2022-03-24 ENCOUNTER — OFFICE VISIT (OUTPATIENT)
Dept: CARDIOLOGY CLINIC | Age: 47
End: 2022-03-24
Payer: COMMERCIAL

## 2022-03-24 VITALS
SYSTOLIC BLOOD PRESSURE: 123 MMHG | WEIGHT: 231 LBS | DIASTOLIC BLOOD PRESSURE: 86 MMHG | BODY MASS INDEX: 39.44 KG/M2 | HEIGHT: 64 IN | HEART RATE: 88 BPM

## 2022-03-24 DIAGNOSIS — Z01.818 PRE-OP EXAM: Primary | ICD-10-CM

## 2022-03-24 DIAGNOSIS — R94.31 ABNORMAL EKG: ICD-10-CM

## 2022-03-24 DIAGNOSIS — R06.09 DOE (DYSPNEA ON EXERTION): ICD-10-CM

## 2022-03-24 DIAGNOSIS — Z01.818 PRE-OP TESTING: ICD-10-CM

## 2022-03-24 DIAGNOSIS — I10 HYPERTENSION, UNSPECIFIED TYPE: ICD-10-CM

## 2022-03-24 DIAGNOSIS — G47.33 OSA (OBSTRUCTIVE SLEEP APNEA): Primary | ICD-10-CM

## 2022-03-24 PROCEDURE — G8417 CALC BMI ABV UP PARAM F/U: HCPCS | Performed by: INTERNAL MEDICINE

## 2022-03-24 PROCEDURE — G8427 DOCREV CUR MEDS BY ELIG CLIN: HCPCS | Performed by: INTERNAL MEDICINE

## 2022-03-24 PROCEDURE — 99244 OFF/OP CNSLTJ NEW/EST MOD 40: CPT | Performed by: INTERNAL MEDICINE

## 2022-03-24 PROCEDURE — G8484 FLU IMMUNIZE NO ADMIN: HCPCS | Performed by: INTERNAL MEDICINE

## 2022-03-24 PROCEDURE — 93000 ELECTROCARDIOGRAM COMPLETE: CPT | Performed by: INTERNAL MEDICINE

## 2022-03-24 NOTE — TELEPHONE ENCOUNTER
Received a clearance form from weight management for pre-surgical clearance. Per Dr Zaheer Ruth pt needs the pft and cxr that was ordered by Mounika Garza and then an appointment the next week. Called and left a message for patient to callback so we can get her set up for testing and appointment with Dr Zaheer Ruth.

## 2022-03-24 NOTE — PROGRESS NOTES
[id number] [address]  [facility] [address]  Dept: [phone]  Dept Fax: [phone]  Loc: [phone]    Visit Date: 3/24/2022    Ms. Kevan Kwok is a 51 y.o. female  who presented for:  Chief Complaint   Patient presents with    New Patient    Establish Cardiologist    Cardiac Clearance     Bariatrics with Dr. Stella Hoang     HPI:   HPI   Pako Arreguin is a pleasant 51year old female patient who  has a past medical history of Allergic rhinitis, Anxiety, Asthma, Depression, Diabetes ([address] Psychiatric problem, and Sleep apnea. EKG revealed SR, poor r wave progression in precordial leads. Patient denies chest pain,  orthopnea, paroxysmal nocturnal dyspnea, palpitations, dizziness, syncope, weight gain or leg swelling. She reports h/o shortness of breath, dyspnea on exertion. Patient has h/o asthma. She is being evaluated for bariatric surgery. Preoperative cardiac risk assessment was requested. Current Outpatient Medications:     calcium carbonate (OSCAL) 500 MG TABS tablet, Take 500 mg by mouth daily, Disp: , Rfl:     melatonin 3 MG TABS tablet, Take 1 tablet by mouth nightly as needed (Insomnia), Disp: 30 tablet, Rfl: 11    Multiple Vitamins-Minerals (THERAPEUTIC MULTIVITAMIN-MINERALS) tablet, Take 1 tablet by mouth daily, Disp: , Rfl:     CPAP Machine MISC, by Does not apply route Please check patient's CPAP machine for proper functioning by [employer].   She was prescribed with a CPAP with a pressure of 11 cm of water with room air., Disp: 1 each, Rfl: 0    traZODone (DESYREL) 150 MG tablet, Take 150 mg by mouth nightly, Disp: , Rfl:     Dulaglutide (TRULICITY) 3.00 RK/2.0UF SOPN, Inject 0.75 mg into the skin once a week, Disp: , Rfl:     omeprazole (PRILOSEC) 40 MG delayed release capsule, Take 1 capsule by mouth daily, Disp: , Rfl:     oxybutynin (DITROPAN-XL) 5 MG extended release tablet, Take 1 tablet by mouth daily , Disp: , Rfl:     atorvastatin (LIPITOR) 40 MG tablet, TAKE ONE TABLET BY MOUTH DAILY, Disp: 30 tablet, Rfl: 3    metFORMIN (GLUCOPHAGE) 1000 MG tablet, TAKE ONE TABLET BY MOUTH TWICE A DAY WITH MEALS (Patient taking differently: See Admin Instructions 500 mg in AM, 1000mg in PM.), Disp: 60 tablet, Rfl: 3    Blood Glucose Monitoring Suppl (ACCU-CHEK ROD PLUS) w/Device KIT, Check blood sugar once daily, Disp: 1 kit, Rfl: 0    glucose blood VI test strips (ACCU-CHEK ROD PLUS) strip, Check blood sugar once daily, Disp: 100 each, Rfl: 3    Lancets MISC, Use to test blood glucose level 1 time per day. Diagnosis: E11.9, Disp: 100 each, Rfl: 1    Cetirizine HCl (ZYRTEC ALLERGY) 10 MG CAPS, Take 1 tablet by mouth daily, Disp: , Rfl:     montelukast (SINGULAIR) 10 MG tablet, Take 10 mg by mouth nightly, Disp: , Rfl:     rOPINIRole (REQUIP) 0.25 MG tablet, Take 4 mg by mouth nightly , Disp: , Rfl:     albuterol sulfate  (90 BASE) MCG/ACT inhaler, Inhale 2 puffs into the lungs every 6 hours as needed for Wheezing, Disp: , Rfl:     hydrOXYzine (ATARAX) 10 MG tablet, Take 10 mg by mouth 3 times daily as needed for Itching (Patient not taking: Reported on 3/21/2022), Disp: , Rfl:     FLUoxetine (PROZAC) 20 MG capsule, Take 40 mg by mouth daily  (Patient not taking: Reported on 3/21/2022), Disp: , Rfl:     Past Medical History  Rosa Duran  has a past medical history of Allergic rhinitis, Anxiety, Asthma, Depression, Diabetes ([address] Psychiatric problem, and Sleep apnea. Social History  Rosa Duran  reports that she has never smoked. She has never used smokeless tobacco. She reports current alcohol use. She reports that she does not use drugs.     Family History  Rosa Duran family history includes Arthritis in her father, maternal grandfather, maternal grandmother, mother, paternal grandfather, paternal grandmother, and sister; Asthma in her maternal grandmother and mother; Cancer in her father, maternal uncle, and paternal grandfather; Depression in her mother; Diabetes in her sister; Thyroid Disease in her mother. Past Surgical History   Past Surgical History:   Procedure Laterality Date    CARPAL TUNNEL RELEASE       SECTION      HYSTERECTOMY      KNEE ARTHROSCOPY         Review of Systems   Constitutional: Negative for chills and fever  HENT: Negative for congestion, sinus pressure, sneezing and sore throat. Eyes: Negative for pain, discharge, redness and itching. Respiratory: Negative for apnea, cough  Gastrointestinal: Negative for blood in stool, constipation, diarrhea   Endocrine: Negative for cold intolerance, heat intolerance, polydipsia. Genitourinary: Negative for dysuria, enuresis, flank pain and hematuria. Musculoskeletal: Negative for arthralgias, joint swelling and neck pain. Neurological: Negative for numbness and headaches. Psychiatric/Behavioral: Negative for agitation, confusion, decreased concentration and dysphoric mood. Objective:     /86   Pulse 88   Ht 5' 4\" (1.626 m)   Wt 231 lb (104.8 kg)   BMI 39.65 kg/m²     Wt Readings from Last 3 Encounters:   22 231 lb (104.8 kg)   22 266 lb 6.4 oz (120.8 kg)   22 273 lb 3.2 oz (123.9 kg)     BP Readings from Last 3 Encounters:   22 123/86   22 122/86   22 108/64       Nursing note and vitals reviewed. Physical Exam   Constitutional: Oriented to person, place, and time. Appears well-developed and well-nourished. ENT: Moist mucous membranes. No bleeding. Tongue is midline. Head: Normocephalic and atraumatic. Eyes: EOM are normal. Pupils are equal, round, and reactive to light. Neck: Normal range of motion. Neck supple. No JVD present. Cardiovascular: Normal rate, regular rhythm, no murmur, no rubs, and intact distal pulses. Pulmonary/Chest: Effort normal and breath sounds normal. No respiratory distress. No wheezes. No rales. Abdominal: Soft.  Bowel sounds are normal. No distension. There is no tenderness. Musculoskeletal: Normal range of motion. no edema. Neurological: Alert and oriented to person, place, and time. No cranial nerve deficit. Coordination normal.   Skin: Skin is warm and dry. Psychiatric: Normal mood and affect. No results found for: CKTOTAL, CKMB, CKMBINDEX    Lab Results   Component Value Date    WBC 6.7 10/04/2021    RBC 4.28 10/04/2021    HGB 12.6 10/04/2021    HCT 39.2 10/04/2021    MCV 91.6 10/04/2021    MCH 29.4 10/04/2021    MCHC 32.1 10/04/2021    RDW 15.0 07/27/2017     10/04/2021    MPV 11.3 10/04/2021       Lab Results   Component Value Date     10/04/2021    K 4.1 10/04/2021    K 4.3 12/05/2020     10/04/2021    CO2 23 10/04/2021    BUN 12 10/04/2021    LABALBU 4.2 10/04/2021    CREATININE 0.6 10/04/2021    CALCIUM 9.1 10/04/2021    LABGLOM >90 10/04/2021    GLUCOSE 140 10/04/2021       Lab Results   Component Value Date    ALKPHOS 68 10/04/2021    ALT 14 10/04/2021    AST 15 10/04/2021    PROT 6.9 10/04/2021    BILITOT 0.5 10/04/2021    BILIDIR <0.2 09/26/2017    LABALBU 4.2 10/04/2021       No results found for: MG    Lab Results   Component Value Date    INR 0.93 10/04/2021         Lab Results   Component Value Date    LABA1C 6.9 10/04/2021       Lab Results   Component Value Date    TRIG 97 10/04/2021    HDL 59 10/04/2021    LDLCALC 53 10/04/2021    LDLDIRECT 62 12/15/2016       Lab Results   Component Value Date    TSH 1.070 10/04/2021         Testing Reviewed:      I have individually reviewed the cardiac test below:    ECHO: No results found for this or any previous visit. AssessmentPlan:   Piyush Babcock is a pleasant 51year old female patient who  has a past medical history of Allergic rhinitis, Anxiety, Asthma, Depression, Diabetes ([address] Psychiatric problem, and Sleep apnea. EKG revealed SR, poor r wave progression in precordial leads.  Patient denies chest pain,  orthopnea, paroxysmal nocturnal dyspnea, palpitations, dizziness, syncope, weight gain or leg swelling. She reports h/o shortness of breath, dyspnea on exertion. Patient has h/o asthma. She is being evaluated for bariatric surgery. Preoperative cardiac risk assessment was requested. 1. Dyspnea on exertion  2. Shortness of breath  3. Abnormal EKG  4. Preoperative cardiac assessment   5. Morbid obesity  6. Pending bariatric surgery   7. DM  8. HTN  9. Asthma   10. DARIEN     EKG reviewed    Has h/o CAMPBELL, SOB   She is being evaluated for bariatric surgery. Preoperative cardiac risk assessment was requested   Has multiple risk factors for CAD   Based on patient's risk factors and clinical presentation, stress test is indicated to investigate for possible underlying ischemic heart disease. Patient  agrees with that work up and its risks and benefits and potential need for additional testing if stress test is abnormal such as cardiac catheterization   Will need to investigate for underlying structural heart disease, will proceed with a transthoracic echocardiogram    Further risk assessment pending above studies     Above findings and plan of care were discussed with patient in details, patient's questions were answered.      Disposition:  RTC PRN, pending above studies             Electronically signed by Janette Bower MD, [facility]    3/24/2022 at 4:00 PM EDT

## 2022-03-28 ENCOUNTER — TELEPHONE (OUTPATIENT)
Dept: ADMINISTRATIVE | Age: 47
End: 2022-03-28

## 2022-03-28 ENCOUNTER — TELEPHONE (OUTPATIENT)
Dept: CARDIOLOGY CLINIC | Age: 47
End: 2022-03-28

## 2022-03-28 DIAGNOSIS — Z01.810 PREOP CARDIOVASCULAR EXAM: Primary | ICD-10-CM

## 2022-03-28 DIAGNOSIS — R94.31 ABNORMAL EKG: ICD-10-CM

## 2022-03-28 NOTE — TELEPHONE ENCOUNTER
This encounter was entered in MRN [id number] and not her MRN which is [id number]. This encounter will be closed. Call out to Rajesh Clark who started this encounter.

## 2022-03-28 NOTE — TELEPHONE ENCOUNTER
EKG revealed SR, poor r wave progression in precordial leads, possible anterior infarct   Has Dyspnea on exertion and morbid obesity, may not reach target heart rate  In setting of dyspnea on exertion, SOB and abnormal EKG, will need to investigate for underlying structural heart disease, will proceed with a transthoracic echocardiogram. Ddx includes CHF, CAD

## 2022-03-28 NOTE — TELEPHONE ENCOUNTER
Anupama Devine MD  [facility] Clinical Support Pool 14 minutes ago (2:56 PM)     AM       We can try the treadmill stress test first         Documentation       You routed conversation to [facility] Clinical Support Pool; Anupama Devine MD 14 minutes ago (2:56 PM)     You 14 minutes ago (2:56 PM)     Fara Valencia       I called and talked to patient and she is able to walk on a treadmill. NM stress and echo denied.      VO- Cancel NM stress and echo due to insurance denial and order regular treadmill stress test.      Please agree.      Order given to scheduling. Documentation       Anupama Devine MD  [facility] Clinical Support Pool 14 minutes ago (2:55 PM)     AM       EKG revealed SR, poor r wave progression in precordial leads, possible anterior infarct   Has Dyspnea on exertion and morbid obesity, may not reach target heart rate  In setting of dyspnea on exertion, SOB and abnormal EKG, will need to investigate for underlying structural heart disease, will proceed with a transthoracic echocardiogram. Ddx includes CHF, CAD             Documentation       Mahad Moura RN routed conversation to [facility] Clinical Support Pool; Anupama Devine MD 4 hours ago (11:10 AM)     Mahad Moura RN 4 hours ago (11:10 AM)     LG       The NM stress test and echo ordered for this patient are both being denied by the patient's insurance for the following:   - NM stress test: no documented reason why patient cannot do plain exercise GXT prior to ordering NM test;   - echo: insurance wants gxt results to determine if echo is needed     All pertinent clinical in the patient's chart has been supplied to the insurance company prior to these denials.  A peer to peer is being offered by calling [facility] @ [phone], and referencing tracking # [id number] (for both tests).     Please let me know if you have any questions - thank you.  [employer] Authorization  Phone: [phone]         Documentation

## 2022-03-28 NOTE — TELEPHONE ENCOUNTER
I called and talked to patient and she is able to walk on a treadmill. NM stress and echo denied. VO- Cancel NM stress and echo due to insurance denial and order regular treadmill stress test.     Please agree. Order given to scheduling.

## 2022-03-28 NOTE — TELEPHONE ENCOUNTER
The NM stress test and echo ordered for this patient are both being denied by the patient's insurance for the following:   - NM stress test: no documented reason why patient cannot do plain exercise GXT prior to ordering NM test;   - echo: insurance wants gxt results to determine if echo is needed    All pertinent clinical in the patient's chart has been supplied to the insurance company prior to these denials. A peer to peer is being offered by calling [facility] @ [phone], and referencing tracking # [id number] (for both tests). Please let me know if you have any questions - thank you.     [facility] Authorization  Phone: [phone]

## 2022-03-29 NOTE — TELEPHONE ENCOUNTER
Spoke with patient and transferred her to [facility] to get Pft scheduled.  Patient will callback here to get an appointment with Dr Chip Mejia

## 2022-03-31 NOTE — TELEPHONE ENCOUNTER
Called and left a message on patients machine to callback to advise patient of her appointment for pre-op clearance. Please advise pt to have a cxr along with pft before visit.

## 2022-04-08 ENCOUNTER — HOSPITAL ENCOUNTER (OUTPATIENT)
Dept: NON INVASIVE DIAGNOSTICS | Age: 47
Discharge: HOME | End: 2022-04-08
Payer: COMMERCIAL

## 2022-04-08 ENCOUNTER — HOSPITAL ENCOUNTER (OUTPATIENT)
Age: 47
Discharge: HOME | End: 2022-04-08

## 2022-04-08 ENCOUNTER — HOSPITAL ENCOUNTER (OUTPATIENT)
Dept: GENERAL RADIOLOGY | Age: 47
Discharge: HOME | End: 2022-04-08
Payer: COMMERCIAL

## 2022-04-08 DIAGNOSIS — Z01.818 PRE-OP TESTING: ICD-10-CM

## 2022-04-08 DIAGNOSIS — Z01.810 PREOP CARDIOVASCULAR EXAM: ICD-10-CM

## 2022-04-08 PROCEDURE — 71046 X-RAY EXAM CHEST 2 VIEWS: CPT

## 2022-04-08 PROCEDURE — 93017 CV STRESS TEST TRACING ONLY: CPT | Performed by: INTERNAL MEDICINE

## 2022-04-11 ENCOUNTER — TELEPHONE (OUTPATIENT)
Dept: CARDIOLOGY CLINIC | Age: 47
End: 2022-04-11

## 2022-04-11 NOTE — TELEPHONE ENCOUNTER
Regular stress test results  No chest pain during the stress. Exercise EKG stress test is not suggestive for ischemia    Nuclear stress and echo were denied by insurance. Would you clear patient based on regular stress results? Form in Dr. Peng Gotti box.

## 2022-04-12 NOTE — TELEPHONE ENCOUNTER
Pt notified of results and that Dr Chris Chambers would like to proceed with Echo if insurance approves. Reprinted Echo order and given to scheduling.

## 2022-04-12 NOTE — TELEPHONE ENCOUNTER
Dyspnea on exertion and SOB  Abnormal EKG  All documented in office note  Possible CHF  Need to assess LV systolic funciton, ?structural heart disease    Echo is needed

## 2022-04-12 NOTE — TELEPHONE ENCOUNTER
Result note from regular treadmill stress test  Anh Aragon MD  [facility] Clinical Support Pool  Please inform patient that stress test result was unremarkable with no evidence for ischemia. If chest pain persists/recurs, patient should call office. Please see Dr. Reyes Casco note below!

## 2022-04-13 DIAGNOSIS — Z01.818 PREOP EXAMINATION: Primary | ICD-10-CM

## 2022-04-23 NOTE — PROGRESS NOTES
[facility] [address]  Pulmonary medicine clinic initial consult note Pedro Ramirez is an established patient of my sleep medicine clinic at [facility]. She came for Pulmonary medicine evaluation today. She was seen by me for the last time on 2022. She came for a preoperative pulmonary evaluation. Patient: Cristian Agarwal  : 1975\      Lung Nodule Screening     []? Qualifies    []? Does not qualify   []? Declined    []? Completed        Chief complaint/[place]: Cristian Agarwal is a 51 y.o. old female came for preoperative pulmonary evaluation for planned bariatric surgery for further evaluation regarding her sleep apnea with referral from Ms. Augusto Rice PA-C/[address] [facility]. She was diagnosed with bronchial asthma during her childhood. She is taking the Singulair 10 mg p.o. nightly. She rarely uses albuterol HFA 2 puffs every 6 hourly as needed. She used to be on treatment with Advair discus in the past.  She quit taking Advair after starting on Singulair 10 mg p.o. nightly. She denies any shortness of breath. Her current functional status 2 to 3 miles on level ground. She can climb up to 4 flights of stairs. However, her functional capacity is limited while climbing steps due to pain in her knees not because of the shortness of breath. He denies any orthopnea or paroxysmal nocturnal dyspnea. She is currently using a PAP device for her sleep apnea. On today's questioning:  She denies cough or expectoration. She denies hemoptysis. She denies fever or chills. She denies recent hospitalizations or emergency room visits. She is currently not using any maintenance inhaler. She takes Singulair 10 mg p.o. nightly. She is using her rescue inhaler rarely. She denies recent loss of weight or appetite changes. She denies recent decline in functional status.      She is currently not using any oxygen supplementation at rest, exercise or during sleep/at night time. She denies orthopnea or paroxysmal nocturnal dyspnea. She was never diagnosed with pulmonary diseases I.e COPD,Pulmonary fibrosis, Sarcoidosis, pulmonary embolism,pulmonary hypertension or pleural effusion/s in the past.  She denies any hemoptysis. She was never diagnosed with pulmonary tuberculosis in the past. She denies any recent exposure to any patients with tuberculosis. She denies any recent travel to endemic places of tuberculosis. Her PPD test is- Negative in 1997/1998      Asthma control (Severity) questionnaire:  Asthma symptoms:  > 2 times per week -> No   Night time awakenings: > 2 times per month -> No  Use of short acting beta agonist for symptoms control (Other than pre exercise use) :> 2times per week  -> No  Interference with normal activity  -> none  Lung function: Fev1 >60% Predicted  -> Yes  Number of exacerbations per year: > 2 -> No      Social History:  Occupation:  She is current working:  Yes  Type of profession: She is currently working as a  for [employer] and [employer]. Social History     Tobacco Use    Smoking status: Never Smoker    Smokeless tobacco: Never Used   Vaping Use    Vaping Use: Never used   Substance Use Topics    Alcohol use: Yes     Comment: socially    Drug use: No       She ever diagnosed with COVID-19 infection in the past:Yes. She was diagnosed with COVID-19 infection in November 2020. She was evaluated 2 times in the emergency room at [facility] on ninth and 29 November 2020. She was managed as outpatient and recovered.   She was never hospitalized for COVID-19 infection in 2020    History of recreational or IV drug use in the past:NO  History of Alcohol use: No.       History of exposure to coal mines/coal dust: NO  History of exposure to foundry dust/welding: NO  History of exposure to quarry/silica/sandblasting: NO  History of exposure to asbestos/working with breaks/ships: NO  History of exposure to farm dust: NO  History of recent travel to long distances: NO  History of exposure to birds, pigeons, or chickens in the past:NO  Pet animals at home:No    History of pulmonary embolism in the past: No           History of DVT in the past: No                       Review of Systems:   General/Constitutional: she gained 3lbs of weight from the last visit. No fever or chills. HENT: Negative. Eyes: Negative. Upper respiratory tract: No nasal stuffiness or post nasal drip. Lower respiratory tract/ lungs: No cough or sputum production. Cardiovascular: No palpitations or chest pain. Gastrointestinal: No nausea or vomiting. Neurological: No focal neurologiacal weakness. Extremities: No edema. Musculoskeletal: No complaints. Genitourinary: No complaints. Hematological: Negative. Psychiatric/Behavioral: Negative. Skin: No itching.       Current Medications:          Past Medical History:   Diagnosis Date    Allergic rhinitis     Anxiety     Asthma     Depression     Diabetes ([facility] [address]     Psychiatric problem     depression    Sleep apnea        Past Surgical History:   Procedure Laterality Date    CARPAL TUNNEL RELEASE       SECTION      HYSTERECTOMY      KNEE ARTHROSCOPY         Allergies   Allergen Reactions    Latex Rash    Keflex [Cephalexin] Rash    Betadine [Povidone Iodine]     Neosporin [Neomycin-Polymyxin-Gramicidin]     Pcn [Penicillins]     Rocephin [Ceftriaxone] Rash       Current Outpatient Medications   Medication Sig Dispense Refill    PARoxetine (PAXIL) 10 MG tablet Take 20 mg by mouth every morning      calcium carbonate (OSCAL) 500 MG TABS tablet Take 500 mg by mouth daily      melatonin 3 MG TABS tablet Take 1 tablet by mouth nightly as needed (Insomnia) 30 tablet 11    Multiple Vitamins-Minerals (THERAPEUTIC MULTIVITAMIN-MINERALS) tablet Take 1 tablet by mouth daily      CPAP Machine MISC by Does not apply route Please check patient's CPAP machine for proper functioning by [employer]. She was prescribed with a CPAP with a pressure of 11 cm of water with room air. 1 each 0    traZODone (DESYREL) 150 MG tablet Take 150 mg by mouth nightly      Dulaglutide (TRULICITY) 1.94 LY/4.9ZV SOPN Inject 0.75 mg into the skin once a week Taking 1.5      omeprazole (PRILOSEC) 40 MG delayed release capsule Take 1 capsule by mouth daily      oxybutynin (DITROPAN-XL) 5 MG extended release tablet Take 1 tablet by mouth daily       atorvastatin (LIPITOR) 40 MG tablet TAKE ONE TABLET BY MOUTH DAILY 30 tablet 3    metFORMIN (GLUCOPHAGE) 1000 MG tablet TAKE ONE TABLET BY MOUTH TWICE A DAY WITH MEALS (Patient taking differently: See Admin Instructions 500 mg in AM, 1000mg in PM.) 60 tablet 3    Blood Glucose Monitoring Suppl (ACCU-CHEK ROD PLUS) w/Device KIT Check blood sugar once daily 1 kit 0    glucose blood VI test strips (ACCU-CHEK ROD PLUS) strip Check blood sugar once daily 100 each 3    Lancets MISC Use to test blood glucose level 1 time per day. Diagnosis: E11.9 100 each 1    Cetirizine HCl (ZYRTEC ALLERGY) 10 MG CAPS Take 1 tablet by mouth daily      montelukast (SINGULAIR) 10 MG tablet Take 10 mg by mouth nightly      rOPINIRole (REQUIP) 0.25 MG tablet Take 4 mg by mouth nightly       albuterol sulfate  (90 BASE) MCG/ACT inhaler Inhale 2 puffs into the lungs every 6 hours as needed for Wheezing       No current facility-administered medications for this visit.        Family History   Problem Relation Age of Onset    Thyroid Disease Mother     Depression Mother     Asthma Mother     Arthritis Mother     Cancer Father     Arthritis Father     Diabetes Sister     Arthritis Sister     Cancer Maternal Uncle     Arthritis Paternal Grandfather     Cancer Paternal Grandfather     Arthritis Paternal Grandmother     Arthritis Maternal Grandfather     Arthritis Maternal Grandmother     Asthma Maternal Grandmother            Physical Exam:    VITALS:  /70 (Site: Left Upper Arm, Position: Sitting, Cuff Size: Large Adult)   Pulse 89   Temp 97.5 °F (36.4 °C)   Ht 5' 5\" (1.651 m)   Wt 276 lb (125.2 kg)   SpO2 99% Comment: on r/a  BMI 45.93 kg/m²   Nursing note and vitals reviewed. Constitutional: Patient appears moderately built and moderately nourished. No distress. Patient is oriented to person, place, and time. HENT:   Head: Normocephalic and atraumatic. Right Ear: External ear normal.   Left Ear: External ear normal.   Mouth/Throat: Oropharynx is clear and moist.  No oral thrush. No stridor heard  Eyes: Conjunctivae are normal. Pupils are equal, round, and reactive to light. No scleral icterus. Neck: Neck supple. No JVD present. No tracheal deviation present. Cardiovascular: Normal rate, regular rhythm, normal heart sounds. No murmur heard. Pulmonary/Chest: Effort normal and breath sounds normal. No stridor. No respiratory distress. No wheezes. No rales. Patient exhibits no tenderness. Abdominal: Soft. Patient exhibits no distension. No tenderness. Musculoskeletal: Normal range of motion. Extremities: Patient exhibits no edema and no tenderness. Lymphadenopathy:  No cervical adenopathy. Neurological: Patient is alert and oriented to person, place, and time. Skin: Skin is warm and dry. Patient is not diaphoretic. Psychiatric: Patient  has a normal mood and affect. Patient behavior is normal.     Neck Circumference -   16  Mallampati - 3    Diagnostic Data:    Radiological Data:    Chest x-ray PA and lateral views performed on 8 April 2022:  Sat Apr 9, 2022 12:11 PM   PROCEDURE: XR CHEST (2 VW)   CLINICAL INFORMATION: Pre-op testing. 1. No interval change since previous study dated 5 December 2020, no acute cardiopulmonary disease. .       Pulmonary function tests: 10 May 2022            Echocardiogram:  5/2/2022   Narrative & Impression  Transthoracic Echocardiography Report (TTE)  Conclusions      Summary   Normal left ventricle size and systolic function. Ejection fraction was   estimated at 60-65%. There were no regional left ventricular wall motion   abnormalities and wall thickness was within normal limits. Trace mitral regurgitation. Mild tricuspid regurgitation. Signature      ----------------------------------------------------------------   Electronically signed by Ashley Bridges MD (Interpreting   physician) on 05/03/2022 at 04:12 PM   ----------------------------------------------------------------  Right Ventricle   The right ventricular size was normal with normal systolic function and   wall thickness. ARISCAT risk score points: 15minutes- low risk if her surgery duration is <2hours to moderate risk if her surgery duration is more than 2hours. Assessment:  -Mild intermittent bronchial asthma. Under control with current therapy. No recent exacerbations  -Severe obstructive sleep apnea on treatment with a CPAP pressure of 11cm H20 - she is using her CPAP device with excellent compliance to >4 hours of therapy. Her respiratory events were under good control with current therapy. Her clinical symptoms improved. She is benefiting from current CPAP therapy and would like to continue the therapy.  -Restless leg syndrome on treatment with Requip 4 mg p.o. nightly.  She is currently following with her family physician. Jessy Michael was found to have a acceptable level of serum ferritin level.  -Hx of Hypersomnia ( Excessive daytime sleepiness) may be due to untreated obstructive sleep apnea- improved. -Depression currently on treatment medications under moderate control  -Allergic rhinitis with history of hayfever.  She is currently on treatment with Singulair 10 mg p.o.nightly. Jessy Michael is also taking over-the-counter antihistaminics including Zyrtec/Claritin.   -GERD on treatment with PPI  -Anxiety disorder on treatment medications.  -Insomnia with sleep maintenance. She is currently on treatment with the trazodone 150 mg p.o. nightly. She is also taking melatonin along with Paxil. She discontinued taking hydroxyzine at nighttime. Recommendations/Plan:  -Patient's pulmonary status is at optimal control with current therapy  -Patient can go for planned surgery from pulmonary point of view. -Patient is at low to moderate risk (low risk if her surgery duration is <2hours to moderate risk if her surgery duration is more than 2hours) for main and post operative pulmonary complications, if general anesthesia is used as anesthesia of choice for planned procedure. The risk also includes prolonged mechanical ventilation.  -Patient needs vigorous bronchodilator therapy with albuterol 2.5mg via nebulizer Q6h and Q2h prn before, during and after procedure. Patient need to continue rest of her inhalers as prescribed.  -Patient about increased risk and she agreed to take the risk and verbalizes understanding.  -Patient need to be educated about incentive spirometry before surgery to facilitate it's use in the post operative period Q4h as tolerated. -She was advised to continue to take Singulair 10 mg p.o. nightly with good compliance. She was detailed about mechanism of action of drug along with associated side effects along with increased risk for worsening of depression. She agreed to take the risk and medication. She verbalizes understanding.  -Continue albuterol HFA 2 puffs every 6 hourly as needed.  -Patient's obstructive sleep apnea is at optimal control with current therapy  -She need to kept on her CPAP machine with current recommended pressure/s during and after planned procedure if she requires sedation. Avoid general anesthesia if possible for planned procedure. Patient respiratory status need to be monitored closely in the post operative period by a monitored bed in a hospital setting.   She was informed about my recommendations. She verbalizes understanding.  -Patient advised to continue current inhaler and keep good compliance. Patient verbalizes understanding.  -Patient educated to update his pneumococcal vaccine with family physician and take influenza vaccine in coming season with out fail. Patient verbalizes understanding.  -Jeevan Bhatt educated about environmental safety precautions she need to practice to prevent exacerbation ofher Asthma. Liliane Griggstchett verbalizes understanding.   -Jeevan Bhatt was advised  to keep her scheduled follow up at [facility] for pulmonary disease) sleep clinic for further evaluation and management of sleep apnea. -Schedule patient for follow up with my clinic in 12months for a clinical reevaluation with a spirometry before clinic visit. Patient advised to make early appointment if needed.  -Patient educated about my impression and plan. Patient verbalizes understanding.      -I personally reviewed and updated the Past medical hx, Past surgical hx,Social hx, Family hx, Medications, Allergies in the discrete data section of the patient chart. I also reviewed pertaining labs and Pulmonary medicine,Sleep medicine related, Pathological, Microbiological and Radiological investigations. Total time spent interviewing the patient, evaluating lab data and X-ray data and processing orders was 45 Minutes. I personally spent more than 50% of the appointment time face to face with the patient providing counseling and coordinating the patient's care.

## 2022-04-27 NOTE — PROGRESS NOTES
Assessment: Patient is a 51 y.o. female seen for month six  follow up MNT visit for  pre op bariatric surgery desires sleeve    Vitals from current and previous visits:      Vitals 2/69/7101   SYSTOLIC 657   DIASTOLIC 72   Site Right Upper Arm   Position Sitting   Cuff Size Large Adult   Pulse 88   Temp 97.2   Resp 18   SpO2    Weight 273 lb 3.2 oz   Height 5' 4\"   Body mass index 46.89 kg/m2   Waist (Inches)    Neck circumference (Inches)      Initial weight at start of Weight Management Program was: 281 lbs     Faizan Crenshaw gained 7 lbs over the last month  -Weight goal: lose weight.     -Nutritionally relevant labs: Initial labs-  HA1C today was 6.9%, Vitamin D-40, Iron levels WNL. B1-130  Lab Results   Component Value Date/Time    LABA1C 6.9 (H) 10/04/2021 11:07 AM    LABA1C 6.5 (H) 11/16/2017 11:04 AM    LABA1C 8.6 08/10/2017 04:24 PM    GLUCOSE 140 (H) 10/04/2021 11:06 AM    GLUCOSE 184 (H) 12/05/2020 01:20 PM    CHOL 131 10/04/2021 11:06 AM    HDL 59 10/04/2021 11:06 AM    LDLCALC 53 10/04/2021 11:06 AM    TRIG 97 10/04/2021 11:06 AM     Dr Bjorn Mack Clearance obtained   DARIEN consult  completed-  Had follow up with pulmonary- requires PFTs and pulmonary clearance yet- scheduled in May  Requires Cardiology clearance d/t insurance plan- ECHO scheduled 5/2/22  Pt had EGD done  Pt has counselor called \"[facility]\"and is seen weekly    - Is patient taking daily Multivitamin:  Does take a MVI-  taking Women's gummies MVI two per day- Cleven [employer] brand    Door [employer] and/or  [employer] full time hours. Sets own hours now- usually doesn't start working till ~ 12p-8p     Kids ages 15 and 15   Goes to bed between 10-11pm  Will wake up at 6:30am     -Food Recall:    - Pt no longer drinking milk due to lactose intolerance. Reports stress of sister being sick patient states gained weight and had been grabbing things on the go- fast food.   Also drinking Equate Protein Drinks ~ 10 grams protein each  Reviewed with patient other options for protein shakes recommended ~ 30 grams protein per shake with 1 gram sugar  Main Beverages:  drinking tea\"Smooth Move\" 8 oz with splenda in evening, up 6-8  bottles water a day  ~  1 cup coffee /day with artificial sweetener (splenda) and powder creamer in morning, cut all diet pop      -Impression of Dietary Intake: Pt  is working towards avoiding high fat/high sugar foods. Pt  is working towards  including protein at meals and snacks. Exercise:    -Physical Activity is:  Doing Chair exercises ~ 20 minutes at a time  Pt encouraged to continue regular physical activity. Exercise log scanned into chart      Nutrition Diagnosis: Overweight/Obesity related to currently undergoing MNT as evidenced by BMI of 46.8  Intervention:  Patient has attended support groups via [employer] two. Pt has completed six months of medically supervised weight loss. Reviewed out of pocket cost for bariatric vitamins and protein powder. Samples given. Questions answered. .    Patient Instructions   Goals:  1. Remember you will need to separate you liquids from solids after surgery. No liquids 30 minutes before your meals and no liquids 30 minutes after your meals. 2. You will need to take your time with meals after surgery and begin practicing this now - chew foods really well and take 20-30 minutes at each meal.  3.  Aim for consistent and set meal times- choose lean protein foods first, followed by vegetables and fruit, then starch food last if still hungry. Consistency is key following bariatric surgery. 4.  Continue regular physical activity- recommend stay as active as you can leading up to surgery and after surgery this will remain important for your weight loss efforts             -Followup visit: pre op teaching class  Requires PFTs and a Pulmonary Clearance.   Requires Cardiology clearance per insurance requirement     Carlos Johnson RD, LD,   Dietitian- [employer]  [facility] [address]

## 2022-04-29 ENCOUNTER — OFFICE VISIT (OUTPATIENT)
Dept: BARIATRICS/WEIGHT MGMT | Age: 47
End: 2022-04-29

## 2022-04-29 ENCOUNTER — OFFICE VISIT (OUTPATIENT)
Dept: BARIATRICS/WEIGHT MGMT | Age: 47
End: 2022-04-29
Payer: COMMERCIAL

## 2022-04-29 VITALS
DIASTOLIC BLOOD PRESSURE: 72 MMHG | HEIGHT: 64 IN | BODY MASS INDEX: 46.64 KG/M2 | SYSTOLIC BLOOD PRESSURE: 122 MMHG | RESPIRATION RATE: 18 BRPM | TEMPERATURE: 97.2 F | WEIGHT: 273.2 LBS | HEART RATE: 88 BPM

## 2022-04-29 DIAGNOSIS — G47.33 OSA (OBSTRUCTIVE SLEEP APNEA): ICD-10-CM

## 2022-04-29 DIAGNOSIS — E78.5 HYPERLIPIDEMIA, UNSPECIFIED HYPERLIPIDEMIA TYPE: ICD-10-CM

## 2022-04-29 DIAGNOSIS — E66.01 MORBID OBESITY (HCC): Primary | ICD-10-CM

## 2022-04-29 DIAGNOSIS — E11.9 TYPE 2 DIABETES MELLITUS WITHOUT COMPLICATION, WITHOUT LONG-TERM CURRENT USE OF INSULIN (HCC): ICD-10-CM

## 2022-04-29 DIAGNOSIS — G25.81 RLS (RESTLESS LEGS SYNDROME): ICD-10-CM

## 2022-04-29 DIAGNOSIS — E66.01 MORBID OBESITY WITH BMI OF 45.0-49.9, ADULT (HCC): Primary | ICD-10-CM

## 2022-04-29 DIAGNOSIS — J45.909 UNCOMPLICATED ASTHMA, UNSPECIFIED ASTHMA SEVERITY, UNSPECIFIED WHETHER PERSISTENT: ICD-10-CM

## 2022-04-29 DIAGNOSIS — F41.9 ANXIETY: ICD-10-CM

## 2022-04-29 DIAGNOSIS — F32.A DEPRESSION, UNSPECIFIED DEPRESSION TYPE: ICD-10-CM

## 2022-04-29 PROCEDURE — G8417 CALC BMI ABV UP PARAM F/U: HCPCS | Performed by: SURGERY

## 2022-04-29 PROCEDURE — 3046F HEMOGLOBIN A1C LEVEL >9.0%: CPT | Performed by: SURGERY

## 2022-04-29 PROCEDURE — 99213 OFFICE O/P EST LOW 20 MIN: CPT | Performed by: SURGERY

## 2022-04-29 PROCEDURE — 1036F TOBACCO NON-USER: CPT | Performed by: SURGERY

## 2022-04-29 PROCEDURE — 2022F DILAT RTA XM EVC RTNOPTHY: CPT | Performed by: SURGERY

## 2022-04-29 PROCEDURE — G8427 DOCREV CUR MEDS BY ELIG CLIN: HCPCS | Performed by: SURGERY

## 2022-04-29 RX ORDER — PAROXETINE 10 MG/1
20 TABLET, FILM COATED ORAL EVERY MORNING
COMMUNITY
End: 2022-07-26

## 2022-05-01 ASSESSMENT — ENCOUNTER SYMPTOMS
TROUBLE SWALLOWING: 0
COLOR CHANGE: 0
PHOTOPHOBIA: 0
EYE PAIN: 0
ANAL BLEEDING: 0
EYE REDNESS: 0
CHEST TIGHTNESS: 0
SORE THROAT: 0
VOMITING: 0
SINUS PRESSURE: 0
EYE ITCHING: 0
RHINORRHEA: 0
COUGH: 0
APNEA: 0
BACK PAIN: 0
SHORTNESS OF BREATH: 0
ABDOMINAL PAIN: 0
VOICE CHANGE: 0
EYE DISCHARGE: 0
NAUSEA: 0
ALLERGIC/IMMUNOLOGIC NEGATIVE: 1
FACIAL SWELLING: 0
CONSTIPATION: 0
ABDOMINAL DISTENTION: 0
CHOKING: 0
BLOOD IN STOOL: 0
DIARRHEA: 0
WHEEZING: 0
STRIDOR: 0
RECTAL PAIN: 0

## 2022-05-01 NOTE — PROGRESS NOTES
Jose Wakefieldpatrizia (:  1975)     ASSESSMENT:  1.  Morbid obesity (BMI 46)  2. Sleep apnea  3. Anxiety  4. Depression  5. Diabetes mellitus  6. Asthma  7. Multinodular goiter  8. Restless leg syndrome  9. Hyperlipidemia    PLAN:  1. Discussion again about the pros and cons of weight loss surgery. The risks benefits and alternatives to laparoscopic adjustable band, gastric sleeve and gastric Jadiel-en-Y bypass were discussed in detail. The pros and cons of robotic assisted, laparoscopic and open techniques were discussed. 2.  Behavior modification discussed again in regards to dietary habits. 3.  Nutritional education occurred during visit. Follow up with dietitian for further evaluation. 4.  Options for medical management of morbid obesity again discussed. 5.  Improvement in fitness/exercise again discussed with patient and the need for this with/without surgery. 6.  Medical necessity letter from PCP. 7.  Follow-up in one month at weight management program at [address] [place] [address]. 8.  Signs and symptoms reviewed with patient that would be concerning and need her to return to office for re-evaluation. Patient states she will call if she has questions or concerns. 9. Multivitamin  10. Psychology evaluation completed. Following up as needed. 11. EGD completed. H. pylori negative. No significant hiatal hernia. 12.  Encouraged support groups  13. Send LOMN    Patient states that she has not been able to lose enough adequate excess body weight with medical management only and would like to proceed with a robotic sleeve gastrectomy for further weight loss. More than 25 minutes spent with patient today. Greater than 50% of the time was involved counseling, educaton and coordinating care.     SUBJECTIVE/OBJECTIVE:    Chief Complaint   Patient presents with    Follow-up     month 6 of 6; desires sleeve      HPI  Nicki Mireles is a 48-year-old female who presents for follow-up at the weight management program secondary to her morbid obesity. BMI 46. Current weight 273 pounds. Multiple comorbid conditions. She has not been able to lose enough adequate excess body weight and is wishing to proceed with a robotic sleeve gastrectomy for further weight loss. Most recent hemoglobin A1c 6.9. Completed psychology evaluation. Attended support groups. Working with the dietitian. Continuing to work on food selection and portion control. Completed upper endoscopy. H. pylori negative. No significant hiatal hernia. Following with pulmonary service for sleep apnea. Next appointment scheduled and made. Following with cardiology as well. Clearance in process. Echocardiogram beginning of next week. Taking multivitamin. Trying to become more active. Doing chair exercises. Logging exercises. Denies chest or abdominal pain. No hematochezia or melena. No new urinary complaints. She states she is excited to proceed with surgical intervention to continue and work towards her weight loss goals. Review of Systems   Constitutional: Negative for activity change, appetite change, chills, diaphoresis, fatigue, fever and unexpected weight change. HENT: Negative for congestion, dental problem, drooling, ear discharge, ear pain, facial swelling, hearing loss, mouth sores, nosebleeds, postnasal drip, rhinorrhea, sinus pressure, sneezing, sore throat, tinnitus, trouble swallowing and voice change. Eyes: Negative for photophobia, pain, discharge, redness, itching and visual disturbance. Respiratory: Negative for apnea, cough, choking, chest tightness, shortness of breath, wheezing and stridor. Cardiovascular: Negative for chest pain, palpitations and leg swelling. Gastrointestinal: Negative for abdominal distention, abdominal pain, anal bleeding, blood in stool, constipation, diarrhea, nausea, rectal pain and vomiting. Endocrine: Negative.     Genitourinary: Negative for decreased urine volume, difficulty urinating, dyspareunia, dysuria, enuresis, flank pain, frequency, genital sores, hematuria, menstrual problem, pelvic pain, urgency, vaginal bleeding, vaginal discharge and vaginal pain. Musculoskeletal: Negative for arthralgias, back pain, gait problem, joint swelling, myalgias, neck pain and neck stiffness. Skin: Negative for color change, pallor, rash and wound. Allergic/Immunologic: Negative. Neurological: Negative for dizziness, tremors, seizures, syncope, facial asymmetry, speech difficulty, weakness, light-headedness, numbness and headaches. Hematological: Negative for adenopathy. Does not bruise/bleed easily. Psychiatric/Behavioral: Negative for agitation, behavioral problems, confusion, decreased concentration, dysphoric mood, hallucinations, self-injury, sleep disturbance and suicidal ideas. The patient is not nervous/anxious and is not hyperactive. Past Medical History:   Diagnosis Date    Allergic rhinitis     Anxiety     Asthma     Depression     Diabetes ([facility] [address]     Psychiatric problem     depression    Sleep apnea        Past Surgical History:   Procedure Laterality Date    CARPAL TUNNEL RELEASE       SECTION      HYSTERECTOMY      KNEE ARTHROSCOPY         Current Outpatient Medications   Medication Sig Dispense Refill    PARoxetine (PAXIL) 10 MG tablet Take 20 mg by mouth every morning      calcium carbonate (OSCAL) 500 MG TABS tablet Take 500 mg by mouth daily      melatonin 3 MG TABS tablet Take 1 tablet by mouth nightly as needed (Insomnia) 30 tablet 11    Multiple Vitamins-Minerals (THERAPEUTIC MULTIVITAMIN-MINERALS) tablet Take 1 tablet by mouth daily      CPAP Machine MISC by Does not apply route Please check patient's CPAP machine for proper functioning by [employer]. She was prescribed with a CPAP with a pressure of 11 cm of water with room air.  1 each 0    traZODone (DESYREL) 150 MG tablet Take 150 mg by mouth nightly      Dulaglutide (TRULICITY) 0.53 ZF/2.6YJ SOPN Inject 0.75 mg into the skin once a week Taking 1.5      omeprazole (PRILOSEC) 40 MG delayed release capsule Take 1 capsule by mouth daily      oxybutynin (DITROPAN-XL) 5 MG extended release tablet Take 1 tablet by mouth daily       atorvastatin (LIPITOR) 40 MG tablet TAKE ONE TABLET BY MOUTH DAILY 30 tablet 3    metFORMIN (GLUCOPHAGE) 1000 MG tablet TAKE ONE TABLET BY MOUTH TWICE A DAY WITH MEALS (Patient taking differently: See Admin Instructions 500 mg in AM, 1000mg in PM.) 60 tablet 3    Blood Glucose Monitoring Suppl (ACCU-CHEK ROD PLUS) w/Device KIT Check blood sugar once daily 1 kit 0    glucose blood VI test strips (ACCU-CHEK ROD PLUS) strip Check blood sugar once daily 100 each 3    Lancets MISC Use to test blood glucose level 1 time per day. Diagnosis: E11.9 100 each 1    Cetirizine HCl (ZYRTEC ALLERGY) 10 MG CAPS Take 1 tablet by mouth daily      montelukast (SINGULAIR) 10 MG tablet Take 10 mg by mouth nightly      rOPINIRole (REQUIP) 0.25 MG tablet Take 4 mg by mouth nightly       albuterol sulfate  (90 BASE) MCG/ACT inhaler Inhale 2 puffs into the lungs every 6 hours as needed for Wheezing       No current facility-administered medications for this visit.        Allergies   Allergen Reactions    Latex Rash    Keflex [Cephalexin] Rash    Betadine [Povidone Iodine]     Neosporin [Neomycin-Polymyxin-Gramicidin]     Pcn [Penicillins]     Rocephin [Ceftriaxone] Rash       Family History   Problem Relation Age of Onset    Thyroid Disease Mother     Depression Mother     Asthma Mother     Arthritis Mother     Cancer Father     Arthritis Father     Diabetes Sister     Arthritis Sister     Cancer Maternal Uncle     Arthritis Paternal Grandfather     Cancer Paternal Grandfather     Arthritis Paternal Grandmother     Arthritis Maternal Grandfather     Arthritis Maternal Grandmother     Asthma Maternal Grandmother        Social History     Socioeconomic History    Marital status:      Spouse name: Not on file    Number of children: 3    Years of education: Not on file    Highest education level: Not on file   Occupational History    Not on file   Tobacco Use    Smoking status: Never Smoker    Smokeless tobacco: Never Used   Vaping Use    Vaping Use: Never used   Substance and Sexual Activity    Alcohol use: Yes     Comment: socially    Drug use: No    Sexual activity: Yes   Other Topics Concern    Not on file   Social History Narrative    Not on file     Social Determinants of Health     Financial Resource Strain: Low Risk     Difficulty of Paying Living Expenses: Not very hard   Food Insecurity: Food Insecurity Present    Worried About Running Out of Food in the Last Year: Sometimes true    [place] of Food in the Last Year: Sometimes true   Transportation Needs: No Transportation Needs    Lack of Transportation (Medical): No    Lack of Transportation (Non-Medical): No   Physical Activity: Sufficiently Active    Days of Exercise per Week: 5 days    Minutes of Exercise per Session: 30 min   Stress: Stress Concern Present    Feeling of Stress : Rather much   Social Connections: Moderately Integrated    Frequency of Communication with Friends and Family: More than three times a week    Frequency of Social Gatherings with Friends and Family:  Three times a week    Attends Nondenominational Services: More than 4 times per year    Active Member of Clubs or Organizations: No    Attends Club or Organization Meetings: Patient refused    Marital Status:    Intimate Partner Violence: Not At Risk    Fear of Current or Ex-Partner: No    Emotionally Abused: No    Physically Abused: No    Sexually Abused: No   Housing Stability: Low Risk     Unable to Pay for Housing in the Last Year: No    Number of [place] in the Last Year: 1    Unstable Housing in the Last Year: No     Vitals:    04/29/22 1015   BP: 122/72 Site: Right Upper Arm   Position: Sitting   Cuff Size: Large Adult   Pulse: 88   Resp: 18   Temp: 97.2 °F (36.2 °C)   TempSrc: Infrared   Weight: 273 lb 3.2 oz (123.9 kg)   Height: 5' 4\" (1.626 m)     Body mass index is 46.89 kg/m². Wt Readings from Last 3 Encounters:   04/29/22 273 lb 3.2 oz (123.9 kg)   03/24/22 231 lb (104.8 kg)   03/21/22 266 lb 6.4 oz (120.8 kg)     Physical Exam  Vitals reviewed. Constitutional:       General: She is not in acute distress. Appearance: She is well-developed. She is not diaphoretic. HENT:      Head: Normocephalic and atraumatic. Right Ear: External ear normal.      Left Ear: External ear normal.      Nose: Nose normal.   Eyes:      General: No scleral icterus. Right eye: No discharge. Left eye: No discharge. Conjunctiva/sclera: Conjunctivae normal.   Cardiovascular:      Rate and Rhythm: Normal rate and regular rhythm. Heart sounds: Normal heart sounds. Pulmonary:      Effort: Pulmonary effort is normal. No respiratory distress. Breath sounds: Normal breath sounds. No wheezing or rales. Chest:      Chest wall: No tenderness. Abdominal:      General: Bowel sounds are normal. There is no distension. Palpations: Abdomen is soft. There is no mass. Tenderness: There is no abdominal tenderness. There is no guarding or rebound. Musculoskeletal:         General: No tenderness. Normal range of motion. Cervical back: Normal range of motion and neck supple. Skin:     General: Skin is warm and dry. Coloration: Skin is not pale. Findings: No erythema or rash. Neurological:      Mental Status: She is alert and oriented to person, place, and time. Cranial Nerves: No cranial nerve deficit. Psychiatric:         Behavior: Behavior normal.         Thought Content:  Thought content normal.         Judgment: Judgment normal.       Lab Results   Component Value Date    WBC 6.7 10/04/2021    HGB 12.6 10/04/2021 HCT 39.2 10/04/2021    MCV 91.6 10/04/2021     10/04/2021     Lab Results   Component Value Date     10/04/2021    K 4.1 10/04/2021     10/04/2021    CO2 23 10/04/2021     Lab Results   Component Value Date    CREATININE 0.6 10/04/2021     Lab Results   Component Value Date    ALT 14 10/04/2021    AST 15 10/04/2021    ALKPHOS 68 10/04/2021    BILITOT 0.5 10/04/2021     Lab Results   Component Value Date    LIPASE 17.8 11/29/2020       Patient Active Problem List   Diagnosis    Dyslipidemia    Cellulitis of lower extremity  bilateral    Allergic drug reaction    Myalgia  lower wxtremities    Staphylococcal skin disorder    Type 2 diabetes mellitus without complication, without long-term current use of insulin (HCC)    RLS (restless legs syndrome)    Multinodular goiter                        An electronic signature was used to authenticate this note.     --Charmaine Bright MD

## 2022-05-02 ENCOUNTER — HOSPITAL ENCOUNTER (EMERGENCY)
Age: 47
Discharge: HOME | End: 2022-05-02
Attending: STUDENT IN AN ORGANIZED HEALTH CARE EDUCATION/TRAINING PROGRAM
Payer: COMMERCIAL

## 2022-05-02 ENCOUNTER — HOSPITAL ENCOUNTER (OUTPATIENT)
Dept: NON INVASIVE DIAGNOSTICS | Age: 47
Discharge: HOME | End: 2022-05-02
Payer: COMMERCIAL

## 2022-05-02 VITALS
HEIGHT: 64 IN | TEMPERATURE: 98.3 F | OXYGEN SATURATION: 95 % | HEART RATE: 70 BPM | SYSTOLIC BLOOD PRESSURE: 129 MMHG | BODY MASS INDEX: 46.78 KG/M2 | RESPIRATION RATE: 18 BRPM | DIASTOLIC BLOOD PRESSURE: 82 MMHG | WEIGHT: 274 LBS

## 2022-05-02 DIAGNOSIS — Z01.818 PRE-OP EXAM: ICD-10-CM

## 2022-05-02 DIAGNOSIS — S39.012A BACK STRAIN, INITIAL ENCOUNTER: Primary | ICD-10-CM

## 2022-05-02 DIAGNOSIS — R06.09 DOE (DYSPNEA ON EXERTION): ICD-10-CM

## 2022-05-02 DIAGNOSIS — I10 HYPERTENSION, UNSPECIFIED TYPE: ICD-10-CM

## 2022-05-02 DIAGNOSIS — R94.31 ABNORMAL EKG: ICD-10-CM

## 2022-05-02 LAB
ALBUMIN SERPL-MCNC: 4 G/DL (ref 3.5–5.1)
ALP BLD-CCNC: 85 U/L (ref 38–126)
ALT SERPL-CCNC: 30 U/L (ref 11–66)
ANION GAP SERPL CALCULATED.3IONS-SCNC: 12 MEQ/L (ref 8–16)
AST SERPL-CCNC: 19 U/L (ref 5–40)
BACTERIA: ABNORMAL /HPF
BASOPHILS # BLD: 0.8 %
BASOPHILS ABSOLUTE: 0.1 THOU/MM3 (ref 0–0.1)
BILIRUB SERPL-MCNC: 0.4 MG/DL (ref 0.3–1.2)
BILIRUBIN URINE: NEGATIVE
BLOOD, URINE: NEGATIVE
BUN BLDV-MCNC: 9 MG/DL (ref 7–22)
CALCIUM SERPL-MCNC: 9.1 MG/DL (ref 8.5–10.5)
CASTS 2: ABNORMAL /LPF
CASTS UA: ABNORMAL /LPF
CHARACTER, URINE: CLEAR
CHLORIDE BLD-SCNC: 100 MEQ/L (ref 98–111)
CO2: 26 MEQ/L (ref 23–33)
COLOR: YELLOW
CREAT SERPL-MCNC: 0.5 MG/DL (ref 0.4–1.2)
CRYSTALS, UA: ABNORMAL
EKG ATRIAL RATE: 74 BPM
EKG P AXIS: 46 DEGREES
EKG P-R INTERVAL: 182 MS
EKG Q-T INTERVAL: 358 MS
EKG QRS DURATION: 72 MS
EKG QTC CALCULATION (BAZETT): 397 MS
EKG R AXIS: 3 DEGREES
EKG T AXIS: 40 DEGREES
EKG VENTRICULAR RATE: 74 BPM
EOSINOPHIL # BLD: 3.9 %
EOSINOPHILS ABSOLUTE: 0.3 THOU/MM3 (ref 0–0.4)
EPITHELIAL CELLS, UA: ABNORMAL /HPF
ERYTHROCYTE [DISTWIDTH] IN BLOOD BY AUTOMATED COUNT: 14.8 % (ref 11.5–14.5)
ERYTHROCYTE [DISTWIDTH] IN BLOOD BY AUTOMATED COUNT: 47.2 FL (ref 35–45)
GFR SERPL CREATININE-BSD FRML MDRD: > 90 ML/MIN/1.73M2
GLUCOSE BLD-MCNC: 164 MG/DL (ref 70–108)
GLUCOSE URINE: NEGATIVE MG/DL
HCT VFR BLD CALC: 37.2 % (ref 37–47)
HEMOGLOBIN: 11.9 GM/DL (ref 12–16)
IMMATURE GRANS (ABS): 0.07 THOU/MM3 (ref 0–0.07)
IMMATURE GRANULOCYTES: 0.8 %
KETONES, URINE: NEGATIVE
LEUKOCYTE ESTERASE, URINE: ABNORMAL
LV EF: 63 %
LVEF MODALITY: NORMAL
LYMPHOCYTES # BLD: 25.2 %
LYMPHOCYTES ABSOLUTE: 2.2 THOU/MM3 (ref 1–4.8)
MCH RBC QN AUTO: 28.4 PG (ref 26–33)
MCHC RBC AUTO-ENTMCNC: 32 GM/DL (ref 32.2–35.5)
MCV RBC AUTO: 88.8 FL (ref 81–99)
MISCELLANEOUS 2: ABNORMAL
MONOCYTES # BLD: 7.8 %
MONOCYTES ABSOLUTE: 0.7 THOU/MM3 (ref 0.4–1.3)
NITRITE, URINE: NEGATIVE
NUCLEATED RED BLOOD CELLS: 0 /100 WBC
OSMOLALITY CALCULATION: 278 MOSMOL/KG (ref 275–300)
PH UA: 8.5 (ref 5–9)
PLATELET # BLD: 238 THOU/MM3 (ref 130–400)
PMV BLD AUTO: 11 FL (ref 9.4–12.4)
POTASSIUM SERPL-SCNC: 4 MEQ/L (ref 3.5–5.2)
PROTEIN UA: NEGATIVE
RBC # BLD: 4.19 MILL/MM3 (ref 4.2–5.4)
RBC URINE: ABNORMAL /HPF
RENAL EPITHELIAL, UA: ABNORMAL
SEG NEUTROPHILS: 61.5 %
SEGMENTED NEUTROPHILS ABSOLUTE COUNT: 5.4 THOU/MM3 (ref 1.8–7.7)
SODIUM BLD-SCNC: 138 MEQ/L (ref 135–145)
SPECIFIC GRAVITY, URINE: 1.01 (ref 1–1.03)
TOTAL PROTEIN: 7 G/DL (ref 6.1–8)
UROBILINOGEN, URINE: 0.2 EU/DL (ref 0–1)
WBC # BLD: 8.7 THOU/MM3 (ref 4.8–10.8)
WBC UA: ABNORMAL /HPF
YEAST: ABNORMAL

## 2022-05-02 PROCEDURE — 99284 EMERGENCY DEPT VISIT MOD MDM: CPT

## 2022-05-02 PROCEDURE — 96374 THER/PROPH/DIAG INJ IV PUSH: CPT

## 2022-05-02 PROCEDURE — 81001 URINALYSIS AUTO W/SCOPE: CPT

## 2022-05-02 PROCEDURE — 6370000000 HC RX 637 (ALT 250 FOR IP): Performed by: STUDENT IN AN ORGANIZED HEALTH CARE EDUCATION/TRAINING PROGRAM

## 2022-05-02 PROCEDURE — 93010 ELECTROCARDIOGRAM REPORT: CPT | Performed by: INTERNAL MEDICINE

## 2022-05-02 PROCEDURE — 85025 COMPLETE CBC W/AUTO DIFF WBC: CPT

## 2022-05-02 PROCEDURE — 93005 ELECTROCARDIOGRAM TRACING: CPT | Performed by: STUDENT IN AN ORGANIZED HEALTH CARE EDUCATION/TRAINING PROGRAM

## 2022-05-02 PROCEDURE — 6360000002 HC RX W HCPCS: Performed by: STUDENT IN AN ORGANIZED HEALTH CARE EDUCATION/TRAINING PROGRAM

## 2022-05-02 PROCEDURE — 81003 URINALYSIS AUTO W/O SCOPE: CPT

## 2022-05-02 PROCEDURE — 93306 TTE W/DOPPLER COMPLETE: CPT

## 2022-05-02 PROCEDURE — 80053 COMPREHEN METABOLIC PANEL: CPT

## 2022-05-02 RX ORDER — ORPHENADRINE CITRATE 100 MG/1
100 TABLET, EXTENDED RELEASE ORAL ONCE
Status: COMPLETED | OUTPATIENT
Start: 2022-05-02 | End: 2022-05-02

## 2022-05-02 RX ORDER — KETOROLAC TROMETHAMINE 30 MG/ML
15 INJECTION, SOLUTION INTRAMUSCULAR; INTRAVENOUS ONCE
Status: COMPLETED | OUTPATIENT
Start: 2022-05-02 | End: 2022-05-02

## 2022-05-02 RX ORDER — CYCLOBENZAPRINE HCL 10 MG
5 TABLET ORAL 2 TIMES DAILY PRN
Qty: 3 TABLET | Refills: 0 | Status: SHIPPED | OUTPATIENT
Start: 2022-05-02 | End: 2022-05-05

## 2022-05-02 RX ORDER — ACETAMINOPHEN 325 MG/1
650 TABLET ORAL ONCE
Status: COMPLETED | OUTPATIENT
Start: 2022-05-02 | End: 2022-05-02

## 2022-05-02 RX ORDER — LIDOCAINE 4 G/G
1 PATCH TOPICAL ONCE
Status: DISCONTINUED | OUTPATIENT
Start: 2022-05-02 | End: 2022-05-03 | Stop reason: HOSPADM

## 2022-05-02 RX ORDER — LIDOCAINE 50 MG/G
1 PATCH TOPICAL DAILY
Qty: 7 PATCH | Refills: 0 | Status: SHIPPED | OUTPATIENT
Start: 2022-05-02 | End: 2022-05-09

## 2022-05-02 RX ADMIN — ACETAMINOPHEN 650 MG: 325 TABLET ORAL at 21:16

## 2022-05-02 RX ADMIN — ORPHENADRINE CITRATE 100 MG: 100 TABLET, EXTENDED RELEASE ORAL at 21:16

## 2022-05-02 RX ADMIN — KETOROLAC TROMETHAMINE 15 MG: 30 INJECTION, SOLUTION INTRAMUSCULAR; INTRAVENOUS at 21:17

## 2022-05-02 ASSESSMENT — PAIN - FUNCTIONAL ASSESSMENT: PAIN_FUNCTIONAL_ASSESSMENT: 0-10

## 2022-05-02 ASSESSMENT — PAIN DESCRIPTION - LOCATION: LOCATION: BACK;FLANK

## 2022-05-02 ASSESSMENT — PAIN DESCRIPTION - DESCRIPTORS: DESCRIPTORS: SHARP;ACHING

## 2022-05-02 ASSESSMENT — ENCOUNTER SYMPTOMS
EYE PAIN: 0
DIARRHEA: 0
BACK PAIN: 1
ABDOMINAL PAIN: 0
VOMITING: 0
SINUS PAIN: 0
NAUSEA: 0
COUGH: 0
CONSTIPATION: 0
SHORTNESS OF BREATH: 0

## 2022-05-02 ASSESSMENT — PAIN DESCRIPTION - ORIENTATION: ORIENTATION: RIGHT;LEFT

## 2022-05-02 ASSESSMENT — PAIN DESCRIPTION - FREQUENCY: FREQUENCY: CONTINUOUS

## 2022-05-02 ASSESSMENT — PAIN SCALES - GENERAL: PAINLEVEL_OUTOF10: 8

## 2022-05-03 NOTE — ED NOTES
RN medicated pt per [facility] and pt ambulated to restroom at this time to obtain urine sample.       Angie Laurent RN  05/02/22 [id number]

## 2022-05-03 NOTE — ED NOTES
Pt is resting in cot with respirations even and unlabored. Pt voices no concern or need at this time. Call light is within reach. Will continue to monitor.       Clarissa Michelle RN  05/02/22 [id number]

## 2022-05-03 NOTE — ED PROVIDER NOTES
[address] [facility]  EMERGENCY MEDICINE ATTENDING ATTESTATION      Evaluation of Zelda Salas. Case discussed and care plan developed with resident physician. I agree with the resident physician documentation and plan as documented by him, except if my documentation differs. Patient seen, interviewed and examined by me. I reviewed the medical, surgical, family and social history, medications and allergies. I have reviewed the nursing documentation. I have reviewed the patient's vital signs and are normal per my interpretation. Body mass index is 47.03 kg/m². Pulsoxymetry is normal per my interpretation. Brief H&P   Patient c/o right lumbar and left thoracic paraspinal back pain after heavy lifting     Physical exam is notable for well appearing, localized tenderness to right lumbar area, left thoracic paraspinal area, no midline spinal tenderness, no sensory deficits or neurologic deficits      Medical Decision Making   MDM:   Patient is a 70-year-old female with a history of type 2 diabetes, hyperlipidemia, restless leg syndrome who presents with back pain with significant point tenderness in the paraspinal region consistent with muscle spasm. Laboratory work-up unremarkable and patient with improvement after treatment. Plan:    Discharged with muscle relaxer, Tylenol, lidocaine patch   Follow-up with PCP   Return precautions    Please see the resident physician completed note for final disposition except as documented on this attestation. I have reviewed and interpreted all available lab, radiology and ekg results available at the moment. Diagnosis, treatment and disposition plans were discussed and agreed upon by patient. This transcription was electronically signed. It was dictated by use of voice recognition software and electronically transcribed. The transcription may contain errors not detected in proofreading.      I performed direct supervision and was present for the critical portion following procedures: None  Critical care time on this case: None    Electronically signed by Israel Beckett MD on 5/3/22 at 1:30 AM EDT        Israel Beckett MD  05/03/22 [id number]

## 2022-05-03 NOTE — ED PROVIDER NOTES
[address] [address]          Pt Name: Cammie Umanzor  MRN: [id number]  [place] 1975  Date of evaluation: 5/2/2022  Treating Resident Physician: Princess Mcneil MD  Supervising Physician: MD Kim Nuno       Chief Complaint   Patient presents with    Flank Pain    Back Pain     History obtained from the patient. HISTORY OF PRESENT ILLNESS    HPI  Cammie Umanzor is a 51 y.o. female who presents to the emergency department for evaluation of back pain. Patient states around 3:30 PM she began having left upper back pain as well as right lower back pain. She states that she works as a [employer]  and yesterday was lifting heavy cases of pop. She states that it feels like an aching pain and has been constant since starting. She states she has tried a heating pad without significant relief. She has not tried any other therapies. She denies any falls or trauma. She denies any issues like this in the past.  She denies any history of kidney stones. Denies any dysuria. Denies any headache fever chills cough chest pain shortness of breath abdominal pain nausea vomiting diarrhea constipation leg swelling. Patient denies any new Headache, Fever, Chills, Cough, Chest pain, Shortness of breath, Abdominal pain, Nausea, Vomiting, Diarrhea, Constipation and Leg swelling. The patient has no other acute complaints at this time. REVIEW OF SYSTEMS   Review of Systems   Constitutional: Negative for chills and fever. HENT: Negative for ear pain and sinus pain. Eyes: Negative for pain. Respiratory: Negative for cough and shortness of breath. Cardiovascular: Negative for chest pain and leg swelling. Gastrointestinal: Negative for abdominal pain, constipation, diarrhea, nausea and vomiting. Genitourinary: Negative for dysuria and flank pain. Musculoskeletal: Positive for back pain. Negative for neck pain. Skin: Negative for wound. Neurological: Negative for headaches. Psychiatric/Behavioral: Negative for confusion. PAST MEDICAL AND SURGICAL HISTORY     Past Medical History:   Diagnosis Date    Allergic rhinitis     Anxiety     Asthma     Depression     Diabetes ([address]     Psychiatric problem     depression    Sleep apnea      Past Surgical History:   Procedure Laterality Date    CARPAL TUNNEL RELEASE       SECTION      HYSTERECTOMY      KNEE ARTHROSCOPY           MEDICATIONS     Current Facility-Administered Medications:     lidocaine 4 % external patch 1 patch, 1 patch, TransDERmal, Once, Merline Sheffield, MD, 1 patch at 22 [phone]    Current Outpatient Medications:     cyclobenzaprine (FLEXERIL) 10 MG tablet, Take 0.5 tablets by mouth 2 times daily as needed for Muscle spasms, Disp: 3 tablet, Rfl: 0    lidocaine (LIDODERM) 5 %, Place 1 patch onto the skin daily for 7 days 12 hours on, 12 hours off., Disp: 7 patch, Rfl: 0    PARoxetine (PAXIL) 10 MG tablet, Take 20 mg by mouth every morning, Disp: , Rfl:     calcium carbonate (OSCAL) 500 MG TABS tablet, Take 500 mg by mouth daily, Disp: , Rfl:     melatonin 3 MG TABS tablet, Take 1 tablet by mouth nightly as needed (Insomnia), Disp: 30 tablet, Rfl: 11    Multiple Vitamins-Minerals (THERAPEUTIC MULTIVITAMIN-MINERALS) tablet, Take 1 tablet by mouth daily, Disp: , Rfl:     CPAP Machine MISC, by Does not apply route Please check patient's CPAP machine for proper functioning by [employer].   She was prescribed with a CPAP with a pressure of 11 cm of water with room air., Disp: 1 each, Rfl: 0    traZODone (DESYREL) 150 MG tablet, Take 150 mg by mouth nightly, Disp: , Rfl:     Dulaglutide (TRULICITY) 4.85 GR/1.8LN SOPN, Inject 0.75 mg into the skin once a week Taking 1.5, Disp: , Rfl:     omeprazole (PRILOSEC) 40 MG delayed release capsule, Take 1 capsule by mouth daily, Disp: , Rfl:     oxybutynin (DITROPAN-XL) 5 MG extended release tablet, Take 1 tablet by mouth daily , Disp: , Rfl:     atorvastatin (LIPITOR) 40 MG tablet, TAKE ONE TABLET BY MOUTH DAILY, Disp: 30 tablet, Rfl: 3    metFORMIN (GLUCOPHAGE) 1000 MG tablet, TAKE ONE TABLET BY MOUTH TWICE A DAY WITH MEALS (Patient taking differently: See Admin Instructions 500 mg in AM, 1000mg in PM.), Disp: 60 tablet, Rfl: 3    Blood Glucose Monitoring Suppl (ACCU-CHEK ROD PLUS) w/Device KIT, Check blood sugar once daily, Disp: 1 kit, Rfl: 0    glucose blood VI test strips (ACCU-CHEK ROD PLUS) strip, Check blood sugar once daily, Disp: 100 each, Rfl: 3    Lancets MISC, Use to test blood glucose level 1 time per day.  Diagnosis: E11.9, Disp: 100 each, Rfl: 1    Cetirizine HCl (ZYRTEC ALLERGY) 10 MG CAPS, Take 1 tablet by mouth daily, Disp: , Rfl:     montelukast (SINGULAIR) 10 MG tablet, Take 10 mg by mouth nightly, Disp: , Rfl:     rOPINIRole (REQUIP) 0.25 MG tablet, Take 4 mg by mouth nightly , Disp: , Rfl:     albuterol sulfate  (90 BASE) MCG/ACT inhaler, Inhale 2 puffs into the lungs every 6 hours as needed for Wheezing, Disp: , Rfl:       SOCIAL HISTORY     Social History     Social History Narrative    Not on file     Social History     Tobacco Use    Smoking status: Never Smoker    Smokeless tobacco: Never Used   Vaping Use    Vaping Use: Never used   Substance Use Topics    Alcohol use: Yes     Comment: socially    Drug use: No         ALLERGIES     Allergies   Allergen Reactions    Latex Rash    Keflex [Cephalexin] Rash    Betadine [Povidone Iodine]     Neosporin [Neomycin-Polymyxin-Gramicidin]     Pcn [Penicillins]     Rocephin [Ceftriaxone] Rash         FAMILY HISTORY     Family History   Problem Relation Age of Onset    Thyroid Disease Mother     Depression Mother     Asthma Mother     Arthritis Mother     Cancer Father     Arthritis Father     Diabetes Sister     Arthritis Sister     Cancer Maternal Uncle     Arthritis Paternal Grandfather     Cancer Paternal Grandfather     Arthritis Paternal Grandmother     Arthritis Maternal Grandfather     Arthritis Maternal Grandmother     Asthma Maternal Grandmother          PREVIOUS RECORDS   Previous records reviewed: Patient was seen earlier today for an echocardiogram.        PHYSICAL EXAM     ED Triage Vitals [05/02/22 2052]   BP Temp Temp Source Pulse Resp SpO2 Height Weight   121/85 98.3 °F (36.8 °C) Oral 73 18 96 % 5' 4\" (1.626 m) 274 lb (124.3 kg)     Initial vital signs and nursing assessment reviewed and vitals are/show: Afebrile, Normotensive, Normocardic and Normal RR. Pulsoximetry is normal per my interpretation. Additional Vital Signs:  Vitals:    05/02/22 2139   BP: 129/82   Pulse: 70   Resp: 18   Temp:    SpO2: 95%       Physical Exam  Constitutional:       General: She is not in acute distress. Appearance: Normal appearance. She is obese. She is not ill-appearing, toxic-appearing or diaphoretic. HENT:      Head: Normocephalic and atraumatic. Right Ear: External ear normal.      Left Ear: External ear normal.   Eyes:      General: No scleral icterus. Right eye: No discharge. Left eye: No discharge. Cardiovascular:      Rate and Rhythm: Normal rate and regular rhythm. Pulmonary:      Effort: Pulmonary effort is normal. No respiratory distress. Breath sounds: Normal breath sounds. No stridor. No wheezing, rhonchi or rales. Chest:      Chest wall: No tenderness. Abdominal:      General: Abdomen is flat. There is no distension. Palpations: Abdomen is soft. Tenderness: There is no abdominal tenderness. There is no right CVA tenderness, left CVA tenderness, guarding or rebound. Musculoskeletal:         General: Tenderness present. Cervical back: Neck supple. Right lower leg: No edema. Left lower leg: No edema.       Comments: Tenderness palpation left mid thoracic paraspinal region  Tenderness palpation right lumbar paraspinal region  No CVA tenderness  No external lesions  No midline thoracic or lumbar spine tenderness palpation  No step-offs or deformities    Skin:     General: Skin is warm and dry. Neurological:      Mental Status: She is alert and oriented to person, place, and time. Mental status is at baseline. Psychiatric:         Mood and Affect: Mood normal.         Behavior: Behavior normal.         Thought Content: Thought content normal.         Judgment: Judgment normal.             MEDICAL DECISION MAKING   Initial Assessment:     50 yo female presenting to ED for back pain after heavy lifting yesterday    Differential diagnoses include but not limited to: Musculoskeletal strain sprain UTI nephrolithiasis pyelonephritis AAA ACS arrhythmia electrolyte abnormality     Plan:       EKG  Labs  Toradol  Tylenol  Norflex  Lidocaine patch  Declined trigger point injections  Discharge        Patient is a 51 y.o. female who was seen and evaluated in the emergency department for back pain. Patient's symptoms seem consistent with musculoskeletal back pain that likely arose from her job lifting yesterday. She had some tenderness that was paraspinal and nothing that was midline. We did check labs which was unremarkable. Urine was unremarkable and she had no urinary symptoms. I treated her in the ED with improvement in her pain and she felt comfortable with discharge and follow-up with her PCP. I did offer trigger point junctions but she declined. She had EKG in the ED which showed no signs of acute ischemia. Patient discharged.                 ED RESULTS   Laboratory results:  Labs Reviewed   CBC WITH AUTO DIFFERENTIAL - Abnormal; Notable for the following components:       Result Value    RBC 4.19 (*)     Hemoglobin 11.9 (*)     MCHC 32.0 (*)     RDW-CV 14.8 (*)     RDW-SD 47.2 (*)     All other components within normal limits   COMPREHENSIVE METABOLIC PANEL - Abnormal; Notable for the following components:    Glucose 164 (*)     All other components within normal limits   URINE WITH REFLEXED MICRO - Abnormal; Notable for the following components:    Leukocyte Esterase, Urine TRACE (*)     All other components within normal limits   ANION GAP   GLOMERULAR FILTRATION RATE, ESTIMATED   OSMOLALITY       Radiologic studies results:  No orders to display       ED Medications administered this visit:   Medications   lidocaine 4 % external patch 1 patch (1 patch TransDERmal Patch Applied 5/2/22 2118)   ketorolac (TORADOL) injection 15 mg (15 mg IntraVENous Given 5/2/22 2117)   acetaminophen (TYLENOL) tablet 650 mg (650 mg Oral Given 5/2/22 2116)   orphenadrine (NORFLEX) extended release tablet 100 mg (100 mg Oral Given 5/2/22 2116)         ED COURSE     ED Course as of 05/02/22 2210   Mon May 02, 2022   2133 CBC unremarkableCMP unremarkable [CR]   2200 UA shows trace leukocyte Estrace and few bacteria [CR]   2203 Patient states that her pain is improved. She declines trigger point injections [CR]      ED Course User Index  [CR] Pearl Coronado MD        Strict return precautions and follow up instructions were discussed with the patient prior to discharge, with which the patient agrees. MEDICATION CHANGES     New Prescriptions    CYCLOBENZAPRINE (FLEXERIL) 10 MG TABLET    Take 0.5 tablets by mouth 2 times daily as needed for Muscle spasms    LIDOCAINE (LIDODERM) 5 %    Place 1 patch onto the skin daily for 7 days 12 hours on, 12 hours off. FINAL DISPOSITION     Final diagnoses:   Back strain, initial encounter     Condition: condition: stable  Dispo: Discharge to home      This transcription was electronically signed. Parts of this transcriptions may have been dictated by use of voice recognition software and electronically transcribed, and parts may have been transcribed with the assistance of an ED scribe. The transcription may contain errors not detected in proofreading.   Please refer to my supervising physician's documentation if my documentation differs.     Electronically Signed: Alfonso De La Cruz MD, 05/02/22, 10:10 PM         Alfonso De La Cruz MD  Resident  05/02/22 8810

## 2022-05-04 ENCOUNTER — TELEPHONE (OUTPATIENT)
Dept: CARDIOLOGY CLINIC | Age: 47
End: 2022-05-04

## 2022-05-04 NOTE — TELEPHONE ENCOUNTER
Reviewed  Normal LV systolic function \"pump function\" of the heart  Only mild TR \"leaky valve\", not clinically significant   Stress test was negative for ischemia  Low cardiac risk

## 2022-05-04 NOTE — TELEPHONE ENCOUNTER
Please see My Chart Message:     Kurt Pearson \"Lu\"  to Trixie Unger MD        6:18 PM  Hello. I just read the results from my ECHOCARDIOGRAM. Are the results indicating that I have been cleared for my Bariatric surgery? Any information you can share would be greatly appreciated! Thank you! Summary   Normal left ventricle size and systolic function. Ejection fraction was   estimated at 60-65%. There were no regional left ventricular wall motion   abnormalities and wall thickness was within normal limits. Trace mitral regurgitation. Mild tricuspid regurgitation.       Signature      ----------------------------------------------------------------   Electronically signed by Trixie Unger MD (Interpreting   physician) on 05/03/2022 at 04:12 PM   ----------------------------------------------------------------

## 2022-05-10 ENCOUNTER — HOSPITAL ENCOUNTER (OUTPATIENT)
Dept: PULMONOLOGY | Age: 47
Discharge: HOME | End: 2022-05-10
Payer: COMMERCIAL

## 2022-05-10 DIAGNOSIS — Z01.818 PRE-OP TESTING: ICD-10-CM

## 2022-05-10 PROCEDURE — 94010 BREATHING CAPACITY TEST: CPT

## 2022-05-10 PROCEDURE — 94726 PLETHYSMOGRAPHY LUNG VOLUMES: CPT

## 2022-05-10 PROCEDURE — 94729 DIFFUSING CAPACITY: CPT

## 2022-05-10 NOTE — PROGRESS NOTES
Prescreening performed prior to testing. The following symptoms may indicate COVID-19 infection:        One of the following criteria:   Temperature taken, patient temperature was 96.9 F. Fever greater 100.0 F -no  New onset cough -  no  New onset shortness of breath -no  New onset difficulty breathing -no        And/or   Two or more of the following criteria:  New onset muscle aches -no  New onset headache -no  New onset sore throat -no  New onset loss of smell/taste -no  New onset diarrhea -no    Patient's screening was negative. PFT will be performed.

## 2022-05-23 ENCOUNTER — OFFICE VISIT (OUTPATIENT)
Dept: PULMONOLOGY | Age: 47
End: 2022-05-23
Payer: COMMERCIAL

## 2022-05-23 VITALS
SYSTOLIC BLOOD PRESSURE: 120 MMHG | HEART RATE: 89 BPM | BODY MASS INDEX: 45.98 KG/M2 | TEMPERATURE: 97.5 F | OXYGEN SATURATION: 99 % | WEIGHT: 276 LBS | DIASTOLIC BLOOD PRESSURE: 70 MMHG | HEIGHT: 65 IN

## 2022-05-23 DIAGNOSIS — J45.20 MILD INTERMITTENT ASTHMA WITHOUT COMPLICATION: ICD-10-CM

## 2022-05-23 DIAGNOSIS — Z99.89 OSA ON CPAP: Primary | ICD-10-CM

## 2022-05-23 DIAGNOSIS — Z01.818 PRE-OP EVALUATION: ICD-10-CM

## 2022-05-23 DIAGNOSIS — G47.33 OSA ON CPAP: Primary | ICD-10-CM

## 2022-05-23 PROCEDURE — 99215 OFFICE O/P EST HI 40 MIN: CPT | Performed by: INTERNAL MEDICINE

## 2022-05-23 PROCEDURE — G8428 CUR MEDS NOT DOCUMENT: HCPCS | Performed by: INTERNAL MEDICINE

## 2022-05-23 PROCEDURE — 1036F TOBACCO NON-USER: CPT | Performed by: INTERNAL MEDICINE

## 2022-05-23 PROCEDURE — G8417 CALC BMI ABV UP PARAM F/U: HCPCS | Performed by: INTERNAL MEDICINE

## 2022-05-23 NOTE — PATIENT INSTRUCTIONS
\Recommendations/Plan:  -Patient's pulmonary status is at optimal control with current therapy  -Patient can go for planned surgery from pulmonary point of view. -Patient is at low to moderate risk (low risk if her surgery duration is <2hours to moderate risk if her surgery duration is more than 2hours) for main and post operative pulmonary complications, if general anesthesia is used as anesthesia of choice for planned procedure. The risk also includes prolonged mechanical ventilation.  -Patient needs vigorous bronchodilator therapy with albuterol 2.5mg via nebulizer Q6h and Q2h prn before, during and after procedure. Patient need to continue rest of her inhalers as prescribed.  -Patient about increased risk and she agreed to take the risk and verbalizes understanding.  -Patient need to be educated about incentive spirometry before surgery to facilitate it's use in the post operative period Q4h as tolerated. -She was advised to continue to take Singulair 10 mg p.o. nightly with good compliance. She was detailed about mechanism of action of drug along with associated side effects along with increased risk for worsening of depression. She agreed to take the risk and medication. She verbalizes understanding.  -Continue albuterol HFA 2 puffs every 6 hourly as needed.  -Patient's obstructive sleep apnea is at optimal control with current therapy  -She need to kept on her CPAP machine with current recommended pressure/s during and after planned procedure if she requires sedation. Avoid general anesthesia if possible for planned procedure. Patient respiratory status need to be monitored closely in the post operative period by a monitored bed in a hospital setting. She was informed about my recommendations. She verbalizes understanding.  -Patient advised to continue current inhaler and keep good compliance.  Patient verbalizes understanding.  -Patient educated to update his pneumococcal vaccine with family physician and take influenza vaccine in coming season with out fail. Patient verbalizes understanding.  -Shirley Melo educated about environmental safety precautions she need to practice to prevent exacerbation ofher Asthma. Greg Mendoza verbalizes understanding.   -Shirley Melo was advised  to keep her scheduled follow up at [facility] for pulmonary disease) sleep clinic for further evaluation and management of sleep apnea. -Schedule patient for follow up with my clinic in 12months for a clinical reevaluation with a spirometry before clinic visit. Patient advised to make early appointment if needed.  -Patient educated about my impression and plan. Patient verbalizes understanding.

## 2022-05-24 DIAGNOSIS — Z01.818 PREOP EXAMINATION: ICD-10-CM

## 2022-05-24 DIAGNOSIS — E78.5 HYPERLIPIDEMIA, UNSPECIFIED HYPERLIPIDEMIA TYPE: ICD-10-CM

## 2022-05-24 DIAGNOSIS — E66.01 MORBID OBESITY WITH BMI OF 45.0-49.9, ADULT (HCC): Primary | ICD-10-CM

## 2022-05-24 DIAGNOSIS — E78.5 DYSLIPIDEMIA: ICD-10-CM

## 2022-05-24 DIAGNOSIS — E11.9 TYPE 2 DIABETES MELLITUS WITHOUT COMPLICATION, WITHOUT LONG-TERM CURRENT USE OF INSULIN (HCC): ICD-10-CM

## 2022-05-25 ENCOUNTER — HOSPITAL ENCOUNTER (OUTPATIENT)
Age: 47
Discharge: HOME | End: 2022-05-25
Payer: COMMERCIAL

## 2022-05-25 DIAGNOSIS — E66.01 MORBID OBESITY WITH BMI OF 45.0-49.9, ADULT (HCC): ICD-10-CM

## 2022-05-25 DIAGNOSIS — Z01.818 PREOP EXAMINATION: ICD-10-CM

## 2022-05-25 DIAGNOSIS — E11.9 TYPE 2 DIABETES MELLITUS WITHOUT COMPLICATION, WITHOUT LONG-TERM CURRENT USE OF INSULIN (HCC): ICD-10-CM

## 2022-05-25 DIAGNOSIS — E78.5 DYSLIPIDEMIA: ICD-10-CM

## 2022-05-25 DIAGNOSIS — E78.5 HYPERLIPIDEMIA, UNSPECIFIED HYPERLIPIDEMIA TYPE: ICD-10-CM

## 2022-05-25 LAB
AVERAGE GLUCOSE: 153 MG/DL (ref 70–126)
CHOLESTEROL, TOTAL: 122 MG/DL (ref 100–199)
FERRITIN: 36 NG/ML (ref 10–291)
FOLATE: > 20 NG/ML (ref 4.8–24.2)
HBA1C MFR BLD: 7.1 % (ref 4.4–6.4)
HDLC SERPL-MCNC: 65 MG/DL
IRON: 51 UG/DL (ref 50–170)
LDL CHOLESTEROL CALCULATED: 42 MG/DL
PTH INTACT: 34.7 PG/ML (ref 15–65)
TOTAL IRON BINDING CAPACITY: 302 UG/DL (ref 171–450)
TRIGL SERPL-MCNC: 75 MG/DL (ref 0–199)
TSH SERPL DL<=0.05 MIU/L-ACNC: 0.77 UIU/ML (ref 0.4–4.2)
VITAMIN B-12: 784 PG/ML (ref 211–911)
VITAMIN D 25-HYDROXY: 32 NG/ML (ref 30–100)

## 2022-05-25 PROCEDURE — 82728 ASSAY OF FERRITIN: CPT

## 2022-05-25 PROCEDURE — 82746 ASSAY OF FOLIC ACID SERUM: CPT

## 2022-05-25 PROCEDURE — 82306 VITAMIN D 25 HYDROXY: CPT

## 2022-05-25 PROCEDURE — 84425 ASSAY OF VITAMIN B-1: CPT

## 2022-05-25 PROCEDURE — 84443 ASSAY THYROID STIM HORMONE: CPT

## 2022-05-25 PROCEDURE — 82607 VITAMIN B-12: CPT

## 2022-05-25 PROCEDURE — 83970 ASSAY OF PARATHORMONE: CPT

## 2022-05-25 PROCEDURE — 83540 ASSAY OF IRON: CPT

## 2022-05-25 PROCEDURE — 83036 HEMOGLOBIN GLYCOSYLATED A1C: CPT

## 2022-05-25 PROCEDURE — G0480 DRUG TEST DEF 1-7 CLASSES: HCPCS

## 2022-05-25 PROCEDURE — 83550 IRON BINDING TEST: CPT

## 2022-05-25 PROCEDURE — 80061 LIPID PANEL: CPT

## 2022-05-25 PROCEDURE — 80307 DRUG TEST PRSMV CHEM ANLYZR: CPT

## 2022-05-25 PROCEDURE — 36415 COLL VENOUS BLD VENIPUNCTURE: CPT

## 2022-05-29 LAB
3-OH-COTININE: < 2 NG/ML
COTININE: < 2 NG/ML
NICOTINE: < 2 NG/ML
URINE DRUG PROFILE: NORMAL

## 2022-05-31 LAB — VITAMIN B1 WHOLE BLOOD: 123 NMOL/L (ref 70–180)

## 2022-06-03 ENCOUNTER — TELEPHONE (OUTPATIENT)
Dept: PULMONOLOGY | Age: 47
End: 2022-06-03

## 2022-06-03 NOTE — TELEPHONE ENCOUNTER
Patient called in stating she keeps waking up 3-4 times a night and I attached her download to this message please advise.

## 2022-06-03 NOTE — TELEPHONE ENCOUNTER
I called Jax Ng at given [employer] Home/mobile phone number. Unfortunately she  is not available to speak with me at this time. I want to leave a message on her answering service to call back to our office as soon as possible. [employer] answering system is not accepting any messages. I reviewed the patient's C CPAP download. She is using her CPAP machine with excellent compliance I.e more than 4hours with 100% compliance. Her residual apnea hypopnea's is under control. I did not see any significant leaks. Plan/recommendations:  She need to practice a good sleep hygiene practices. She need to keep her Bedroom cool by using air conditioner. Continue to take melatonin and Requip as prescribed in the past  Keep scheduled follow-up appointment with my clinic.

## 2022-06-22 PROBLEM — Z01.818 PRE-OP EVALUATION: Status: RESOLVED | Noted: 2022-05-23 | Resolved: 2022-06-22

## 2022-06-27 ENCOUNTER — OFFICE VISIT (OUTPATIENT)
Dept: BARIATRICS/WEIGHT MGMT | Age: 47
End: 2022-06-27

## 2022-06-27 VITALS — HEIGHT: 66 IN | BODY MASS INDEX: 43.78 KG/M2 | WEIGHT: 272.4 LBS

## 2022-06-27 DIAGNOSIS — E66.01 MORBID OBESITY WITH BMI OF 40.0-44.9, ADULT (HCC): Primary | ICD-10-CM

## 2022-06-27 RX ORDER — ENOXAPARIN SODIUM 100 MG/ML
40 INJECTION SUBCUTANEOUS DAILY
Qty: 14 EACH | Refills: 0 | Status: SHIPPED | OUTPATIENT
Start: 2022-07-11 | End: 2022-08-23

## 2022-06-27 RX ORDER — METOCLOPRAMIDE 10 MG/1
10 TABLET ORAL EVERY 6 HOURS PRN
Qty: 30 TABLET | Refills: 0 | Status: SHIPPED | OUTPATIENT
Start: 2022-07-11 | End: 2022-07-19

## 2022-06-27 RX ORDER — ASPIRIN 81 MG
100 TABLET, DELAYED RELEASE (ENTERIC COATED) ORAL 2 TIMES DAILY
Qty: 30 TABLET | Refills: 1 | Status: SHIPPED | OUTPATIENT
Start: 2022-07-11 | End: 2022-09-29

## 2022-06-27 NOTE — PROGRESS NOTES
Nolan Ramon was seen today for pre-operative teaching class. She   was educated today on surgery procedure, possible complications, Lovenox self administration (teaching guide given to patient as well as Lovenox DVD viewed today), use of incentive spirometer for 2 weeks prior to surgery date and instructed to remain NPO after midnight the night before surgery, or risk cancellation of surgical procedure. Importance of recognizing signs and symptoms of wound infection were discussed as well as when to contact the physician. We discussed the flow of events on the day of surgery from admit to SDS, OR, PACU, and 7K admit. I reinforced the need to walk post operative on 7K and she voiced understanding of this. Pre-operative measurements were taken and scanned into chart as well as measurement for PO day 1 gastrograffin swallow test. SECA scale completed today. Educated on post-op pain medication and how to dispose of any unused pain medications.

## 2022-06-27 NOTE — PROGRESS NOTES
Diomedes Washington lost 8 lbs  since joining [facility].  After nutrition evaluation and education, patient is considered to be a good surgical candidate from a MNT perspective. Patient's body composition for pre-op teaching class  was measured using the [facility] Scale. Results were saved and reviewed with the patient. Patient was educated on 2- week pre-operative nutrition protocol and post test completed. Pre-operative and post-operative diet guidelines were discussed and written information was provided. Nectar protein supplements were purchased. Vitamin regimen with Bariatric Advantage and/or Celebrate vitamins was explained, and patient purchased vitamins at this visit. Importance of vitamin compliance was discussed and potential of vitamin deficiencies was reviewed. Encouraged continued physical activity. Patient signed agreement stating they are committed to lifelong follow up, smoking and alcohol cessation, vitamin compliance, and 2 week pre-operative dietary protocol.

## 2022-07-01 NOTE — PROGRESS NOTES
Following instructions given to patient, who states understanding:    NPO after midnight  [employer] and 's license  Wear comfortable clean clothing  Do not bring jewelry   Shower night before and morning of surgery with a liquid antibacterial soap  Bring medications in original bottles  Follow all instructions given by your physician   needed at discharge  Call [phone] for any questions  Report to SDS on 2nd floor  If you would become ill prior to surgery, please call the surgeon  May have a visitor with you, we request that you limit to 2 visitors in pre-op area  Please bring and wear mask

## 2022-07-07 ENCOUNTER — PREP FOR PROCEDURE (OUTPATIENT)
Dept: BARIATRICS/WEIGHT MGMT | Age: 47
End: 2022-07-07

## 2022-07-07 RX ORDER — SODIUM CHLORIDE 9 MG/ML
INJECTION, SOLUTION INTRAVENOUS PRN
Status: CANCELLED | OUTPATIENT
Start: 2022-07-11

## 2022-07-07 RX ORDER — FAMOTIDINE 10 MG/ML
20 INJECTION, SOLUTION INTRAVENOUS ONCE
Status: CANCELLED | OUTPATIENT
Start: 2022-07-11

## 2022-07-07 RX ORDER — SODIUM CHLORIDE 0.9 % (FLUSH) 0.9 %
5-40 SYRINGE (ML) INJECTION EVERY 12 HOURS SCHEDULED
Status: CANCELLED | OUTPATIENT
Start: 2022-07-11

## 2022-07-07 RX ORDER — SCOLOPAMINE TRANSDERMAL SYSTEM 1 MG/1
1 PATCH, EXTENDED RELEASE TRANSDERMAL
Status: CANCELLED | OUTPATIENT
Start: 2022-07-11 | End: 2022-07-14

## 2022-07-07 RX ORDER — SODIUM CHLORIDE 0.9 % (FLUSH) 0.9 %
5-40 SYRINGE (ML) INJECTION PRN
Status: CANCELLED | OUTPATIENT
Start: 2022-07-11

## 2022-07-08 NOTE — H&P
Karyn Lopez (:  1975)      ASSESSMENT:  1.  Morbid obesity (BMI 46)  2.  Sleep apnea  3.  Anxiety  4.  Depression  5.  Diabetes mellitus  6.  Asthma  7.  Multinodular goiter  8.  Restless leg syndrome  9. Hyperlipidemia     PLAN:  1. Discussion again about the pros and cons of weight loss surgery. The risks benefits and alternatives to laparoscopic adjustable band, gastric sleeve and gastric Jadiel-en-Y bypass were discussed in detail. The pros and cons of robotic assisted, laparoscopic and open techniques were discussed. 2.  Behavior modification discussed again in regards to dietary habits. 3.  Nutritional education occurred during visit. Follow up with dietitian for further evaluation. 4.  Options for medical management of morbid obesity again discussed. 5.  Improvement in fitness/exercise again discussed with patient and the need for this with/without surgery. 6.  Medical necessity letter from PCP. 7.  Follow-up in one month at weight management program at [facility]. 8.  Signs and symptoms reviewed with patient that would be concerning and need her to return to office for re-evaluation. Patient states she will call if she has questions or concerns. 9. Multivitamin  10. Psychology evaluation completed. Following up as needed. 11. EGD completed. H. pylori negative. No significant hiatal hernia. 12.  Encouraged support groups  13. Send LOMN     Patient states that she has not been able to lose enough adequate excess body weight with medical management only and would like to proceed with a robotic sleeve gastrectomy for further weight loss.     More than 25 minutes spent with patient today.   Greater than 50% of the time was involved counseling, educaton and coordinating care.     SUBJECTIVE/OBJECTIVE:          Chief Complaint   Patient presents with    Follow-up       month 6 of 6; desires sleeve       ENZO  Jamin is a 70-year-old female who presents for follow-up at the weight management program secondary to her morbid obesity. BMI 46. Current weight 273 pounds. Multiple comorbid conditions. She has not been able to lose enough adequate excess body weight and is wishing to proceed with a robotic sleeve gastrectomy for further weight loss. Most recent hemoglobin A1c 6.9. Completed psychology evaluation. Attended support groups. Working with the dietitian. Continuing to work on food selection and portion control. Completed upper endoscopy. H. pylori negative. No significant hiatal hernia. Following with pulmonary service for sleep apnea. Next appointment scheduled and made. Following with cardiology as well. Clearance in process. Echocardiogram beginning of next week. Taking multivitamin. Trying to become more active. Doing chair exercises. Logging exercises. Denies chest or abdominal pain. No hematochezia or melena. No new urinary complaints. She states she is excited to proceed with surgical intervention to continue and work towards her weight loss goals.     Review of Systems   Constitutional: Negative for activity change, appetite change, chills, diaphoresis, fatigue, fever and unexpected weight change. HENT: Negative for congestion, dental problem, drooling, ear discharge, ear pain, facial swelling, hearing loss, mouth sores, nosebleeds, postnasal drip, rhinorrhea, sinus pressure, sneezing, sore throat, tinnitus, trouble swallowing and voice change. Eyes: Negative for photophobia, pain, discharge, redness, itching and visual disturbance. Respiratory: Negative for apnea, cough, choking, chest tightness, shortness of breath, wheezing and stridor. Cardiovascular: Negative for chest pain, palpitations and leg swelling. Gastrointestinal: Negative for abdominal distention, abdominal pain, anal bleeding, blood in stool, constipation, diarrhea, nausea, rectal pain and vomiting. Endocrine: Negative.     Genitourinary: Negative for decreased urine volume, difficulty urinating, dyspareunia, dysuria, enuresis, flank pain, frequency, genital sores, hematuria, menstrual problem, pelvic pain, urgency, vaginal bleeding, vaginal discharge and vaginal pain. Musculoskeletal: Negative for arthralgias, back pain, gait problem, joint swelling, myalgias, neck pain and neck stiffness. Skin: Negative for color change, pallor, rash and wound. Allergic/Immunologic: Negative. Neurological: Negative for dizziness, tremors, seizures, syncope, facial asymmetry, speech difficulty, weakness, light-headedness, numbness and headaches. Hematological: Negative for adenopathy. Does not bruise/bleed easily. Psychiatric/Behavioral: Negative for agitation, behavioral problems, confusion, decreased concentration, dysphoric mood, hallucinations, self-injury, sleep disturbance and suicidal ideas. The patient is not nervous/anxious and is not hyperactive.          Past Medical History        Past Medical History:   Diagnosis Date    Allergic rhinitis      Anxiety      Asthma      Depression      Diabetes ([facility] [address]      Psychiatric problem       depression    Sleep apnea              Past Surgical History         Past Surgical History:   Procedure Laterality Date    CARPAL TUNNEL RELEASE         SECTION        HYSTERECTOMY        KNEE ARTHROSCOPY                Current Facility-Administered Medications          Current Outpatient Medications   Medication Sig Dispense Refill    PARoxetine (PAXIL) 10 MG tablet Take 20 mg by mouth every morning        calcium carbonate (OSCAL) 500 MG TABS tablet Take 500 mg by mouth daily        melatonin 3 MG TABS tablet Take 1 tablet by mouth nightly as needed (Insomnia) 30 tablet 11    Multiple Vitamins-Minerals (THERAPEUTIC MULTIVITAMIN-MINERALS) tablet Take 1 tablet by mouth daily        CPAP Machine MISC by Does not apply route Please check patient's CPAP machine for proper functioning by [employer].   She was prescribed with a CPAP with a pressure of 11 cm of water with room air. 1 each 0    traZODone (DESYREL) 150 MG tablet Take 150 mg by mouth nightly        Dulaglutide (TRULICITY) 5.35 IT/9.3UR SOPN Inject 0.75 mg into the skin once a week Taking 1.5        omeprazole (PRILOSEC) 40 MG delayed release capsule Take 1 capsule by mouth daily        oxybutynin (DITROPAN-XL) 5 MG extended release tablet Take 1 tablet by mouth daily         atorvastatin (LIPITOR) 40 MG tablet TAKE ONE TABLET BY MOUTH DAILY 30 tablet 3    metFORMIN (GLUCOPHAGE) 1000 MG tablet TAKE ONE TABLET BY MOUTH TWICE A DAY WITH MEALS (Patient taking differently: See Admin Instructions 500 mg in AM, 1000mg in PM.) 60 tablet 3    Blood Glucose Monitoring Suppl (ACCU-CHEK ROD PLUS) w/Device KIT Check blood sugar once daily 1 kit 0    glucose blood VI test strips (ACCU-CHEK ROD PLUS) strip Check blood sugar once daily 100 each 3    Lancets MISC Use to test blood glucose level 1 time per day.  Diagnosis: E11.9 100 each 1    Cetirizine HCl (ZYRTEC ALLERGY) 10 MG CAPS Take 1 tablet by mouth daily        montelukast (SINGULAIR) 10 MG tablet Take 10 mg by mouth nightly        rOPINIRole (REQUIP) 0.25 MG tablet Take 4 mg by mouth nightly         albuterol sulfate  (90 BASE) MCG/ACT inhaler Inhale 2 puffs into the lungs every 6 hours as needed for Wheezing          No current facility-administered medications for this visit.                 Allergies   Allergen Reactions    Latex Rash    Keflex [Cephalexin] Rash    Betadine [Povidone Iodine]      Neosporin [Neomycin-Polymyxin-Gramicidin]      Pcn [Penicillins]      Rocephin [Ceftriaxone] Rash         Family History         Family History   Problem Relation Age of Onset    Thyroid Disease Mother      Depression Mother      Asthma Mother      Arthritis Mother      Cancer Father      Arthritis Father      Diabetes Sister      Arthritis Sister      Cancer Maternal Uncle      Arthritis Paternal Grandfather      Cancer Paternal Grandfather      Arthritis Paternal Grandmother      Arthritis Maternal Grandfather      Arthritis Maternal Grandmother      Asthma Maternal Grandmother              Social History               Socioeconomic History    Marital status:        Spouse name: Not on file    Number of children: 3    Years of education: Not on file    Highest education level: Not on file   Occupational History    Not on file   Tobacco Use    Smoking status: Never Smoker    Smokeless tobacco: Never Used   Vaping Use    Vaping Use: Never used   Substance and Sexual Activity    Alcohol use: Yes       Comment: socially    Drug use: No    Sexual activity: Yes   Other Topics Concern    Not on file   Social History Narrative    Not on file      Social Determinants of Health          Financial Resource Strain: Low Risk     Difficulty of Paying Living Expenses: Not very hard   Food Insecurity: Food Insecurity Present    Worried About Running Out of Food in the Last Year: Sometimes true    Antwan of Food in the Last Year: Sometimes true   Transportation Needs: No Transportation Needs    Lack of Transportation (Medical): No    Lack of Transportation (Non-Medical): No   Physical Activity: Sufficiently Active    Days of Exercise per Week: 5 days    Minutes of Exercise per Session: 30 min   Stress: Stress Concern Present    Feeling of Stress : Rather much   Social Connections: Moderately Integrated    Frequency of Communication with Friends and Family: More than three times a week    Frequency of Social Gatherings with Friends and Family:  Three times a week    Attends Anabaptist Services: More than 4 times per year    Active Member of Clubs or Organizations: No    Attends Club or Organization Meetings: Patient refused    Marital Status:    Intimate Partner Violence: Not At Risk    Fear of Current or Ex-Partner: No    Emotionally Abused: No    Physically Abused: No    Sexually Abused: No   Housing Stability: Low Risk     Unable to Pay for Housing in the Last Year: No    Number of Places Lived in the Last Year: 1    Unstable Housing in the Last Year: No         Vitals       Vitals:     04/29/22 1015   BP: 122/72   Site: Right Upper Arm   Position: Sitting   Cuff Size: Large Adult   Pulse: 88   Resp: 18   Temp: 97.2 °F (36.2 °C)   TempSrc: Infrared   Weight: 273 lb 3.2 oz (123.9 kg)   Height: 5' 4\" (1.626 m)         Body mass index is 46.89 kg/m².         Wt Readings from Last 3 Encounters:   04/29/22 273 lb 3.2 oz (123.9 kg)   03/24/22 231 lb (104.8 kg)   03/21/22 266 lb 6.4 oz (120.8 kg)      Physical Exam  Vitals reviewed. Constitutional:       General: She is not in acute distress. Appearance: She is well-developed. She is not diaphoretic. HENT:      Head: Normocephalic and atraumatic. Right Ear: External ear normal.      Left Ear: External ear normal.      Nose: Nose normal.   Eyes:      General: No scleral icterus. Right eye: No discharge. Left eye: No discharge. Conjunctiva/sclera: Conjunctivae normal.   Cardiovascular:      Rate and Rhythm: Normal rate and regular rhythm. Heart sounds: Normal heart sounds. Pulmonary:      Effort: Pulmonary effort is normal. No respiratory distress. Breath sounds: Normal breath sounds. No wheezing or rales. Chest:      Chest wall: No tenderness. Abdominal:      General: Bowel sounds are normal. There is no distension. Palpations: Abdomen is soft. There is no mass. Tenderness: There is no abdominal tenderness. There is no guarding or rebound. Musculoskeletal:         General: No tenderness. Normal range of motion. Cervical back: Normal range of motion and neck supple. Skin:     General: Skin is warm and dry. Coloration: Skin is not pale. Findings: No erythema or rash.    Neurological:      Mental Status: She is alert and oriented to person, place, and time. Cranial Nerves: No cranial nerve deficit. Psychiatric:         Behavior: Behavior normal.         Thought Content: Thought content normal.         Judgment: Judgment normal.               Lab Results   Component Value Date     WBC 6.7 10/04/2021     HGB 12.6 10/04/2021     HCT 39.2 10/04/2021     MCV 91.6 10/04/2021      10/04/2021            Lab Results   Component Value Date      10/04/2021     K 4.1 10/04/2021      10/04/2021     CO2 23 10/04/2021            Lab Results   Component Value Date     CREATININE 0.6 10/04/2021            Lab Results   Component Value Date     ALT 14 10/04/2021     AST 15 10/04/2021     ALKPHOS 68 10/04/2021     BILITOT 0.5 10/04/2021            Lab Results   Component Value Date     LIPASE 17.8 11/29/2020             Patient Active Problem List   Diagnosis    Dyslipidemia    Cellulitis of lower extremity  bilateral    Allergic drug reaction    Myalgia  lower wxtremities    Staphylococcal skin disorder    Type 2 diabetes mellitus without complication, without long-term current use of insulin (HCC)    RLS (restless legs syndrome)    Multinodular goiter                                 An electronic signature was used to authenticate this note.     --Karen Westbrook MD     ADDENDUM:   1. Schedule Suzi for robotic sleeve gastrectomy. 2. She will undergo pre-operative clearance per anesthesia guidelines with risk factors listed under the past medical history diagnosis & problem list.  3. The risks, benefits and alternatives were discussed with Katie Bridges including non-operative management. The pros and cons of robotic, laparoscopic and open techniques were discussed. All questions answered. She understands and wishes to proceed with surgical intervention. 4. Restrictions discussed with Katie Bridges and she expresses understanding.   5. She is advised to call back directly if there are further questions/concerns, or if her symptoms worsen prior to surgery.     Electronically signed by Liz Pascual MD on 7/8/22 at 7:25 AM EDT

## 2022-07-11 ENCOUNTER — ANESTHESIA (OUTPATIENT)
Dept: OPERATING ROOM | Age: 47
DRG: 403 | End: 2022-07-11
Payer: COMMERCIAL

## 2022-07-11 ENCOUNTER — HOSPITAL ENCOUNTER (INPATIENT)
Age: 47
LOS: 2 days | Discharge: HOME HEALTH | DRG: 403 | End: 2022-07-13
Attending: SURGERY | Admitting: SURGERY
Payer: COMMERCIAL

## 2022-07-11 ENCOUNTER — ANESTHESIA EVENT (OUTPATIENT)
Dept: OPERATING ROOM | Age: 47
DRG: 403 | End: 2022-07-11
Payer: COMMERCIAL

## 2022-07-11 DIAGNOSIS — E66.01 CLASS 3 SEVERE OBESITY WITH BODY MASS INDEX (BMI) OF 45.0 TO 49.9 IN ADULT, UNSPECIFIED OBESITY TYPE, UNSPECIFIED WHETHER SERIOUS COMORBIDITY PRESENT (HCC): ICD-10-CM

## 2022-07-11 DIAGNOSIS — Z90.3 S/P GASTRIC SLEEVE PROCEDURE: Primary | ICD-10-CM

## 2022-07-11 PROBLEM — E66.9 OBESITY: Status: ACTIVE | Noted: 2022-07-11

## 2022-07-11 LAB
GLUCOSE BLD-MCNC: 141 MG/DL (ref 70–108)
GLUCOSE BLD-MCNC: 207 MG/DL (ref 70–108)
GLUCOSE BLD-MCNC: 215 MG/DL (ref 70–108)
GLUCOSE BLD-MCNC: 223 MG/DL (ref 70–108)

## 2022-07-11 PROCEDURE — 0DB64Z3 EXCISION OF STOMACH, PERCUTANEOUS ENDOSCOPIC APPROACH, VERTICAL: ICD-10-PCS | Performed by: SURGERY

## 2022-07-11 PROCEDURE — 6370000000 HC RX 637 (ALT 250 FOR IP): Performed by: SURGERY

## 2022-07-11 PROCEDURE — 6370000000 HC RX 637 (ALT 250 FOR IP): Performed by: STUDENT IN AN ORGANIZED HEALTH CARE EDUCATION/TRAINING PROGRAM

## 2022-07-11 PROCEDURE — 2500000003 HC RX 250 WO HCPCS

## 2022-07-11 PROCEDURE — 6360000002 HC RX W HCPCS: Performed by: SURGERY

## 2022-07-11 PROCEDURE — 43775 LAP SLEEVE GASTRECTOMY: CPT | Performed by: SURGERY

## 2022-07-11 PROCEDURE — 3600000019 HC SURGERY ROBOT ADDTL 15MIN: Performed by: SURGERY

## 2022-07-11 PROCEDURE — 6360000002 HC RX W HCPCS: Performed by: NURSE PRACTITIONER

## 2022-07-11 PROCEDURE — 88300 SURGICAL PATH GROSS: CPT

## 2022-07-11 PROCEDURE — 6360000002 HC RX W HCPCS

## 2022-07-11 PROCEDURE — 3600000009 HC SURGERY ROBOT BASE: Performed by: SURGERY

## 2022-07-11 PROCEDURE — 2500000003 HC RX 250 WO HCPCS: Performed by: NURSE ANESTHETIST, CERTIFIED REGISTERED

## 2022-07-11 PROCEDURE — 2580000003 HC RX 258: Performed by: SURGERY

## 2022-07-11 PROCEDURE — 6370000000 HC RX 637 (ALT 250 FOR IP)

## 2022-07-11 PROCEDURE — 6360000002 HC RX W HCPCS: Performed by: NURSE ANESTHETIST, CERTIFIED REGISTERED

## 2022-07-11 PROCEDURE — A4216 STERILE WATER/SALINE, 10 ML: HCPCS

## 2022-07-11 PROCEDURE — 2720000010 HC SURG SUPPLY STERILE: Performed by: SURGERY

## 2022-07-11 PROCEDURE — S2900 ROBOTIC SURGICAL SYSTEM: HCPCS | Performed by: SURGERY

## 2022-07-11 PROCEDURE — 3700000000 HC ANESTHESIA ATTENDED CARE: Performed by: SURGERY

## 2022-07-11 PROCEDURE — 1200000000 HC SEMI PRIVATE

## 2022-07-11 PROCEDURE — 3700000001 HC ADD 15 MINUTES (ANESTHESIA): Performed by: SURGERY

## 2022-07-11 PROCEDURE — 2500000003 HC RX 250 WO HCPCS: Performed by: SURGERY

## 2022-07-11 PROCEDURE — 2580000003 HC RX 258

## 2022-07-11 PROCEDURE — 7100000000 HC PACU RECOVERY - FIRST 15 MIN: Performed by: SURGERY

## 2022-07-11 PROCEDURE — 82948 REAGENT STRIP/BLOOD GLUCOSE: CPT

## 2022-07-11 PROCEDURE — 6360000002 HC RX W HCPCS: Performed by: STUDENT IN AN ORGANIZED HEALTH CARE EDUCATION/TRAINING PROGRAM

## 2022-07-11 PROCEDURE — 8E0W4CZ ROBOTIC ASSISTED PROCEDURE OF TRUNK REGION, PERCUTANEOUS ENDOSCOPIC APPROACH: ICD-10-PCS | Performed by: SURGERY

## 2022-07-11 PROCEDURE — 7100000001 HC PACU RECOVERY - ADDTL 15 MIN: Performed by: SURGERY

## 2022-07-11 PROCEDURE — 2709999900 HC NON-CHARGEABLE SUPPLY: Performed by: SURGERY

## 2022-07-11 PROCEDURE — 94640 AIRWAY INHALATION TREATMENT: CPT

## 2022-07-11 RX ORDER — SODIUM CHLORIDE 9 MG/ML
INJECTION, SOLUTION INTRAVENOUS CONTINUOUS
Status: DISCONTINUED | OUTPATIENT
Start: 2022-07-11 | End: 2022-07-13 | Stop reason: HOSPADM

## 2022-07-11 RX ORDER — INSULIN LISPRO 100 [IU]/ML
0-3 INJECTION, SOLUTION INTRAVENOUS; SUBCUTANEOUS NIGHTLY
Status: DISCONTINUED | OUTPATIENT
Start: 2022-07-11 | End: 2022-07-13 | Stop reason: HOSPADM

## 2022-07-11 RX ORDER — PROMETHAZINE HYDROCHLORIDE 25 MG/1
25 SUPPOSITORY RECTAL EVERY 6 HOURS PRN
Status: DISCONTINUED | OUTPATIENT
Start: 2022-07-11 | End: 2022-07-13 | Stop reason: HOSPADM

## 2022-07-11 RX ORDER — SODIUM CHLORIDE 0.9 % (FLUSH) 0.9 %
5-40 SYRINGE (ML) INJECTION PRN
Status: DISCONTINUED | OUTPATIENT
Start: 2022-07-11 | End: 2022-07-13 | Stop reason: HOSPADM

## 2022-07-11 RX ORDER — DROPERIDOL 2.5 MG/ML
0.62 INJECTION, SOLUTION INTRAMUSCULAR; INTRAVENOUS ONCE
Status: COMPLETED | OUTPATIENT
Start: 2022-07-11 | End: 2022-07-11

## 2022-07-11 RX ORDER — KETOROLAC TROMETHAMINE 30 MG/ML
15 INJECTION, SOLUTION INTRAMUSCULAR; INTRAVENOUS EVERY 6 HOURS
Status: DISCONTINUED | OUTPATIENT
Start: 2022-07-11 | End: 2022-07-13 | Stop reason: HOSPADM

## 2022-07-11 RX ORDER — CIPROFLOXACIN 2 MG/ML
400 INJECTION, SOLUTION INTRAVENOUS
Status: DISCONTINUED | OUTPATIENT
Start: 2022-07-11 | End: 2022-07-13 | Stop reason: HOSPADM

## 2022-07-11 RX ORDER — FENTANYL CITRATE 50 UG/ML
INJECTION, SOLUTION INTRAMUSCULAR; INTRAVENOUS PRN
Status: DISCONTINUED | OUTPATIENT
Start: 2022-07-11 | End: 2022-07-11 | Stop reason: SDUPTHER

## 2022-07-11 RX ORDER — SODIUM CHLORIDE 0.9 % (FLUSH) 0.9 %
5-40 SYRINGE (ML) INJECTION PRN
Status: DISCONTINUED | OUTPATIENT
Start: 2022-07-11 | End: 2022-07-11 | Stop reason: HOSPADM

## 2022-07-11 RX ORDER — MEPERIDINE HYDROCHLORIDE 25 MG/ML
12.5 INJECTION INTRAMUSCULAR; INTRAVENOUS; SUBCUTANEOUS EVERY 5 MIN PRN
Status: DISCONTINUED | OUTPATIENT
Start: 2022-07-11 | End: 2022-07-11 | Stop reason: HOSPADM

## 2022-07-11 RX ORDER — DIPHENHYDRAMINE HYDROCHLORIDE 50 MG/ML
12.5 INJECTION INTRAMUSCULAR; INTRAVENOUS
Status: DISCONTINUED | OUTPATIENT
Start: 2022-07-11 | End: 2022-07-11 | Stop reason: HOSPADM

## 2022-07-11 RX ORDER — DROPERIDOL 2.5 MG/ML
INJECTION, SOLUTION INTRAMUSCULAR; INTRAVENOUS
Status: COMPLETED
Start: 2022-07-11 | End: 2022-07-11

## 2022-07-11 RX ORDER — BUPIVACAINE HYDROCHLORIDE AND EPINEPHRINE 5; 5 MG/ML; UG/ML
INJECTION, SOLUTION EPIDURAL; INTRACAUDAL; PERINEURAL PRN
Status: DISCONTINUED | OUTPATIENT
Start: 2022-07-11 | End: 2022-07-11 | Stop reason: ALTCHOICE

## 2022-07-11 RX ORDER — SODIUM CHLORIDE 9 MG/ML
INJECTION, SOLUTION INTRAVENOUS PRN
Status: DISCONTINUED | OUTPATIENT
Start: 2022-07-11 | End: 2022-07-11 | Stop reason: CLARIF

## 2022-07-11 RX ORDER — IPRATROPIUM BROMIDE AND ALBUTEROL SULFATE 2.5; .5 MG/3ML; MG/3ML
1 SOLUTION RESPIRATORY (INHALATION) ONCE
Status: COMPLETED | OUTPATIENT
Start: 2022-07-11 | End: 2022-07-11

## 2022-07-11 RX ORDER — SODIUM CHLORIDE 0.9 % (FLUSH) 0.9 %
5-40 SYRINGE (ML) INJECTION EVERY 12 HOURS SCHEDULED
Status: DISCONTINUED | OUTPATIENT
Start: 2022-07-11 | End: 2022-07-13 | Stop reason: HOSPADM

## 2022-07-11 RX ORDER — MIDAZOLAM HYDROCHLORIDE 1 MG/ML
INJECTION INTRAMUSCULAR; INTRAVENOUS PRN
Status: DISCONTINUED | OUTPATIENT
Start: 2022-07-11 | End: 2022-07-11 | Stop reason: SDUPTHER

## 2022-07-11 RX ORDER — DEXAMETHASONE SODIUM PHOSPHATE 10 MG/ML
INJECTION, EMULSION INTRAMUSCULAR; INTRAVENOUS PRN
Status: DISCONTINUED | OUTPATIENT
Start: 2022-07-11 | End: 2022-07-11 | Stop reason: SDUPTHER

## 2022-07-11 RX ORDER — LIDOCAINE HYDROCHLORIDE 20 MG/ML
INJECTION, SOLUTION INTRAVENOUS PRN
Status: DISCONTINUED | OUTPATIENT
Start: 2022-07-11 | End: 2022-07-11 | Stop reason: SDUPTHER

## 2022-07-11 RX ORDER — PROMETHAZINE HYDROCHLORIDE 25 MG/ML
6.25 INJECTION, SOLUTION INTRAMUSCULAR; INTRAVENOUS EVERY 6 HOURS PRN
Status: DISCONTINUED | OUTPATIENT
Start: 2022-07-11 | End: 2022-07-13 | Stop reason: HOSPADM

## 2022-07-11 RX ORDER — SODIUM CHLORIDE 0.9 % (FLUSH) 0.9 %
5-40 SYRINGE (ML) INJECTION EVERY 12 HOURS SCHEDULED
Status: DISCONTINUED | OUTPATIENT
Start: 2022-07-11 | End: 2022-07-11 | Stop reason: HOSPADM

## 2022-07-11 RX ORDER — SCOLOPAMINE TRANSDERMAL SYSTEM 1 MG/1
1 PATCH, EXTENDED RELEASE TRANSDERMAL
Status: DISCONTINUED | OUTPATIENT
Start: 2022-07-14 | End: 2022-07-13 | Stop reason: HOSPADM

## 2022-07-11 RX ORDER — PROPOFOL 10 MG/ML
INJECTION, EMULSION INTRAVENOUS PRN
Status: DISCONTINUED | OUTPATIENT
Start: 2022-07-11 | End: 2022-07-11 | Stop reason: SDUPTHER

## 2022-07-11 RX ORDER — METOCLOPRAMIDE HYDROCHLORIDE 5 MG/ML
10 INJECTION INTRAMUSCULAR; INTRAVENOUS EVERY 6 HOURS PRN
Status: DISCONTINUED | OUTPATIENT
Start: 2022-07-11 | End: 2022-07-13 | Stop reason: HOSPADM

## 2022-07-11 RX ORDER — HYOSCYAMINE SULFATE 0.125 MG
125 TABLET,DISINTEGRATING ORAL EVERY 4 HOURS PRN
Status: DISCONTINUED | OUTPATIENT
Start: 2022-07-11 | End: 2022-07-13 | Stop reason: HOSPADM

## 2022-07-11 RX ORDER — MORPHINE SULFATE 4 MG/ML
4 INJECTION, SOLUTION INTRAMUSCULAR; INTRAVENOUS
Status: DISCONTINUED | OUTPATIENT
Start: 2022-07-11 | End: 2022-07-13 | Stop reason: HOSPADM

## 2022-07-11 RX ORDER — FENTANYL CITRATE 50 UG/ML
50 INJECTION, SOLUTION INTRAMUSCULAR; INTRAVENOUS EVERY 5 MIN PRN
Status: COMPLETED | OUTPATIENT
Start: 2022-07-11 | End: 2022-07-11

## 2022-07-11 RX ORDER — METRONIDAZOLE 500 MG/100ML
500 INJECTION, SOLUTION INTRAVENOUS
Status: DISCONTINUED | OUTPATIENT
Start: 2022-07-11 | End: 2022-07-13 | Stop reason: HOSPADM

## 2022-07-11 RX ORDER — DEXTROSE MONOHYDRATE 50 MG/ML
100 INJECTION, SOLUTION INTRAVENOUS PRN
Status: DISCONTINUED | OUTPATIENT
Start: 2022-07-11 | End: 2022-07-13 | Stop reason: HOSPADM

## 2022-07-11 RX ORDER — MORPHINE SULFATE 2 MG/ML
2 INJECTION, SOLUTION INTRAMUSCULAR; INTRAVENOUS
Status: DISCONTINUED | OUTPATIENT
Start: 2022-07-11 | End: 2022-07-13 | Stop reason: HOSPADM

## 2022-07-11 RX ORDER — ENOXAPARIN SODIUM 100 MG/ML
40 INJECTION SUBCUTANEOUS EVERY 12 HOURS SCHEDULED
Status: DISCONTINUED | OUTPATIENT
Start: 2022-07-12 | End: 2022-07-13 | Stop reason: HOSPADM

## 2022-07-11 RX ORDER — SODIUM CHLORIDE 9 MG/ML
INJECTION, SOLUTION INTRAVENOUS PRN
Status: DISCONTINUED | OUTPATIENT
Start: 2022-07-11 | End: 2022-07-11 | Stop reason: HOSPADM

## 2022-07-11 RX ORDER — ROCURONIUM BROMIDE 10 MG/ML
INJECTION, SOLUTION INTRAVENOUS PRN
Status: DISCONTINUED | OUTPATIENT
Start: 2022-07-11 | End: 2022-07-11 | Stop reason: SDUPTHER

## 2022-07-11 RX ORDER — SODIUM CHLORIDE 9 MG/ML
INJECTION, SOLUTION INTRAVENOUS PRN
Status: DISCONTINUED | OUTPATIENT
Start: 2022-07-11 | End: 2022-07-13 | Stop reason: HOSPADM

## 2022-07-11 RX ORDER — DROPERIDOL 2.5 MG/ML
0.62 INJECTION, SOLUTION INTRAMUSCULAR; INTRAVENOUS EVERY 6 HOURS PRN
Status: DISCONTINUED | OUTPATIENT
Start: 2022-07-11 | End: 2022-07-11

## 2022-07-11 RX ORDER — INSULIN LISPRO 100 [IU]/ML
0-6 INJECTION, SOLUTION INTRAVENOUS; SUBCUTANEOUS
Status: DISCONTINUED | OUTPATIENT
Start: 2022-07-11 | End: 2022-07-13 | Stop reason: HOSPADM

## 2022-07-11 RX ORDER — SCOLOPAMINE TRANSDERMAL SYSTEM 1 MG/1
1 PATCH, EXTENDED RELEASE TRANSDERMAL
Status: DISCONTINUED | OUTPATIENT
Start: 2022-07-11 | End: 2022-07-11

## 2022-07-11 RX ADMIN — METOCLOPRAMIDE HYDROCHLORIDE 10 MG: 5 INJECTION INTRAMUSCULAR; INTRAVENOUS at 13:10

## 2022-07-11 RX ADMIN — FENTANYL CITRATE 50 MCG: 50 INJECTION INTRAMUSCULAR; INTRAVENOUS at 10:54

## 2022-07-11 RX ADMIN — HYDROMORPHONE HYDROCHLORIDE 0.5 MG: 1 INJECTION, SOLUTION INTRAMUSCULAR; INTRAVENOUS; SUBCUTANEOUS at 11:10

## 2022-07-11 RX ADMIN — MIDAZOLAM 2 MG: 1 INJECTION INTRAMUSCULAR; INTRAVENOUS at 09:21

## 2022-07-11 RX ADMIN — SODIUM CHLORIDE, PRESERVATIVE FREE 10 ML: 5 INJECTION INTRAVENOUS at 20:00

## 2022-07-11 RX ADMIN — PROPOFOL 200 MG: 10 INJECTION, EMULSION INTRAVENOUS at 09:25

## 2022-07-11 RX ADMIN — LIDOCAINE HYDROCHLORIDE 80 MG: 20 INJECTION, SOLUTION INTRAVENOUS at 09:25

## 2022-07-11 RX ADMIN — SODIUM CHLORIDE: 9 INJECTION, SOLUTION INTRAVENOUS at 10:54

## 2022-07-11 RX ADMIN — ROCURONIUM BROMIDE 50 MG: 50 INJECTION, SOLUTION INTRAVENOUS at 09:25

## 2022-07-11 RX ADMIN — MORPHINE SULFATE 4 MG: 4 INJECTION, SOLUTION INTRAMUSCULAR; INTRAVENOUS at 13:10

## 2022-07-11 RX ADMIN — CIPROFLOXACIN 400 MG: 2 INJECTION, SOLUTION INTRAVENOUS at 09:36

## 2022-07-11 RX ADMIN — DEXAMETHASONE SODIUM PHOSPHATE 10 MG: 10 INJECTION, EMULSION INTRAMUSCULAR; INTRAVENOUS at 09:38

## 2022-07-11 RX ADMIN — FENTANYL CITRATE 50 MCG: 50 INJECTION, SOLUTION INTRAMUSCULAR; INTRAVENOUS at 11:40

## 2022-07-11 RX ADMIN — SODIUM CHLORIDE: 9 INJECTION, SOLUTION INTRAVENOUS at 05:59

## 2022-07-11 RX ADMIN — FAMOTIDINE 20 MG: 10 INJECTION, SOLUTION INTRAVENOUS at 07:10

## 2022-07-11 RX ADMIN — HYDROMORPHONE HYDROCHLORIDE 0.5 MG: 1 INJECTION, SOLUTION INTRAMUSCULAR; INTRAVENOUS; SUBCUTANEOUS at 11:20

## 2022-07-11 RX ADMIN — INSULIN LISPRO 1 UNITS: 100 INJECTION, SOLUTION INTRAVENOUS; SUBCUTANEOUS at 20:02

## 2022-07-11 RX ADMIN — MORPHINE SULFATE 4 MG: 4 INJECTION, SOLUTION INTRAMUSCULAR; INTRAVENOUS at 16:44

## 2022-07-11 RX ADMIN — FENTANYL CITRATE 50 MCG: 50 INJECTION, SOLUTION INTRAMUSCULAR; INTRAVENOUS at 11:46

## 2022-07-11 RX ADMIN — KETOROLAC TROMETHAMINE 15 MG: 30 INJECTION, SOLUTION INTRAMUSCULAR; INTRAVENOUS at 15:56

## 2022-07-11 RX ADMIN — INSULIN LISPRO 2 UNITS: 100 INJECTION, SOLUTION INTRAVENOUS; SUBCUTANEOUS at 17:22

## 2022-07-11 RX ADMIN — METRONIDAZOLE 500 MG: 500 INJECTION, SOLUTION INTRAVENOUS at 09:50

## 2022-07-11 RX ADMIN — FENTANYL CITRATE 100 MCG: 50 INJECTION INTRAMUSCULAR; INTRAVENOUS at 09:25

## 2022-07-11 RX ADMIN — SODIUM CHLORIDE: 9 INJECTION, SOLUTION INTRAVENOUS at 18:43

## 2022-07-11 RX ADMIN — DROPERIDOL 0.62 MG: 2.5 INJECTION, SOLUTION INTRAMUSCULAR; INTRAVENOUS at 11:15

## 2022-07-11 RX ADMIN — FENTANYL CITRATE 50 MCG: 50 INJECTION INTRAMUSCULAR; INTRAVENOUS at 09:41

## 2022-07-11 RX ADMIN — FENTANYL CITRATE 50 MCG: 50 INJECTION INTRAMUSCULAR; INTRAVENOUS at 09:46

## 2022-07-11 RX ADMIN — SUGAMMADEX 200 MG: 100 INJECTION, SOLUTION INTRAVENOUS at 10:33

## 2022-07-11 RX ADMIN — IPRATROPIUM BROMIDE AND ALBUTEROL SULFATE 1 AMPULE: .5; 3 SOLUTION RESPIRATORY (INHALATION) at 07:12

## 2022-07-11 RX ADMIN — KETOROLAC TROMETHAMINE 15 MG: 30 INJECTION, SOLUTION INTRAMUSCULAR; INTRAVENOUS at 19:59

## 2022-07-11 RX ADMIN — FENTANYL CITRATE 50 MCG: 50 INJECTION, SOLUTION INTRAMUSCULAR; INTRAVENOUS at 11:25

## 2022-07-11 RX ADMIN — INSULIN HUMAN 4 UNITS: 100 INJECTION, SOLUTION PARENTERAL at 11:22

## 2022-07-11 RX ADMIN — FENTANYL CITRATE 50 MCG: 50 INJECTION, SOLUTION INTRAMUSCULAR; INTRAVENOUS at 11:30

## 2022-07-11 ASSESSMENT — PAIN SCALES - GENERAL
PAINLEVEL_OUTOF10: 7
PAINLEVEL_OUTOF10: 9
PAINLEVEL_OUTOF10: 3
PAINLEVEL_OUTOF10: 7
PAINLEVEL_OUTOF10: 9
PAINLEVEL_OUTOF10: 7
PAINLEVEL_OUTOF10: 7
PAINLEVEL_OUTOF10: 9
PAINLEVEL_OUTOF10: 3
PAINLEVEL_OUTOF10: 0
PAINLEVEL_OUTOF10: 7
PAINLEVEL_OUTOF10: 7
PAINLEVEL_OUTOF10: 8

## 2022-07-11 ASSESSMENT — PAIN DESCRIPTION - LOCATION
LOCATION: ABDOMEN

## 2022-07-11 ASSESSMENT — PAIN DESCRIPTION - ONSET: ONSET: ON-GOING

## 2022-07-11 ASSESSMENT — PAIN DESCRIPTION - DESCRIPTORS
DESCRIPTORS: SORE
DESCRIPTORS: BURNING;STABBING
DESCRIPTORS: SORE

## 2022-07-11 ASSESSMENT — PAIN DESCRIPTION - ORIENTATION
ORIENTATION: RIGHT
ORIENTATION: MID

## 2022-07-11 ASSESSMENT — PAIN DESCRIPTION - FREQUENCY: FREQUENCY: CONTINUOUS

## 2022-07-11 ASSESSMENT — PAIN DESCRIPTION - PAIN TYPE: TYPE: ACUTE PAIN;SURGICAL PAIN

## 2022-07-11 ASSESSMENT — PAIN - FUNCTIONAL ASSESSMENT: PAIN_FUNCTIONAL_ASSESSMENT: ACTIVITIES ARE NOT PREVENTED

## 2022-07-11 NOTE — OP NOTE
Operative Note                              [facility]                         Operative Report      Pt Name: Merle Dukes  MRN: [id number]  YOB: 1975  Surgeon: Rosa Cesar MD FACS  Primary Care Physician: KATHERIN Brooks - CNP  Procedure Date: 7/11/2022     PREOPERATIVE DIAGNOSES:  1.  Morbid obesity  2. BMI 45  3. DM     POSTOPERATIVE DIAGNOSES: Same      PROCEDURE:  Robotic sleeve gastrectomy. SURGEON:  Rosa Cesar M.D. FACS     ASSISTANT:  Cecy Duarte. Venegas, ITA     ANESTHESIA:  General/local.     ESTIMATED BLOOD LOSS:  10 ml     DRAINS:  None     COMPLICATIONS:  None     DISPOSITION:  Stable to the recovery room     SPECIMEN:  Stomach. INDICATIONS FOR PROCEDURE:  The patient is a 51 y.o.-old female who had  been seen in the weight management program secondary to her morbid obesity. She was not able to lose enough adequate excess body weight with medical  management only, and wished to proceed with a robotic sleeve gastrectomy. Risks of surgery were then further discussed. Some of the risks included  but were not limited to bleeding, infection, the need for reoperation,  severe chronic postoperative pain or numbness, major vascular or nerve  injury, cardiopulmonary complications, anesthetic complications,  seroma/hematoma formation, wound breakdown, trocar site herniation, staple  line breakdown/leak, staple line bleed, sleeve stricture, chronic nausea, chronic  pain, and death. After all of the questions were answered in their  entirety and the patient was completely aware of the current situation, she  elected to proceed with the procedure. DESCRIPTION OF THE PROCEDURE:  After informed consent was signed and placed  on the chart, the patient was taken back to the operating room and placed  supine on the operating room table. General anesthesia was induced. She  tolerated this well throughout the case. All pressure points were padded.  She was on preoperative antibiotics. Bilateral lower extremity sequential  compression devices were placed prior to incision. Her abdomen and pelvis  were prepped and draped in the usual sterile standard fashion. A time-out  occurred prior to the operation, which not only identified the patient but  also the planned procedure to be performed. At the end of the time-out,  there were no questions or concerns. I began the operation first by making a small transverse incision just  above and to the right of the umbilicus. Using low-flow insufflation and  the OptiView, the abdomen was entered into without difficulty. The  intra-abdominal cavity was insufflated to a pressure of approximately 15  mmHg with carbon dioxide gas. The patient tolerated the insufflation well. Three separate 8-mm trocars were placed in their standard location under  direct vision. A 5-mm trocar was placed in the far right lateral abdominal  region under direct vision. Liver retractor was brought in and placed  without difficult under direct vision. The 11 mm was then replaced by  12-mm robotic trocar for the stapler and specimen extraction. The patient was then  placed in reverse Trendelenburg. Robot brought in and docked. Instruments  were placed under direct vision. Once everything was aligned and in order,  I then unscrubbed and went back to the console. I began the operation by  first evaluating the hiatus. There appeared to be no significant hiatal  hernia. The lesser sac was entered into using the vessel sealer going  through the mesentery there on the greater curvature of the stomach. Mesentery here was then unzipped distally to about 5 to 6 cm proximal from the  pylorus. It was then unzipped proximal up and around the fundus to the  left lucio. Short gastric vessels were taken down. Spleen preserved. Hemostasis adequate. Here, it was confirmed there was no significant  hiatal hernia.   After it was assured that the fundus and the upper stomach  was completely mobilized, the bougie 40-Singaporean was then placed under direct  vision and positioned with the help of anesthesia. This was then placed to suction. First firing was  a 61 robotic black reinforced SEAMGUARD cartridge. Care was taken to hug the bougie  but also not to stricture down the angle of the stomach. There were several firings of the 60-mm robotic green reinforced SEAMGUARD  cartridges. All of these hugged the bougie. Last firing was a 60 mm robotic green reinforced SEAMGUARD  cartridge. Here, care was taken to ensure taking all of  the excess fundus, but also not to stricture down or encroach on the GE  junction. Staple line was then tested under irrigation with air  insufflation through the bougie and clamped distally. No air bubbles or  any kind of concern for leak was identified. Irrigation was then removed. Bougie taken off of suction and removed under direct visualization. 0 Ethibond suture was then placed through the  gastric remnant and brought up to the 12-mm trocar site. Instruments were  removed. Robot undocked. I then scrubbed back into the case. Gastric  remnant was removed and sent to Pathology for permanent. Using a Storz  suture passer and 0 Vicryl suture, this was placed through the fascia there  around the 12-mm trocar site. This was tied and at the completion of this,  there were no fascial defects. Liver retractor removed. Other trocars  were then removed under direct vision. Hemostasis was adequate. The  patient tolerated desufflation well. Vicryl suture 4-0 was used in a  subcuticular fashion in all the open incisions for skin closure. Closed  incisions were then cleaned, dried, and Steri-Strips applied. Marcaine  0.5% with epinephrine was used for local anesthetic. The patient tolerated  the injection of local anesthetic without difficulty.   Sponge, needle and  instrumentation counts were correct at the end of the procedure. The  patient tolerated the procedure well with no apparent complications and  only about 10 mL of blood loss. She was later brought out of  general anesthesia and then transferred to postanesthesia care unit in  stable condition. Loni Daniels M.D.  FACS  Electronically signed 7/11/2022 at 10:37 AM

## 2022-07-11 NOTE — PROGRESS NOTES
ADMITTED TO [facility] AND ORIENTED TO UNIT. SCDS ON. FALL AND ALLERGY BANDS ON. PT VERBALIZED APPROVAL FOR FIRST NAME, LAST INITIAL AND PHYSICIAN NAME ON UNIT WHITEBOARD. Spouse, Rani Whitehead with the patient.

## 2022-07-11 NOTE — PLAN OF CARE
Care plan initiated. Care plan reviewed with patient and her . Patient and her  verbalize understanding of the plan of care and contribute to goal setting.

## 2022-07-11 NOTE — BRIEF OP NOTE
Brief Postoperative Note      Patient: Velia Lazo  YOB: 1975  MRN: [id number]    Date of Procedure: 7/11/2022    Pre-Op Diagnosis: 1. Morbid Obesity  2. BMI 45 3.  DM    Post-Op Diagnosis: Same       Procedure(s):  Robotic Sleeve Gastrectomy    Surgeon(s):  Samreen Aviles MD    Assistant:  First Assistant: Kuldeep Leigh RN    Anesthesia: General/local    Estimated Blood Loss (mL): 10 ml    Complications: None    Specimens:   ID Type Source Tests Collected by Time Destination   A : GASTRIC REMNANT Tissue Stomach SURGICAL PATHOLOGY Samreen Aviles MD 7/11/2022 1031        Implants:  * No implants in log *      Drains: * No LDAs found *    Findings: as above - see op note for details    Electronically signed by Samreen Aviles MD on 7/11/2022 at 10:33 AM

## 2022-07-11 NOTE — INTERVAL H&P NOTE
Update History & Physical    The patient's History and Physical was reviewed with the patient and I examined the patient. There was no change. The surgical site was confirmed by the patient and me. Plan: The risks, benefits, expected outcome, and alternative to the recommended procedure have been discussed with the patient. Patient understands and wants to proceed with the procedure. The patient was counseled at length about the risks of miguel angel Covid-19 during their perioperative period and any recovery window from their procedure. The patient was made aware that miguel angel Covid-19  may worsen their prognosis for recovering from their procedure  and lend to a higher morbidity and/or mortality risk. All material risks, benefits, and reasonable alternatives including postponing the procedure were discussed. The patient does wish to proceed with the procedure at this time.     Electronically signed by Anam Norwood MD on 7/11/2022 at 6:10 AM

## 2022-07-11 NOTE — ANESTHESIA POSTPROCEDURE EVALUATION
Department of Anesthesiology  Postprocedure Note    Patient: Gypsy Pemberton  MRN: [id number]  YOB: 1975  Date of evaluation: 7/11/2022      Procedure Summary     Date: 07/11/22 Room / Location: [address] GREGORIO Delgado    Anesthesia Start: 0921 Anesthesia Stop: 1103    Procedure: Robotic Sleeve Gastrectomy (N/A Abdomen) Diagnosis:       Class 3 severe obesity with body mass index (BMI) of 45.0 to 49.9 in adult, unspecified obesity type, unspecified whether serious comorbidity present (HCC)      (Class 3 severe obesity with body mass index (BMI) of 45.0 to 49.9 in adult, unspecified obesity type, unspecified whether serious comorbidity present ([facility] [E66.01, Z68.42])    Surgeons: Kurt Downing MD Responsible Provider: Daniel Russell DO    Anesthesia Type: general ASA Status: 3          Anesthesia Type: No value filed.     Latasha Phase I: Latasha Score: 8    Latasha Phase II:        Anesthesia Post Evaluation    Patient location during evaluation: PACU  Patient participation: complete - patient participated  Level of consciousness: awake and alert  Airway patency: patent  Nausea & Vomiting: no vomiting and no nausea  Complications: no  Cardiovascular status: hemodynamically stable  Respiratory status: acceptable  Hydration status: stable

## 2022-07-11 NOTE — PROGRESS NOTES
1056 Pt arrives to recovery responsive to verbal stimulation. Pt respirations are unlabored on 8 L simple mask. Pt VSS. Pt states she is in pain. Pt medicated by Andrae Pressley CRNA for pain   1110 Pt states her pain is a 9/10. Pt medicated with dilaudid for pain. 1120 Pt states her pain remains a 9/10. Pt medicated with dilaudid. Pt respirations are unlabored. VSS  1115 Pt states she is also nauseated. Pt medicated with droperidol. 1122 Pt medicated for BS of 215 with 4 units of insulin   1125 Pt states her pain is a 7/10. Pt medicated with fentanyl for pain   1130 Pt states her pain remains a 7/10. Pt medicated with fentanyl for pain. Pt respirations are unlabored. Pt VSS. 1140 Pt states her pain remains 7/10. Pt medicated with fentanyl   1145 Pt medicated with fentanyl for 7/10 pain. Respirations unlabored. VSS  1156 Pt resting with snoring respirations. Pt respirations are unlabored.  VSS  1206 report called to [facility] rn, pt meets criteria for discharge from recovery  1216 Pt status remains unchanged  1226 Pt departing pacu in stable condition with transport

## 2022-07-11 NOTE — PROGRESS NOTES
Pt admitted to  7K23 via bed. Complaints: gastric surgery. IV normal saline infusing into the hand left, condition patent and no redness at a rate of 100 mls/ hour with about 700 mls in the bag still. IV site free of s/s of infection or infiltration. Vital signs obtained. Assessment and data collection initiated. Two nurse skin assessment performed by Ana Cristina TEJEDA and Shonda Stallworth RN. Oriented to room. Explained patients right to have family, representative or physician notified of their admission. Patient has N/A for physician to be notified. Patient has N/A for family/representative to be notified. The patient is interested in [facility]. [facility]s meds to beds program?:  Yes    Policies and procedures for 7K explained. All questions answered with no further questions at this time. Fall prevention and safety brochure discussed with patient. Bed alarm on. Call light in reach.

## 2022-07-11 NOTE — ANESTHESIA PRE PROCEDURE
Department of Anesthesiology  Preprocedure Note       Name:  Mandy Gonzalez   Age:  51 y.o.  :  1975                                          MRN:  [id number]         Date:  2022      Surgeon: Zachary Peterson):  Ruslan Meng MD    Procedure: Procedure(s):  Robotic Sleeve Gastrectomy    Medications prior to admission:   Prior to Admission medications    Medication Sig Start Date End Date Taking? Authorizing Provider   Docusate Sodium 100 MG TABS Take 100 mg by mouth 2 times daily Take as needed to prevent constipation 22   DANIELITO Mcdaniel   enoxaparin (LOVENOX) 40 MG/0.4ML Inject 0.4 mLs into the skin daily 22   DANIELITO Mcdaniel   metoclopramide (REGLAN) 10 MG tablet Take 1 tablet by mouth every 6 hours as needed (nausea/vomiting) 22  DANIELITO Mcdaniel   PARoxetine (PAXIL) 10 MG tablet Take 20 mg by mouth every morning    Historical Provider, MD   calcium carbonate (OSCAL) 500 MG TABS tablet Take 500 mg by mouth daily    Historical Provider, MD   melatonin 3 MG TABS tablet Take 1 tablet by mouth nightly as needed (Insomnia)  Patient not taking: Reported on 2022 3/2/22 3/2/23  Amish Nino MD   Multiple Vitamins-Minerals (THERAPEUTIC MULTIVITAMIN-MINERALS) tablet Take 1 tablet by mouth daily    Historical Provider, MD   CPAP Machine MISC by Does not apply route Please check patient's CPAP machine for proper functioning by [employer]. She was prescribed with a CPAP with a pressure of 11 cm of water with room air.  10/20/21   Amish Nino MD   traZODone (DESYREL) 150 MG tablet Take 150 mg by mouth nightly    Historical Provider, MD   Dulaglutide (TRULICITY) 5.62 GL/5.9WC SOPN Inject 0.75 mg into the skin once a week Taking 1.5    Historical Provider, MD   omeprazole (PRILOSEC) 40 MG delayed release capsule Take 1 capsule by mouth daily 21   Historical Provider, MD   oxybutynin (DITROPAN-XL) 5 MG extended release tablet Take 1 tablet by mouth daily  7/28/21   Historical Provider, MD   atorvastatin (LIPITOR) 40 MG tablet TAKE ONE TABLET BY MOUTH DAILY 8/15/18   KATHERIN Bran CNP   metFORMIN (GLUCOPHAGE) 1000 MG tablet TAKE ONE TABLET BY MOUTH TWICE A DAY WITH MEALS  Patient taking differently: See Admin Instructions 500 mg in AM, 1000mg in PM. 8/15/18   KATHERIN Bran CNP   Blood Glucose Monitoring Suppl (ACCU-CHEK ROD PLUS) w/Device KIT Check blood sugar once daily 2/21/18   KATHERIN Bar   glucose blood VI test strips (ACCU-CHEK ROD PLUS) strip Check blood sugar once daily 2/21/18   KATHERIN Bar   Lancets MISC Use to test blood glucose level 1 time per day.  Diagnosis: E11.9 8/29/17   KATHERIN Bran CNP   Cetirizine HCl (ZYRTEC ALLERGY) 10 MG CAPS Take 1 tablet by mouth daily    Historical Provider, MD   montelukast (SINGULAIR) 10 MG tablet Take 10 mg by mouth nightly    Historical Provider, MD   rOPINIRole (REQUIP) 0.25 MG tablet Take 4 mg by mouth nightly     Historical Provider, MD   albuterol sulfate  (90 BASE) MCG/ACT inhaler Inhale 2 puffs into the lungs every 6 hours as needed for Wheezing    Historical Provider, MD       Current medications:    Current Facility-Administered Medications   Medication Dose Route Frequency Provider Last Rate Last Admin    ciprofloxacin (CIPRO) IVPB 400 mg  400 mg IntraVENous 90 Min Pre-Op Stacy Stevens RPH        metronidazole (FLAGYL) 500 mg in 0.9% NaCl 100 mL IVPB premix  500 mg IntraVENous 60 Min Pre-Op Stacy Stevens RP        famotidine (PEPCID) 20 mg in sodium chloride (PF) 10 mL injection  20 mg IntraVENous Once [employer], [place]        scopolamine (TRANSDERM-SCOP) transdermal patch 1 patch  1 patch TransDERmal Q72H [employer], [place]        sodium chloride flush 0.9 % injection 5-40 mL  5-40 mL IntraVENous 2 times per day [employer], [place]        sodium chloride flush 0.9 % injection 5-40 mL  5-40 mL IntraVENous PRN Olu Fowler [place]        0.9 % sodium chloride infusion   IntraVENous Continuous Julissa Diaz MD           Allergies:     Allergies   Allergen Reactions    Latex Rash    Keflex [Cephalexin] Rash    Zofran [Ondansetron] Hallucinations    Betadine [Povidone Iodine] Itching and Swelling     Iv site - main area from parekh catheter    Bacitracin Rash    Neosporin [Neomycin-Polymyxin-Gramicidin] Rash    Pcn [Penicillins] Hives, Swelling and Rash    Rocephin [Ceftriaxone] Rash       Problem List:    Patient Active Problem List   Diagnosis Code    Dyslipidemia E78.5    Cellulitis of lower extremity  bilateral L03.119    Allergic drug reaction T78.40XA    Myalgia  lower wxtremities M79.10    Staphylococcal skin disorder L08.9, B95.8    Type 2 diabetes mellitus without complication, without long-term current use of insulin (HCC) E11.9    RLS (restless legs syndrome) G25.81    Multinodular goiter E04.2    DARIEN on CPAP G47.33, Z99.89    Mild intermittent asthma without complication G10.93       Past Medical History:        Diagnosis Date    Allergic rhinitis     Anxiety     Asthma     Depression     Diabetes (HCC)     Hyperlipidemia     Psychiatric problem     depression    Sleep apnea        Past Surgical History:        Procedure Laterality Date    CARPAL TUNNEL RELEASE       SECTION      HYSTERECTOMY (CERVIX STATUS UNKNOWN)      KNEE ARTHROSCOPY         Social History:    Social History     Tobacco Use    Smoking status: Never Smoker    Smokeless tobacco: Never Used   Substance Use Topics    Alcohol use: Yes     Comment: socially                                Counseling given: Not Answered      Vital Signs (Current):   Vitals:    22 0600 22 0610   BP: 119/77    Pulse: 83    Resp: 18    Temp: 97.2 °F (36.2 °C)    TempSrc: Temporal    SpO2: 95%    Weight:  269 lb (122 kg)   Height:  5' 4.5\" (1.638 m) BP Readings from Last 3 Encounters:   07/11/22 119/77   05/23/22 120/70   05/02/22 129/82       NPO Status: Time of last liquid consumption: 0030                        Time of last solid consumption: 0030                        Date of last liquid consumption: 07/11/22                        Date of last solid food consumption: 07/11/22    BMI:   Wt Readings from Last 3 Encounters:   07/11/22 269 lb (122 kg)   06/27/22 272 lb 6.4 oz (123.6 kg)   05/23/22 276 lb (125.2 kg)     Body mass index is 45.46 kg/m².     CBC:   Lab Results   Component Value Date/Time    WBC 8.7 05/02/2022 09:00 PM    RBC 4.19 05/02/2022 09:00 PM    HGB 11.9 05/02/2022 09:00 PM    HCT 37.2 05/02/2022 09:00 PM    MCV 88.8 05/02/2022 09:00 PM    RDW 15.0 07/27/2017 06:33 AM     05/02/2022 09:00 PM       CMP:   Lab Results   Component Value Date/Time     05/02/2022 09:00 PM    K 4.0 05/02/2022 09:00 PM    K 4.3 12/05/2020 01:20 PM     05/02/2022 09:00 PM    CO2 26 05/02/2022 09:00 PM    BUN 9 05/02/2022 09:00 PM    CREATININE 0.5 05/02/2022 09:00 PM    LABGLOM >90 05/02/2022 09:00 PM    GLUCOSE 164 05/02/2022 09:00 PM    PROT 7.0 05/02/2022 09:00 PM    CALCIUM 9.1 05/02/2022 09:00 PM    BILITOT 0.4 05/02/2022 09:00 PM    ALKPHOS 85 05/02/2022 09:00 PM    AST 19 05/02/2022 09:00 PM    ALT 30 05/02/2022 09:00 PM       POC Tests:   Recent Labs     07/11/22  0621   POCGLU 141*       Coags:   Lab Results   Component Value Date/Time    INR 0.93 10/04/2021 11:07 AM    APTT 31.9 10/04/2021 11:07 AM       HCG (If Applicable): No results found for: PREGTESTUR, PREGSERUM, HCG, HCGQUANT     ABGs: No results found for: PHART, PO2ART, ZEA1GVJ, IFW0TJF, BEART, A9GVMGQF     Type & Screen (If Applicable):  No results found for: LABABO, LABRH    Drug/Infectious Status (If Applicable):  No results found for: HIV, HEPCAB    COVID-19 Screening (If Applicable): No results found for: COVID19        Anesthesia Evaluation   no history of anesthetic complications:   Airway: Mallampati: I  TM distance: >3 FB   Neck ROM: full  Mouth opening: > = 3 FB   Dental:          Pulmonary:   (+) sleep apnea: on CPAP,  decreased breath sounds asthma:                            Cardiovascular:  Exercise tolerance: good (>4 METS),       (-) hypertension, past MI and CAD                Neuro/Psych:      (-) seizures and CVA           GI/Hepatic/Renal:   (+) GERD: well controlled, morbid obesity     (-) liver disease and no renal disease       Endo/Other:    (+) DiabetesType II DM, , .    (-) hypothyroidism, hyperthyroidism               Abdominal:   (+) obese,           Vascular:     - DVT and PE. Other Findings:           Anesthesia Plan      general     ASA 3     (GETA. PIV. Additional access can be obtained after induction if needed. Standard ASA monitors. IV/PO opioids and other adjuncts as needed for pain control. PACU post op for recovery. Possible anesthetics complications were discussed with the patient, including but not limited to: PONV, damage to the airway and surrounding structures (teeth, lips, gums, tongue, etc.), adverse reactions to medicine, cardiac complications (MI, CHF, arrhythmias, etc.), respiratory complications (post-op ventilation, respiratory failure, etc.), neurologic complications (nerve damage, stroke, seizure), and death. The patient was given the opportunity to ask questions and all questions were answered to the patient's satisfaction. The patient is in agreement with the anesthetic plan. Preop Duoneb tx)  Induction: intravenous. Anesthetic plan and risks discussed with patient and spouse. Plan discussed with CRNA.                     Chema Hart DO   7/11/2022

## 2022-07-12 ENCOUNTER — APPOINTMENT (OUTPATIENT)
Dept: GENERAL RADIOLOGY | Age: 47
DRG: 403 | End: 2022-07-12
Attending: SURGERY
Payer: COMMERCIAL

## 2022-07-12 PROBLEM — Z90.3 S/P GASTRIC SLEEVE PROCEDURE: Status: ACTIVE | Noted: 2022-07-12

## 2022-07-12 LAB
ANION GAP SERPL CALCULATED.3IONS-SCNC: 13 MEQ/L (ref 8–16)
BUN BLDV-MCNC: 9 MG/DL (ref 7–22)
CALCIUM SERPL-MCNC: 8.1 MG/DL (ref 8.5–10.5)
CHLORIDE BLD-SCNC: 103 MEQ/L (ref 98–111)
CO2: 19 MEQ/L (ref 23–33)
CREAT SERPL-MCNC: 0.4 MG/DL (ref 0.4–1.2)
GFR SERPL CREATININE-BSD FRML MDRD: > 90 ML/MIN/1.73M2
GLUCOSE BLD-MCNC: 165 MG/DL (ref 70–108)
GLUCOSE BLD-MCNC: 170 MG/DL (ref 70–108)
GLUCOSE BLD-MCNC: 178 MG/DL (ref 70–108)
GLUCOSE BLD-MCNC: 184 MG/DL (ref 70–108)
GLUCOSE BLD-MCNC: 190 MG/DL (ref 70–108)
HCT VFR BLD CALC: 37.7 % (ref 37–47)
HEMOGLOBIN: 11.3 GM/DL (ref 12–16)
POTASSIUM REFLEX MAGNESIUM: 3.8 MEQ/L (ref 3.5–5.2)
SODIUM BLD-SCNC: 135 MEQ/L (ref 135–145)

## 2022-07-12 PROCEDURE — A4641 RADIOPHARM DX AGENT NOC: HCPCS | Performed by: SURGERY

## 2022-07-12 PROCEDURE — 6360000004 HC RX CONTRAST MEDICATION: Performed by: SURGERY

## 2022-07-12 PROCEDURE — 74240 X-RAY XM UPR GI TRC 1CNTRST: CPT

## 2022-07-12 PROCEDURE — 85014 HEMATOCRIT: CPT

## 2022-07-12 PROCEDURE — 36415 COLL VENOUS BLD VENIPUNCTURE: CPT

## 2022-07-12 PROCEDURE — 82948 REAGENT STRIP/BLOOD GLUCOSE: CPT

## 2022-07-12 PROCEDURE — 99024 POSTOP FOLLOW-UP VISIT: CPT | Performed by: NURSE PRACTITIONER

## 2022-07-12 PROCEDURE — 6360000002 HC RX W HCPCS: Performed by: SURGERY

## 2022-07-12 PROCEDURE — 6360000002 HC RX W HCPCS: Performed by: NURSE PRACTITIONER

## 2022-07-12 PROCEDURE — 6370000000 HC RX 637 (ALT 250 FOR IP): Performed by: NURSE PRACTITIONER

## 2022-07-12 PROCEDURE — 80048 BASIC METABOLIC PNL TOTAL CA: CPT

## 2022-07-12 PROCEDURE — 6370000000 HC RX 637 (ALT 250 FOR IP): Performed by: SURGERY

## 2022-07-12 PROCEDURE — 2580000003 HC RX 258: Performed by: SURGERY

## 2022-07-12 PROCEDURE — 1200000000 HC SEMI PRIVATE

## 2022-07-12 PROCEDURE — 85018 HEMOGLOBIN: CPT

## 2022-07-12 RX ORDER — OXYBUTYNIN CHLORIDE 5 MG/1
5 TABLET, EXTENDED RELEASE ORAL DAILY
Status: DISCONTINUED | OUTPATIENT
Start: 2022-07-12 | End: 2022-07-13 | Stop reason: HOSPADM

## 2022-07-12 RX ORDER — PAROXETINE HYDROCHLORIDE 20 MG/1
20 TABLET, FILM COATED ORAL EVERY MORNING
Status: DISCONTINUED | OUTPATIENT
Start: 2022-07-12 | End: 2022-07-13 | Stop reason: HOSPADM

## 2022-07-12 RX ORDER — OXYCODONE HYDROCHLORIDE 5 MG/1
5 TABLET ORAL EVERY 6 HOURS PRN
Status: DISCONTINUED | OUTPATIENT
Start: 2022-07-12 | End: 2022-07-13 | Stop reason: HOSPADM

## 2022-07-12 RX ADMIN — DIATRIZOATE MEGLUMINE AND DIATRIZOATE SODIUM 30 ML: 600; 100 SOLUTION ORAL; RECTAL at 09:09

## 2022-07-12 RX ADMIN — BARIUM SULFATE 50 ML: 0.6 SUSPENSION ORAL at 09:08

## 2022-07-12 RX ADMIN — OXYBUTYNIN CHLORIDE 5 MG: 5 TABLET, EXTENDED RELEASE ORAL at 11:36

## 2022-07-12 RX ADMIN — MORPHINE SULFATE 2 MG: 2 INJECTION, SOLUTION INTRAMUSCULAR; INTRAVENOUS at 10:16

## 2022-07-12 RX ADMIN — SODIUM CHLORIDE: 9 INJECTION, SOLUTION INTRAVENOUS at 16:58

## 2022-07-12 RX ADMIN — SODIUM CHLORIDE: 9 INJECTION, SOLUTION INTRAVENOUS at 08:15

## 2022-07-12 RX ADMIN — ENOXAPARIN SODIUM 40 MG: 100 INJECTION SUBCUTANEOUS at 21:44

## 2022-07-12 RX ADMIN — ENOXAPARIN SODIUM 40 MG: 100 INJECTION SUBCUTANEOUS at 08:03

## 2022-07-12 RX ADMIN — HYOSCYAMINE SULFATE 125 MCG: 0.12 TABLET, ORALLY DISINTEGRATING ORAL at 08:03

## 2022-07-12 RX ADMIN — INSULIN LISPRO 1 UNITS: 100 INJECTION, SOLUTION INTRAVENOUS; SUBCUTANEOUS at 11:36

## 2022-07-12 RX ADMIN — PAROXETINE HYDROCHLORIDE 20 MG: 20 TABLET, FILM COATED ORAL at 11:36

## 2022-07-12 RX ADMIN — HYOSCYAMINE SULFATE 125 MCG: 0.12 TABLET, ORALLY DISINTEGRATING ORAL at 14:43

## 2022-07-12 RX ADMIN — OXYCODONE 5 MG: 5 TABLET ORAL at 22:43

## 2022-07-12 RX ADMIN — KETOROLAC TROMETHAMINE 15 MG: 30 INJECTION, SOLUTION INTRAMUSCULAR; INTRAVENOUS at 08:03

## 2022-07-12 RX ADMIN — KETOROLAC TROMETHAMINE 15 MG: 30 INJECTION, SOLUTION INTRAMUSCULAR; INTRAVENOUS at 02:06

## 2022-07-12 RX ADMIN — INSULIN LISPRO 1 UNITS: 100 INJECTION, SOLUTION INTRAVENOUS; SUBCUTANEOUS at 16:56

## 2022-07-12 RX ADMIN — KETOROLAC TROMETHAMINE 15 MG: 30 INJECTION, SOLUTION INTRAMUSCULAR; INTRAVENOUS at 14:43

## 2022-07-12 RX ADMIN — INSULIN LISPRO 1 UNITS: 100 INJECTION, SOLUTION INTRAVENOUS; SUBCUTANEOUS at 08:02

## 2022-07-12 RX ADMIN — INSULIN LISPRO 1 UNITS: 100 INJECTION, SOLUTION INTRAVENOUS; SUBCUTANEOUS at 21:44

## 2022-07-12 ASSESSMENT — PAIN SCALES - GENERAL
PAINLEVEL_OUTOF10: 3
PAINLEVEL_OUTOF10: 2
PAINLEVEL_OUTOF10: 7
PAINLEVEL_OUTOF10: 2
PAINLEVEL_OUTOF10: 4
PAINLEVEL_OUTOF10: 3
PAINLEVEL_OUTOF10: 8

## 2022-07-12 ASSESSMENT — PAIN DESCRIPTION - LOCATION: LOCATION: ABDOMEN

## 2022-07-12 NOTE — PROGRESS NOTES
[facility] - Dr. Misty Cunningham [facility]  Postoperative Bariatric Progress Note    Pt Name: Jony Wallis  Medical Record Number: [id number]  Date of Birth 1975   Today's Date: 7/12/2022    ASSESSMENT   1. POD # 1 S/p robotic assisted sleeve gastrectomy  2. Morbid obesity (BMI 45)  3. Diabetes Mellitus    has a past medical history of Allergic rhinitis, Anxiety, Asthma, Depression, Diabetes ([address] Hyperlipidemia, Psychiatric problem, and Sleep apnea. PLAN   1. Upper GI reviewed. No evidence of leak or obstruction. 2. Okay to advance to sugar free clear liquids. Strict I&Os. 3. IV hydration  4. Urinating spontaneously   5. DVT & GI prophylaxis  6. Routine incisional care  7. Pain & nausea control  8. Stool softeners as needed  9. Ambulation every 1-2 hours  10. Resume home medications  11. Labs reviewed   12. Clinically, looks good. Home later today if tolerating liquids well. Follow up in 1 week in weight management. SUBJECTIVE   Patient was stable overnight. Chart reviewed. Updated by nursing staff. VS stable. No fevers. Denies chest discomfort or dyspnea. No N/V; (+) belching, flatus. No BM. Tolerating BARIATRIC DIET; Bariatric Clear Liquid diet. Pain controlled with analgesia. Up ad ursula. Urinating without complaints. Incisions are clean, dry and intact.    CURRENT MEDICATIONS   Scheduled Meds:   PARoxetine  20 mg Oral QAM    oxybutynin  5 mg Oral Daily    ciprofloxacin  400 mg IntraVENous 90 Min Pre-Op    metroNIDAZOLE  500 mg IntraVENous 60 Min Pre-Op    insulin lispro  0-6 Units SubCUTAneous TID     insulin lispro  0-3 Units SubCUTAneous Nightly    sodium chloride flush  5-40 mL IntraVENous 2 times per day    [START ON 7/14/2022] scopolamine  1 patch TransDERmal Q72H    enoxaparin  40 mg SubCUTAneous 2 times per day    ketorolac  15 mg IntraVENous Q6H     Continuous Infusions:   sodium chloride      sodium chloride      sodium chloride 125 mL/hr at 22 1843    dextrose       PRN Meds:.diatrizoate meglumine-sodium, oxyCODONE, hyoscyamine, sodium chloride flush, sodium chloride, morphine **OR** morphine, promethazine, promethazine, metoclopramide, glucose, dextrose bolus **OR** dextrose bolus, glucagon (rDNA), dextrose  OBJECTIVE   CURRENT VITALS:  height is 5' 4.5\" (1.638 m) and weight is 269 lb (122 kg). Her oral temperature is 98.4 °F (36.9 °C). Her blood pressure is 138/85 and her pulse is 91. Her respiration is 16 and oxygen saturation is 96%. Temperature Range (24h):Temp: 98.4 °F (36.9 °C) Temp  Av °F (36.7 °C)  Min: 97.5 °F (36.4 °C)  Max: 98.8 °F (37.1 °C)  BP Range (20I): Systolic (92FFG), FBV:669 , Min:129 , Y     Diastolic (67BCN), ZHK:64, Min:80, Max:102    Pulse Range (24h): Pulse  Av.6  Min: 86  Max: 108  Respiration Range (24h): Resp  Av.2  Min: 13  Max: 24  Current Pulse Ox (24h):  SpO2: 96 %  Pulse Ox Range (24h):  SpO2  Av.7 %  Min: 90 %  Max: 100 %  Oxygen Amount and Delivery: O2 Flow Rate (L/min): 2 L/min  Incentive Spirometry Tx:            GENERAL: alert, no distress  LUNGS: clear to ausculation, without wheezes, rales or rhonci  HEART: normal rate and regular rhythm  ABDOMEN: obese, non-distended, soft, incisional tenderness, bowel sounds hypo  INCISION: healing well, no significant drainage, no significant erythema  EXTERMITY: no cyanosis, clubbing or edema  In: 1240 [P.O.:240;  I.V.:1000]  Out: 25   Date 22 0000 - 22 2359   Shift 0056-4781 8720-1660 3055-9684 24 Hour Total   INTAKE   P.O. 0   0   Shift Total(mL/kg) 0(0)   0(0)   OUTPUT   Shift Total(mL/kg)       Weight (kg) 122 122 122 122     LABS     Recent Labs     22  0642   HGB 11.3*   HCT 37.7      K 3.8      CO2 19*   BUN 9   CREATININE 0.4   CALCIUM 8.1*      RADIOLOGY     PROCEDURE: FL UGI       CLINICAL INFORMATION: s/p sleeve gastrectomy .       TECHNIQUE:  image of the abdomen was done in the upright position. The patient was administered a small amount of Gastrografin. Followed by a small amount of thin barium. Fluoroscopic spot images done. One images done in the supine projection. Fluoroscopy time: 1.3 minutes   Images: 7       COMPARISON: No prior study.           FINDINGS:  image demonstrates moderate free air in the abdomen status post surgery. There was no obstruction to the passage of barium into the stomach remnant. There is no extravasation of contrast. Barium tablet through the small bowel without difficulty.           Impression   Unremarkable postop gastric sleeve no evidence of complication.               **This report has been created using voice recognition software.  It may contain minor errors which are inherent in voice recognition technology. **       Final report electronically signed by Dr. Flakita Carmen on 7/12/2022 9:17 AM     Electronically signed by KATHERIN Gutierrez CNP on 7/12/2022 at 10:42 AM     Patient seen and examined independently by me earlier this AM. Above discussed and I agree with Bonnie North CNP. See my additional comments below for updated orders and plan. Labs, cultures, and radiographs where available were reviewed. I discussed patient concerns with the patient's nurse and instructions were given. Please see our orders for the updated patient care plan. -Upper GI okay. Clear liquids. IV hydration. Ambulate. Urinating spontaneously. DVT prophylaxis. Home medications. Clinically, looks pretty good this morning. Most likely home later today. Follow-up in office in 1 week.     Electronically signed by Rosalio Wilson MD on 7/12/22 at 11:08 AM EDT

## 2022-07-12 NOTE — CARE COORDINATION
7/12/22, 12:26 PM EDT  DISCHARGE PLANNING EVALUATION:    Hermilo Coats       Admitted: 7/11/2022/ [address] [facility] day: 1   Location: [facility]23/023-A Reason for admit: Class 3 severe obesity with body mass index (BMI) of 45.0 to 49.9 in adult, unspecified obesity type, unspecified whether serious comorbidity present ([facility] [address] [E66.01, Z68.42]  Obesity [E66.9]  S/P gastric sleeve procedure [Z90.3]   PMH:  has a past medical history of Allergic rhinitis, Anxiety, Asthma, Depression, Diabetes ([facility] [address] Hyperlipidemia, Psychiatric problem, and Sleep apnea. Procedure: 7/11: Robotic sleeve gastrectomy. 7/12: UGI:Unremarkable postop gastric sleeve no evidence of complication  Barriers to Discharge:  Diet advanced to cl liq., pain and nausea contro  PCP: KATHERIN Hope CNP  Readmission Risk Score: 6.5 ( )%  Patient's Healthcare Decision Maker: Patient Declined (Legal Next of [address] [place] [address] as Decision Maker)    Patient Goals/Plan/Treatment Preferences: Spoke with pt and . She lives home with spouse. Has CPAP and walker at home. Independent with ADL's prior to admission. Plans to return home with . [facility] to follow-TAHIRA Ambrosio has contacted them. Transportation/Food Security/Housekeeping Addressed:  No issues identified. ANTICIPATE POSSIBLE DISCHARGE    7/12/22, 12:33 PM EDT    Patient goals/plan/ treatment preferences discussed by  and . Patient goals/plan/ treatment preferences reviewed with patient/ family. Patient/ family verbalize understanding of discharge plan and are in agreement with goal/plan/treatment preferences. Understanding was demonstrated using the teach back method. AVS provided by RN at time of discharge, which includes all necessary medical information pertaining to the patients current course of illness, treatment, post-discharge goals of care, and treatment preferences.      Services At/After Discharge: Home Health

## 2022-07-12 NOTE — PLAN OF CARE
Problem: Discharge Planning  Goal: Discharge to home or other facility with appropriate resources  Outcome: Progressing  Flowsheets (Taken 7/11/2022 2143)  Discharge to home or other facility with appropriate resources:   Identify barriers to discharge with patient and caregiver   Arrange for needed discharge resources and transportation as appropriate     Problem: Chronic Conditions and Co-morbidities  Goal: Patient's chronic conditions and co-morbidity symptoms are monitored and maintained or improved  Outcome: Progressing  Flowsheets (Taken 7/11/2022 2143)  Care Plan - Patient's Chronic Conditions and Co-Morbidity Symptoms are Monitored and Maintained or Improved:   Monitor and assess patient's chronic conditions and comorbid symptoms for stability, deterioration, or improvement   Collaborate with multidisciplinary team to address chronic and comorbid conditions and prevent exacerbation or deterioration     Problem: Pain  Goal: Verbalizes/displays adequate comfort level or baseline comfort level  Outcome: Progressing  Flowsheets (Taken 7/11/2022 2143)  Verbalizes/displays adequate comfort level or baseline comfort level:   Encourage patient to monitor pain and request assistance   Assess pain using appropriate pain scale   Administer analgesics based on type and severity of pain and evaluate response     Problem: Respiratory - Adult  Goal: Achieves optimal ventilation and oxygenation  Outcome: Progressing  Flowsheets (Taken 7/11/2022 2143)  Achieves optimal ventilation and oxygenation:   Assess for changes in respiratory status   Assess for changes in mentation and behavior   Position to facilitate oxygenation and minimize respiratory effort     Problem: Cardiovascular - Adult  Goal: Maintains optimal cardiac output and hemodynamic stability  Outcome: Progressing  Flowsheets (Taken 7/11/2022 2143)  Maintains optimal cardiac output and hemodynamic stability:   Monitor blood pressure and heart rate   Monitor urine output and notify Licensed Independent Practitioner for values outside of normal range     Problem: Skin/Tissue Integrity - Adult  Goal: Incisions, wounds, or drain sites healing without S/S of infection  Outcome: Progressing  Flowsheets (Taken 7/11/2022 2143)  Incisions, Wounds, or Drain Sites Healing Without Sign and Symptoms of Infection: TWICE DAILY: Assess and document skin integrity     Problem: Gastrointestinal - Adult  Goal: Minimal or absence of nausea and vomiting  Outcome: Progressing     Problem: Gastrointestinal - Adult  Goal: Maintains or returns to baseline bowel function  Outcome: Progressing     Problem: Genitourinary - Adult  Goal: Absence of urinary retention  Outcome: Completed     Problem: Metabolic/Fluid and Electrolytes - Adult  Goal: Electrolytes maintained within normal limits  Outcome: Completed     Problem: Safety - Adult  Goal: Free from fall injury  Outcome: Completed   Care plan reviewed with patient. Patient verbalizes understanding of the plan of care and contribute to goal setting.

## 2022-07-12 NOTE — FLOWSHEET NOTE
Prayer and encouragement      07/12/22 1335   Encounter Summary   Encounter Overview/Reason  Initial Encounter   Service Provided For: Patient   Referral/Consult From: Family   Last Encounter  07/12/22   Complexity of Encounter Low   Begin Time 1247   End Time  1251   Total Time Calculated 4 min   Spiritual/Emotional needs   Type Spiritual Support   Assessment/Intervention/Outcome   Assessment Calm

## 2022-07-12 NOTE — CARE COORDINATION
7/12/22, 9:50 AM EDT    DISCHARGE PLANNING EVALUATION      Spoke with [facility], referral completed.

## 2022-07-13 VITALS
OXYGEN SATURATION: 94 % | DIASTOLIC BLOOD PRESSURE: 77 MMHG | TEMPERATURE: 98.4 F | RESPIRATION RATE: 18 BRPM | SYSTOLIC BLOOD PRESSURE: 118 MMHG | HEART RATE: 100 BPM | WEIGHT: 269 LBS | BODY MASS INDEX: 44.82 KG/M2 | HEIGHT: 65 IN

## 2022-07-13 LAB — GLUCOSE BLD-MCNC: 149 MG/DL (ref 70–108)

## 2022-07-13 PROCEDURE — 99024 POSTOP FOLLOW-UP VISIT: CPT | Performed by: SURGERY

## 2022-07-13 PROCEDURE — 6360000002 HC RX W HCPCS: Performed by: SURGERY

## 2022-07-13 PROCEDURE — 6370000000 HC RX 637 (ALT 250 FOR IP): Performed by: SURGERY

## 2022-07-13 PROCEDURE — 82948 REAGENT STRIP/BLOOD GLUCOSE: CPT

## 2022-07-13 PROCEDURE — 6370000000 HC RX 637 (ALT 250 FOR IP): Performed by: NURSE PRACTITIONER

## 2022-07-13 RX ORDER — OXYCODONE HYDROCHLORIDE 5 MG/1
5 TABLET ORAL EVERY 6 HOURS PRN
Qty: 20 TABLET | Refills: 0 | Status: SHIPPED | OUTPATIENT
Start: 2022-07-13 | End: 2022-07-19

## 2022-07-13 RX ORDER — HYOSCYAMINE SULFATE 0.125 MG
125 TABLET,DISINTEGRATING ORAL EVERY 4 HOURS PRN
Qty: 30 TABLET | Refills: 0 | Status: SHIPPED | OUTPATIENT
Start: 2022-07-13 | End: 2022-07-26

## 2022-07-13 RX ADMIN — INSULIN LISPRO 1 UNITS: 100 INJECTION, SOLUTION INTRAVENOUS; SUBCUTANEOUS at 09:09

## 2022-07-13 RX ADMIN — OXYBUTYNIN CHLORIDE 5 MG: 5 TABLET, EXTENDED RELEASE ORAL at 09:09

## 2022-07-13 RX ADMIN — ENOXAPARIN SODIUM 40 MG: 100 INJECTION SUBCUTANEOUS at 09:08

## 2022-07-13 RX ADMIN — PAROXETINE HYDROCHLORIDE 20 MG: 20 TABLET, FILM COATED ORAL at 09:09

## 2022-07-13 RX ADMIN — OXYCODONE 5 MG: 5 TABLET ORAL at 09:09

## 2022-07-13 RX ADMIN — HYOSCYAMINE SULFATE 125 MCG: 0.12 TABLET, ORALLY DISINTEGRATING ORAL at 09:15

## 2022-07-13 ASSESSMENT — PAIN SCALES - GENERAL
PAINLEVEL_OUTOF10: 4
PAINLEVEL_OUTOF10: 4
PAINLEVEL_OUTOF10: 3

## 2022-07-13 ASSESSMENT — PAIN DESCRIPTION - FREQUENCY: FREQUENCY: CONTINUOUS

## 2022-07-13 ASSESSMENT — PAIN DESCRIPTION - PAIN TYPE: TYPE: SURGICAL PAIN

## 2022-07-13 ASSESSMENT — PAIN DESCRIPTION - ORIENTATION: ORIENTATION: RIGHT;LEFT;UPPER;LOWER

## 2022-07-13 ASSESSMENT — PAIN DESCRIPTION - LOCATION: LOCATION: ABDOMEN

## 2022-07-13 ASSESSMENT — PAIN DESCRIPTION - DESCRIPTORS: DESCRIPTORS: ACHING

## 2022-07-13 ASSESSMENT — PAIN DESCRIPTION - ONSET: ONSET: ON-GOING

## 2022-07-13 NOTE — PROGRESS NOTES
[facility] - Dr. Veronika Bright Serve  Postoperative Bariatric Progress Note    Pt Name: Juan Lowe  Medical Record Number: [id number]  Date of Birth 1975   Today's Date: 7/13/2022    ASSESSMENT   1. POD # 2 S/p robotic assisted sleeve gastrectomy  2. Morbid obesity (BMI 45)  3. Diabetes Mellitus    has a past medical history of Allergic rhinitis, Anxiety, Asthma, Depression, Diabetes ([address] Hyperlipidemia, Psychiatric problem, and Sleep apnea. PLAN   1. Upper GI reviewed. No evidence of leak or obstruction. 2. Tolerating sugar free clear liquids. Strict I&Os. 3. IV removed last night due to lack of access. 4. Urinating spontaneously   5. DVT & GI prophylaxis  6. Routine incisional care  7. Pain & nausea control  8. Stool softeners as needed  9. Ambulation every 1-2 hours  10. Resumed home medications  11. Labs reviewed   12. Clinically, looks good. Home today. Follow up in 1 week in weight management. SUBJECTIVE   Patient was stable overnight. Chart reviewed. Updated by nursing staff. Lost IV access last night. VS stable. No fevers. Denies chest discomfort or dyspnea. No N/V; (+) belching, flatus. No BM. Tolerating BARIATRIC DIET; Bariatric Clear Liquid diet. Pain controlled with analgesia. Up ad ursula. Urinating without complaints. Incisions are clean, dry and intact. Patient feels she is ready to go home.   CURRENT MEDICATIONS   Scheduled Meds:   PARoxetine  20 mg Oral QAM    oxybutynin  5 mg Oral Daily    ciprofloxacin  400 mg IntraVENous 90 Min Pre-Op    metroNIDAZOLE  500 mg IntraVENous 60 Min Pre-Op    insulin lispro  0-6 Units SubCUTAneous TID WC    insulin lispro  0-3 Units SubCUTAneous Nightly    sodium chloride flush  5-40 mL IntraVENous 2 times per day    [START ON 7/14/2022] scopolamine  1 patch TransDERmal Q72H    enoxaparin  40 mg SubCUTAneous 2 times per day    ketorolac  15 mg IntraVENous Q6H     Continuous Infusions:   sodium chloride  sodium chloride      sodium chloride 125 mL/hr at 22 1658    dextrose       PRN Meds:.diatrizoate meglumine-sodium, oxyCODONE, hyoscyamine, sodium chloride flush, sodium chloride, morphine **OR** morphine, promethazine, promethazine, metoclopramide, glucose, dextrose bolus **OR** dextrose bolus, glucagon (rDNA), dextrose  OBJECTIVE   CURRENT VITALS:  height is 5' 4.5\" (1.638 m) and weight is 269 lb (122 kg). Her oral temperature is 98.7 °F (37.1 °C). Her blood pressure is 124/72 and her pulse is 94. Her respiration is 19 and oxygen saturation is 93%.    Temperature Range (24h):Temp: 98.7 °F (37.1 °C) Temp  Av.6 °F (37 °C)  Min: 98.2 °F (36.8 °C)  Max: 99.1 °F (37.3 °C)  BP Range (27I): Systolic (58KQU), SSY:607 , Min:111 , GLA:073     Diastolic (56VSS), FBN:98, Min:57, Max:85    Pulse Range (24h): Pulse  Av.8  Min: 90  Max: 97  Respiration Range (24h): Resp  Av.7  Min: 16  Max: 19  Current Pulse Ox (24h):  SpO2: 93 %  Pulse Ox Range (24h):  SpO2  Av.7 %  Min: 91 %  Max: 96 %  Oxygen Amount and Delivery: O2 Flow Rate (L/min): 2 L/min  Incentive Spirometry Tx:            GENERAL: alert, no distress  LUNGS: clear to ausculation, without wheezes, rales or rhonci  HEART: normal rate and regular rhythm  ABDOMEN: obese, non-distended, soft, incisional tenderness, bowel sounds hypo  INCISION: healing well, no significant drainage, no significant erythema  EXTERMITY: no cyanosis, clubbing or edema    In: 635 [P.O.:635]  Out: -   Date 22 0000 - 22 9629   Shift 9054-8526 2934-0233 9014-2153 24 Hour Total   INTAKE   P.O.(mL/kg/hr) 240   240   Shift Total(mL/kg) 240(2)   240(2)   OUTPUT   Shift Total(mL/kg)       Weight (kg) 122 122 122 122     LABS     Recent Labs     22  0642   HGB 11.3*   HCT 37.7      K 3.8      CO2 19*   BUN 9   CREATININE 0.4   CALCIUM 8.1*      RADIOLOGY     PROCEDURE: FL UGI       CLINICAL INFORMATION: s/p sleeve gastrectomy .       TECHNIQUE:  image of the abdomen was done in the upright position. The patient was administered a small amount of Gastrografin. Followed by a small amount of thin barium. Fluoroscopic spot images done. One images done in the supine projection. Fluoroscopy time: 1.3 minutes   Images: 7       COMPARISON: No prior study.           FINDINGS:  image demonstrates moderate free air in the abdomen status post surgery. There was no obstruction to the passage of barium into the stomach remnant. There is no extravasation of contrast. Barium tablet through the small bowel without difficulty.           Impression   Unremarkable postop gastric sleeve no evidence of complication.               **This report has been created using voice recognition software.  It may contain minor errors which are inherent in voice recognition technology. **       Final report electronically signed by Dr. Andrzej Fang on 7/12/2022 9:17 AM     Electronically signed by Liz Pascual MD on 7/13/2022 at 7:37 AM

## 2022-07-14 NOTE — DISCHARGE SUMMARY
Dimas Moroe [address] [facility]       Pt Name: Justin Lombardo  MRN: [id number]  YOB: 1975  Primary Care Physician: KATHERIN Sawyer CNP    Admit date:  2022  5:50 AM      Discharge date:  2022 11:15 AM    Admitting Diagnosis:   1. Morbid obesity (BMI 45)  2. Diabetes Mellitus     Discharge Diagnosis:   1. S/P gastric sleeve procedure    2. Class 3 severe obesity with body mass index (BMI) of 45.0 to 49.9 in adult, unspecified obesity type, unspecified whether serious comorbidity present [facility])        Admitting Service: General Surgery, Waldron Severance MD.    Consultants:  None     History and Physical:  Efe Mendoza (:  1975)      ASSESSMENT:  1.  Morbid obesity (BMI 46)  2.  Sleep apnea  3.  Anxiety  4.  Depression  5.  Diabetes mellitus  6.  Asthma  7.  Multinodular goiter  8.  Restless leg syndrome  9.  Hyperlipidemia     PLAN:  1. Discussion again about the pros and cons of weight loss surgery.  The risks benefits and alternatives to laparoscopic adjustable band, gastric sleeve and gastric Jadiel-en-Y bypass were discussed in detail.  The pros and cons of robotic assisted, laparoscopic and open techniques were discussed. 2.  Behavior modification discussed again in regards to dietary habits. 3.  Nutritional education occurred during visit.  Follow up with dietitian for further evaluation. 4.  Options for medical management of morbid obesity again discussed. 5.  Improvement in fitness/exercise again discussed with patient and the need for this with/without surgery. 6.  Medical necessity letter from PCP. 7.  Follow-up in one month at weight management program at [facility]. 8.  Signs and symptoms reviewed with patient that would be concerning and need her to return to office for re-evaluation.  Patient states she will call if she has questions or concerns.   9. Multivitamin  10.  Psychology evaluation completed.  Following up as needed. 6.  EGD completed.  H. pylori negative.  No significant hiatal hernia. 12.  Encouraged support groups  13.  Send LOMN     Patient states that she has not been able to lose enough adequate excess body weight with medical management only and would like to proceed with a robotic sleeve gastrectomy for further weight loss.     More than 25 minutes spent with patient today. Dario Nietoix than 50% of the time was involved counseling, educaton and coordinating care.     SUBJECTIVE/OBJECTIVE:             Chief Complaint   Patient presents with    Follow-up       month 6 of 6; desires sleeve       HPI  Christiano Mcgrath is a 71-year-old female who presents for follow-up at the weight management program secondary to her morbid obesity.  BMI 55.  Current weight 273 pounds.  Multiple comorbid conditions.  She has not been able to lose enough adequate excess body weight and is wishing to proceed with a robotic sleeve gastrectomy for further weight loss.  Most recent hemoglobin A1c 6.9.  Completed psychology evaluation.  Attended support groups.  Working with the dietitian. Nupur Soriat to work on food selection and portion control.  Completed upper endoscopy.  H. pylori negative.  No significant hiatal hernia.  Following with pulmonary service for sleep apnea.  Next appointment scheduled and made.  Following with cardiology as well. Manfred Sanchez in process.  Echocardiogram beginning of next week.  Taking multivitamin.  Trying to become more active.  Doing chair exercises.  Logging exercises.  Denies chest or abdominal pain.  No hematochezia or melena.  No new urinary complaints.  She states she is excited to proceed with surgical intervention to continue and work towards her weight loss goals.     Review of Systems   Constitutional: Negative for activity change, appetite change, chills, diaphoresis, fatigue, fever and unexpected weight change.    HENT: Negative for congestion, dental problem, drooling, ear discharge, ear pain, facial swelling, hearing loss, mouth sores, nosebleeds, postnasal drip, rhinorrhea, sinus pressure, sneezing, sore throat, tinnitus, trouble swallowing and voice change.    Eyes: Negative for photophobia, pain, discharge, redness, itching and visual disturbance. Respiratory: Negative for apnea, cough, choking, chest tightness, shortness of breath, wheezing and stridor.    Cardiovascular: Negative for chest pain, palpitations and leg swelling. Gastrointestinal: Negative for abdominal distention, abdominal pain, anal bleeding, blood in stool, constipation, diarrhea, nausea, rectal pain and vomiting. Endocrine: Negative.    Genitourinary: Negative for decreased urine volume, difficulty urinating, dyspareunia, dysuria, enuresis, flank pain, frequency, genital sores, hematuria, menstrual problem, pelvic pain, urgency, vaginal bleeding, vaginal discharge and vaginal pain. Musculoskeletal: Negative for arthralgias, back pain, gait problem, joint swelling, myalgias, neck pain and neck stiffness. Skin: Negative for color change, pallor, rash and wound. Allergic/Immunologic: Negative.    Neurological: Negative for dizziness, tremors, seizures, syncope, facial asymmetry, speech difficulty, weakness, light-headedness, numbness and headaches. Hematological: Negative for adenopathy. Does not bruise/bleed easily. Psychiatric/Behavioral: Negative for agitation, behavioral problems, confusion, decreased concentration, dysphoric mood, hallucinations, self-injury, sleep disturbance and suicidal ideas.  The patient is not nervous/anxious and is not hyperactive.          Past Medical History           Past Medical History:   Diagnosis Date    Allergic rhinitis      Anxiety      Asthma      Depression      Diabetes ([facility] [address]      Psychiatric problem       depression    Sleep apnea              Past Surgical History             Past Surgical History: Procedure Laterality Date    CARPAL TUNNEL RELEASE         SECTION        HYSTERECTOMY        KNEE ARTHROSCOPY                Current Facility-Administered Medications               Current Outpatient Medications   Medication Sig Dispense Refill    PARoxetine (PAXIL) 10 MG tablet Take 20 mg by mouth every morning        calcium carbonate (OSCAL) 500 MG TABS tablet Take 500 mg by mouth daily        melatonin 3 MG TABS tablet Take 1 tablet by mouth nightly as needed (Insomnia) 30 tablet 11    Multiple Vitamins-Minerals (THERAPEUTIC MULTIVITAMIN-MINERALS) tablet Take 1 tablet by mouth daily        CPAP Machine MISC by Does not apply route Please check patient's CPAP machine for proper functioning by [employer]. Danisha Salas was prescribed with a CPAP with a pressure of 11 cm of water with room air. 1 each 0    traZODone (DESYREL) 150 MG tablet Take 150 mg by mouth nightly        Dulaglutide (TRULICITY) 8.80 MW/8.9HW SOPN Inject 0.75 mg into the skin once a week Taking 1.5        omeprazole (PRILOSEC) 40 MG delayed release capsule Take 1 capsule by mouth daily        oxybutynin (DITROPAN-XL) 5 MG extended release tablet Take 1 tablet by mouth daily         atorvastatin (LIPITOR) 40 MG tablet TAKE ONE TABLET BY MOUTH DAILY 30 tablet 3    metFORMIN (GLUCOPHAGE) 1000 MG tablet TAKE ONE TABLET BY MOUTH TWICE A DAY WITH MEALS (Patient taking differently: See Admin Instructions 500 mg in AM, 1000mg in PM.) 60 tablet 3    Blood Glucose Monitoring Suppl (ACCU-CHEK ROD PLUS) w/Device KIT Check blood sugar once daily 1 kit 0    glucose blood VI test strips (ACCU-CHEK ROD PLUS) strip Check blood sugar once daily 100 each 3    Lancets MISC Use to test blood glucose level 1 time per day.  Diagnosis: E11.9 100 each 1    Cetirizine HCl (ZYRTEC ALLERGY) 10 MG CAPS Take 1 tablet by mouth daily        montelukast (SINGULAIR) 10 MG tablet Take 10 mg by mouth nightly        rOPINIRole (REQUIP) 0.25 MG tablet Take 4 mg by mouth nightly         albuterol sulfate  (90 BASE) MCG/ACT inhaler Inhale 2 puffs into the lungs every 6 hours as needed for Wheezing          No current facility-administered medications for this visit.                    Allergies   Allergen Reactions    Latex Rash    Keflex [Cephalexin] Rash    Betadine [Povidone Iodine]      Neosporin [Neomycin-Polymyxin-Gramicidin]      Pcn [Penicillins]      Rocephin [Ceftriaxone] Rash         Family History             Family History   Problem Relation Age of Onset    Thyroid Disease Mother      Depression Mother      Asthma Mother      Arthritis Mother      Cancer Father      Arthritis Father      Diabetes Sister      Arthritis Sister      Cancer Maternal Uncle      Arthritis Paternal Grandfather      Cancer Paternal Grandfather      Arthritis Paternal Grandmother      Arthritis Maternal Grandfather      Arthritis Maternal Grandmother      Asthma Maternal Grandmother              Social History                   Socioeconomic History    Marital status:        Spouse name: Not on file    Number of children: 3    Years of education: Not on file    Highest education level: Not on file   Occupational History    Not on file   Tobacco Use    Smoking status: Never Smoker    Smokeless tobacco: Never Used   Vaping Use    Vaping Use: Never used   Substance and Sexual Activity    Alcohol use: Yes       Comment: socially    Drug use: No    Sexual activity: Yes   Other Topics Concern    Not on file   Social History Narrative    Not on file      Social Determinants of Health            Financial Resource Strain: Low Risk     Difficulty of Paying Living Expenses: Not very hard   Food Insecurity: Food Insecurity Present    Worried About Running Out of Food in the Last Year: Sometimes true    Antwan of Food in the Last Year: Sometimes true   Transportation Needs: No Transportation Needs    Lack of Transportation (Medical):  No    Lack of Transportation (Non-Medical): No   Physical Activity: Sufficiently Active    Days of Exercise per Week: 5 days    Minutes of Exercise per Session: 30 min   Stress: Stress Concern Present    Feeling of Stress : Rather much   Social Connections: Moderately Integrated    Frequency of Communication with Friends and Family: More than three times a week    Frequency of Social Gatherings with Friends and Family: Three times a week    Attends Alevism Services: More than 4 times per year    Active Member of Clubs or Organizations: No    Attends Club or Organization Meetings: Patient refused    Marital Status:    Intimate Partner Violence: Not At Risk    Fear of Current or Ex-Partner: No    Emotionally Abused: No    Physically Abused: No    Sexually Abused: No   Housing Stability: Low Risk     Unable to Pay for Housing in the Last Year: No    Number of [place] in the Last Year: 1    Unstable Housing in the Last Year: No         Vitals         Vitals:     04/29/22 1015   BP: 122/72   Site: Right Upper Arm   Position: Sitting   Cuff Size: Large Adult   Pulse: 88   Resp: 18   Temp: 97.2 °F (36.2 °C)   TempSrc: Infrared   Weight: 273 lb 3.2 oz (123.9 kg)   Height: 5' 4\" (1.626 m)         Body mass index is 46.89 kg/m².           Wt Readings from Last 3 Encounters:   04/29/22 273 lb 3.2 oz (123.9 kg)   03/24/22 231 lb (104.8 kg)   03/21/22 266 lb 6.4 oz (120.8 kg)      Physical Exam  Vitals reviewed. Constitutional:       General: She is not in acute distress.     Appearance: She is well-developed. She is not diaphoretic. HENT:      Head: Normocephalic and atraumatic.      Right Ear: External ear normal.      Left Ear: External ear normal.      Nose: Nose normal.   Eyes:      General: No scleral icterus.        Right eye: No discharge.         Left eye: No discharge.      Conjunctiva/sclera: Conjunctivae normal.   Cardiovascular:      Rate and Rhythm: Normal rate and regular rhythm.    Heart sounds: Normal heart sounds. Pulmonary:      Effort: Pulmonary effort is normal. No respiratory distress.      Breath sounds: Normal breath sounds. No wheezing or rales. Chest:      Chest wall: No tenderness. Abdominal:      General: Bowel sounds are normal. There is no distension.      Palpations: Abdomen is soft. There is no mass.      Tenderness: There is no abdominal tenderness. There is no guarding or rebound. Musculoskeletal:         General: No tenderness. Normal range of motion.      Cervical back: Normal range of motion and neck supple. Skin:     General: Skin is warm and dry.      Coloration: Skin is not pale.      Findings: No erythema or rash. Neurological:      Mental Status: She is alert and oriented to person, place, and time.      Cranial Nerves: No cranial nerve deficit. Psychiatric:         BehaviorFrjose Avery     Thought Content:  Thought content normal.         Judgment: Judgment normal.                   Lab Results   Component Value Date     WBC 6.7 10/04/2021     HGB 12.6 10/04/2021     HCT 39.2 10/04/2021     MCV 91.6 10/04/2021      10/04/2021                Lab Results   Component Value Date      10/04/2021     K 4.1 10/04/2021      10/04/2021     CO2 23 10/04/2021                Lab Results   Component Value Date     CREATININE 0.6 10/04/2021                Lab Results   Component Value Date     ALT 14 10/04/2021     AST 15 10/04/2021     ALKPHOS 68 10/04/2021     BILITOT 0.5 10/04/2021                Lab Results   Component Value Date     LIPASE 17.8 11/29/2020               Patient Active Problem List   Diagnosis    Dyslipidemia    Cellulitis of lower extremity  bilateral    Allergic drug reaction    Myalgia  lower wxtremities    Staphylococcal skin disorder    Type 2 diabetes mellitus without complication, without long-term current use of insulin (HCC)    RLS (restless legs syndrome)    Multinodular goiter                               An electronic signature was used to authenticate this note.     --Juan Mercer MD      ADDENDUM:   1. Schedule Milvia Eugene for robotic sleeve gastrectomy. 2. She will undergo pre-operative clearance per anesthesia guidelines with risk factors listed under the past medical history diagnosis & problem list.  3. The risks, benefits and alternatives were discussed with Milvia Eugene including non-operative management. The pros and cons of robotic, laparoscopic and open techniques were discussed. All questions answered. She understands and wishes to proceed with surgical intervention. 4. Restrictions discussed with Milvia Eugene and she expresses understanding. 5. She is advised to call back directly if there are further questions/concerns, or if her symptoms worsen prior to surgery.     Electronically signed by Juan Mercer MD on 7/8/22 at 7:25 AM EDT     Procedures/Diagnostic Tests:    Pt Name: Valentin Palafox  MRN: [id number]  YOB: 1975  Surgeon: Pj Loya MD FACS  Primary Care Physician: KATHERIN Tidwell [facility]  Procedure Date: 7/11/2022                PREOPERATIVE DIAGNOSES:  1.  Morbid obesity  2.  BMI 45  3. DM     POSTOPERATIVE DIAGNOSES: Same      PROCEDURE:  Robotic sleeve gastrectomy.     SURGEON: Pj Loya M.D. FACS     ASSISTANT:  [facility]     ANESTHESIA:  General/local.     ESTIMATED BLOOD LOSS:  10 ml     DRAINS:  None     COMPLICATIONS:  None     DISPOSITION:  Stable to the recovery room     SPECIMEN:  Stomach.     INDICATIONS FOR PROCEDURE:  The patient is a 51 y.o.-old female who had  been seen in the weight management program secondary to her morbid obesity. She was not able to lose enough adequate excess body weight with medical  management only, and wished to proceed with a robotic sleeve gastrectomy.    Risks of surgery were then further discussed.  Some of the risks included  but were not limited to bleeding, infection, the need for reoperation,  severe chronic postoperative pain or numbness, major vascular or nerve  injury, cardiopulmonary complications, anesthetic complications,  seroma/hematoma formation, wound breakdown, trocar site herniation, staple  line breakdown/leak, staple line bleed, sleeve stricture, chronic nausea, chronic  pain, and death.  After all of the questions were answered in their  entirety and the patient was completely aware of the current situation, she  elected to proceed with the procedure.     DESCRIPTION OF THE PROCEDURE:  After informed consent was signed and placed  on the chart, the patient was taken back to the operating room and placed  supine on the operating room table.  General anesthesia was induced. She  tolerated this well throughout the case.  All pressure points were padded. She was on preoperative antibiotics.  Bilateral lower extremity sequential  compression devices were placed prior to incision. Her abdomen and pelvis  were prepped and draped in the usual sterile standard fashion.  A time-out  occurred prior to the operation, which not only identified the patient but  also the planned procedure to be performed.  At the end of the time-out,  there were no questions or concerns.     I began the operation first by making a small transverse incision just  above and to the right of the umbilicus.  Using low-flow insufflation and  the OptiView, the abdomen was entered into without difficulty.  The  intra-abdominal cavity was insufflated to a pressure of approximately 15  mmHg with carbon dioxide gas.  The patient tolerated the insufflation well.   Three separate 8-mm trocars were placed in their standard location under  direct vision.  A 5-mm trocar was placed in the far right lateral abdominal  region under direct vision.  Liver retractor was brought in and placed  without difficult under direct vision.  The 11 mm was then replaced by  12-mm robotic trocar for the stapler and specimen extraction.  The patient was then  placed in reverse Trendelenburg.  Robot brought in and docked.  Instruments  were placed under direct vision.  Once everything was aligned and in order,  I then unscrubbed and went back to the console.  I began the operation by  first evaluating the hiatus.  There appeared to be no significant hiatal  hernia.  The lesser sac was entered into using the vessel sealer going  through the mesentery there on the greater curvature of the stomach. Mesentery here was then unzipped distally to about 5 to 6 cm proximal from the  pylorus.  It was then unzipped proximal up and around the fundus to the  left lucio.  Short gastric vessels were taken down.  Spleen preserved. Hemostasis adequate.  Here, it was confirmed there was no significant  hiatal hernia.  After it was assured that the fundus and the upper stomach  was completely mobilized, the bougie 40-Vietnamese was then placed under direct  vision and positioned with the help of anesthesia.  This was then placed to suction.  First firing was  a 61 robotic black reinforced SEAMGUARD cartridge.  Care was taken to hug the bougie  but also not to stricture down the angle of the stomach.     There were several firings of the 60-mm robotic green reinforced SEAMGUARD  cartridges.  All of these hugged the bougie.  Last firing was a 60 mm robotic green reinforced SEAMGUARD  cartridge.  Here, care was taken to ensure taking all of  the excess fundus, but also not to stricture down or encroach on the GE  junction.  Staple line was then tested under irrigation with air  insufflation through the bougie and clamped distally.  No air bubbles or  any kind of concern for leak was identified.  Irrigation was then removed. Bougie taken off of suction and removed under direct visualization.   0 Ethibond suture was then placed through the  gastric remnant and brought up to the 12-mm trocar site.  Instruments were  removed.  Robot undocked.  I then scrubbed back into the case.  Gastric  remnant was removed and sent to Pathology for permanent.  Using a Storz  suture passer and 0 Vicryl suture, this was placed through the fascia there  around the 12-mm trocar site.  This was tied and at the completion of this,  there were no fascial defects.  Liver retractor removed.  Other trocars  were then removed under direct vision.  Hemostasis was adequate.  The  patient tolerated desufflation well.  Vicryl suture 4-0 was used in a  subcuticular fashion in all the open incisions for skin closure.  Closed  incisions were then cleaned, dried, and Steri-Strips applied.  Marcaine  0.5% with epinephrine was used for local anesthetic.  The patient tolerated  the injection of local anesthetic without difficulty.  Sponge, needle and  instrumentation counts were correct at the end of the procedure.  The  patient tolerated the procedure well with no apparent complications and  only about 10 mL of blood loss. She was later brought out of  general anesthesia and then transferred to postanesthesia care unit in  stable condition.     Meño Lora M.D. FACS  Electronically signed 7/11/2022 at 10:37 AM     PROCEDURE: FL UGI       CLINICAL INFORMATION: s/p sleeve gastrectomy .       TECHNIQUE:  image of the abdomen was done in the upright position. The patient was administered a small amount of Gastrografin. Followed by a small amount of thin barium. Fluoroscopic spot images done. One images done in the supine projection. Fluoroscopy time: 1.3 minutes   Images: 7       COMPARISON: No prior study.           FINDINGS:  image demonstrates moderate free air in the abdomen status post surgery. There was no obstruction to the passage of barium into the stomach remnant.  There is no extravasation of contrast. Barium tablet through the small bowel without difficulty.           Impression   Unremarkable postop gastric sleeve no evidence of complication.               **This report has been created using voice recognition software.  It may contain minor errors which are inherent in voice recognition technology. **       Final report electronically signed by Dr. Sukhwinder Londono on 7/12/2022 9:17 AM           Hospital Course: Luly Cardona is a 79-year-old female patient who presented to the weight management program for her (morbid) obesity, BMI 45. She decided to electively undergo robotic assisted sleeve gastrectomy with Dr. Tiana Keita on 7/11/22. She was admitted to [facility] for postoperative care and was initially managed with bowel rest, analgesics for pain control, IV fluid hydration, GI and DVT prophylaxis. On 7/12/22 she passed her upper GI study and patient was advanced to sugar free clear liquids. She slowly improved in ability to tolerate increasing levels of activity and able to take clear liquids adequately. Patient was able to void on her own accord. Luly Cardona was discharged home on liquid diet until follow up appointment. Patient discharged with home health so nurses can follow the patient for medication compliance, wound healing and nutrition needs. Discharge Condition: stable    Disposition: home    Labs:  Lab Results   Component Value Date    WBC 8.7 05/02/2022    HGB 11.3 (L) 07/12/2022    HCT 37.7 07/12/2022    MCV 88.8 05/02/2022     05/02/2022     Lab Results   Component Value Date/Time     07/12/2022 06:42 AM    K 3.8 07/12/2022 06:42 AM     07/12/2022 06:42 AM    CO2 19 07/12/2022 06:42 AM    BUN 9 07/12/2022 06:42 AM    CREATININE 0.4 07/12/2022 06:42 AM    GLUCOSE 184 07/12/2022 06:42 AM    CALCIUM 8.1 07/12/2022 06:42 AM        Wt Readings from Last 3 Encounters:   07/11/22 269 lb (122 kg)   06/27/22 272 lb 6.4 oz (123.6 kg)   05/23/22 276 lb (125.2 kg)       Discharge Instructions:  Pt Name: Anai Corcoran  Medical Record Number: [id number]  Today's Date: 7/12/2022    GENERAL ANESTHESIA OR SEDATION  1.  Do not drive or operate hazardous machinery for 24 hours. 2. Do not make important business or personal decisions for 24 hours. 3. Do not drink alcoholic beverages or use tobacco for 24 hours. ACTIVITY INSTRUCTIONS:  [] Rest today. Resume light to normal activity tomorrow.   [] You may resume normal activity tomorrow. Do not engage in strenuous activity that may place stress on your incision. [x] Do not drive for 3-5 days or while taking pain medication. Avoid heavy lifting, tugging, pullings greater than 10 lbs until seen in the office. DIET INSTRUCTIONS:  [x]Other: Sugar-free post bariatric surgery diet as instructed per dietician. Continue protein shakes as prior to surgery. Two shakes a day with milk or three shakes a day with water. MEDICATIONS  [x]Prescription sent with you to be used as directed. []Percocet   []Vicodin   [x]oxycodone    []Tylenol #3   []Oxycontin  Do not drink alcohol or drive while taking these medications. You may experience dizziness or drowsiness with these medications. You may also experience constipation which can be relieved with stool softners or laxatives. [x]You may resume your daily prescription medication schedule unless otherwise specified. [x]Do not take 325mg Aspirin or other blood thinners such as Coumadin or Plavix for 5 days. Do not resume your diabetic medications unless your blood glucose level is consistently greater than 200 mg/dL. Please make an appointment to see the physician who manages your diabetes. Take the Zofran exactly as prescribed to control nausea and vomiting. Take the Reglan exactly as prescribed to control nausea and vomiting. Take the Lovenox exactly as prescribed. Use Colace and MiraLAX as prescribed to prevent constipation. Do not allow yourself to become constipated. Drink at least 64 ounces of liquids per day. If constipated despite adequate fluid intake and no results with Colace and MiraLAX, may use magnesium citrate 1 bottle as needed.   Poor over ice and sip at slowly over several hours. WOUND/DRESSING INSTRUCTIONS:  Always ensure you and your care giver clean hands before and after caring for the wound. [] Keep dressing clean and dry for 48 hours. Change when soiled or wet. [x] Allow steri-strips to fall off on their own. [x] Ice operative site for 20 minutes 4 times a day as needed. [x] May wash over incision in shower, but do not soak in a bath.  [] Take sitz bath for 20 minutes twice daily and after bowel movements. [x] Keep the abdominal binder in place during the day. May remove to shower and at night. ABDOMINAL/LAPAROSCOPIC SURGERY  [x]You are encouraged to get up and move around as this helps with circulation, prevents blood clots from forming and speeds up the healing process. Call the office if you develop pain or swelling in your legs. Do not massage sore muscles in the legs. [x]Breath deeply and cough from time to time. This helps to clear your lungs and helps prevent pneumonia. [x]Supporting your incision with a pillow or your hand helps to minimize discomfort and pain. [x]Laparoscopic patients may develop shoulder pain in the first 48 hours from the gas used during the procedure a heating pad may help alleviate this discomfort. FOLLOW-UP CARE. SPECIFICALLY WATCH FOR:   Fever over 101 degrees by mouth   Increased redness, warmth, hardness at operative site. Blood soaked dressing (small amounts of oozing may be normal.)   Increased or progressive drainage from the surgical area   Inability to urinate or blood in the urine   Pain not relieved by the medications ordered   Persistent nausea and/or vomiting, unable to retain fluids. Pain or swelling in your legs. Shortness of breath. Call the office if you develop any of the above symptoms. FOLLOW-UP APPOINTMENT   [x]1 week: in weight management   []2 weeks:    []Other    Call my office if you have any problem that concerns you [phone].  After hours, you can reach the answering service via the office phone number. IF YOU NEED IMMEDIATE ATTENTION, GO TO THE EMERGENCY ROOM AND YOUR DOCTOR WILL BE CONTACTED. Prepared by Pablo Hendrix CNP for  Kristy Sy MD  [address]  [place], [address]  Electronically signed 7/12/2022 at 10:55 AM    Discharge Medications:        Medication List      START taking these medications    hyoscyamine 125 MCG Tbdp dispersible tablet  Commonly known as: OSCIMIN  Take 1 tablet by mouth every 4 hours as needed (epigastric cramping/pain)     oxyCODONE 5 MG immediate release tablet  Commonly known as: ROXICODONE  Take 1 tablet by mouth every 6 hours as needed for Pain for up to 20 doses. CONTINUE taking these medications    Accu-Chek Jayashree Plus w/Device Kit  Check blood sugar once daily     albuterol sulfate  (90 Base) MCG/ACT inhaler  Commonly known as: PROVENTIL;VENTOLIN;PROAIR     atorvastatin 40 MG tablet  Commonly known as: LIPITOR  TAKE ONE TABLET BY MOUTH DAILY     blood glucose test strips strip  Commonly known as: Accu-Chek Jayashree Plus  Check blood sugar once daily     calcium carbonate 500 MG Tabs tablet  Commonly known as: OSCAL     CPAP Machine Misc  by Does not apply route Please check patient's CPAP machine for proper functioning by [employer]. She was prescribed with a CPAP with a pressure of 11 cm of water with room air. Docusate Sodium 100 MG Tabs  Take 100 mg by mouth 2 times daily Take as needed to prevent constipation     enoxaparin 40 MG/0.4ML  Commonly known as: Lovenox  Inject 0.4 mLs into the skin daily     Lancets Misc  Use to test blood glucose level 1 time per day.  Diagnosis: E11.9     melatonin 3 MG Tabs tablet  Take 1 tablet by mouth nightly as needed (Insomnia)     metoclopramide 10 MG tablet  Commonly known as: REGLAN  Take 1 tablet by mouth every 6 hours as needed (nausea/vomiting)     montelukast 10 MG tablet  Commonly known as: SINGULAIR     omeprazole 40 MG delayed release capsule  Commonly known as: PRILOSEC     oxybutynin 5 MG extended release tablet  Commonly known as: DITROPAN-XL     PARoxetine 10 MG tablet  Commonly known as: PAXIL     rOPINIRole 0.25 MG tablet  Commonly known as: REQUIP     therapeutic multivitamin-minerals tablet     traZODone 150 MG tablet  Commonly known as: DESYREL     ZyrTEC Allergy 10 MG Caps  Generic drug: Cetirizine HCl        STOP taking these medications    metFORMIN 1000 MG tablet  Commonly known as: GLUCOPHAGE     Trulicity 3.96 LQ/2.3CE Sopn  Generic drug: Dulaglutide           Where to Get Your Medications      These medications were sent to [facility] [id number] - [address] [place] [address], [address] [place] [id number]    Phone: [phone]   · hyoscyamine 125 MCG Tbdp dispersible tablet  · oxyCODONE 5 MG immediate release tablet         Follow-up:  in 7-10 days with KATHERIN Salgado CNP  Follow up: in 1 week in weight management with Dr. Erika Roberts MD/Jennifer Antonio, [address], APRN - CNP, CNP   Electronincally signed 7/14/2022 at 11:32 AM    A total of 35 minutes was spent in preparing the patient for discharge with greater than 50% of the time involved with education, counseling and coordinating care

## 2022-07-19 ENCOUNTER — OFFICE VISIT (OUTPATIENT)
Dept: BARIATRICS/WEIGHT MGMT | Age: 47
End: 2022-07-19

## 2022-07-19 VITALS
WEIGHT: 263.2 LBS | HEIGHT: 66 IN | BODY MASS INDEX: 42.3 KG/M2 | HEART RATE: 76 BPM | SYSTOLIC BLOOD PRESSURE: 114 MMHG | DIASTOLIC BLOOD PRESSURE: 74 MMHG | TEMPERATURE: 98 F

## 2022-07-19 DIAGNOSIS — Z99.89 OSA ON CPAP: ICD-10-CM

## 2022-07-19 DIAGNOSIS — G47.33 OSA ON CPAP: ICD-10-CM

## 2022-07-19 DIAGNOSIS — E11.9 TYPE 2 DIABETES MELLITUS WITHOUT COMPLICATION, WITHOUT LONG-TERM CURRENT USE OF INSULIN (HCC): ICD-10-CM

## 2022-07-19 DIAGNOSIS — F32.A DEPRESSION, UNSPECIFIED DEPRESSION TYPE: ICD-10-CM

## 2022-07-19 DIAGNOSIS — F41.9 ANXIETY: ICD-10-CM

## 2022-07-19 DIAGNOSIS — Z90.3 S/P GASTRIC SLEEVE PROCEDURE: Primary | ICD-10-CM

## 2022-07-19 PROCEDURE — 99024 POSTOP FOLLOW-UP VISIT: CPT | Performed by: PHYSICIAN ASSISTANT

## 2022-07-19 NOTE — PROGRESS NOTES
[facility]  [address], [address]  [facility], [place]      Visit Date:  2022  Weight Management Postop Follow-up    HPI:      Juan Lowe is a 49 y.o. female who is here today for 1 weeks follow up since  laparoscopic sleeve gastrectomy performed by Dr. Kaila Matias  on 22. Pt reports that she is doing well post-op. Weight today 263#. Down 6# since surgery. BMI 42. Pt reports that she is drinking 90 oz of fluid and 70 grams of protein daily. Drinking 2 protein shakes and bone broth daily ( 10 grams of protein). Tolerating post-op diet. Bowel movements have been regular. Had diarrhea once today-advised to hold stool softener. No nausea or emesis. Denies heartburn/ GERD - continues to take PPI. Denies sx of dehydration. No fever or chills. Denies CP/SOB. No Abdominal pain. Minimal incisional discomfort- wearing abdominal binder. Tolerating all vitamins- Calcium 3xd/ [id number]/ B12. Off Oxycodone for the last 3 days. Currently taking Tylenol 500mg 2 daily. Taking Lovenox, as rx. Checking sugars and running 140-170. Currently holding Metformin/ Trulicity. Saw PCP yesterday and monitoring labs and may restart diabetic medications. Wearing CPAP Qhs. Physical Activity:  walking/ on feet at least every 2 hours throughout the day       Current BMI: Body mass index is 42.48 kg/m².   Current Weight:   Wt Readings from Last 3 Encounters:   22 263 lb 3.2 oz (119.4 kg)   22 269 lb (122 kg)   22 272 lb 6.4 oz (123.6 kg)     Initial weight: 281  Pre-op Body [id number]      Past Medical History:  Past Medical History:   Diagnosis Date    Allergic rhinitis     Anxiety     Asthma     Depression     Diabetes ([address]     Hyperlipidemia     Psychiatric problem     depression    Sleep apnea        Past Surgical History:  Past Surgical History:   Procedure Laterality Date    CARPAL TUNNEL RELEASE       SECTION      HYSTERECTOMY (CERVIX STATUS UNKNOWN)      KNEE ARTHROSCOPY      SLEEVE GASTRECTOMY N/A 7/11/2022    Robotic Sleeve Gastrectomy performed by Liz Pascual MD at [facility]       Past Social History:  Social History     Socioeconomic History    Marital status:      Spouse name: Not on file    Number of children: 3    Years of education: Not on file    Highest education level: Not on file   Occupational History    Not on file   Tobacco Use    Smoking status: Never    Smokeless tobacco: Never   Vaping Use    Vaping Use: Never used   Substance and Sexual Activity    Alcohol use: Yes     Comment: socially    Drug use: No    Sexual activity: Yes   Other Topics Concern    Not on file   Social History Narrative    Not on file     Social Determinants of Health     Financial Resource Strain: Low Risk     Difficulty of Paying Living Expenses: Not very hard   Food Insecurity: Food Insecurity Present    Worried About Running Out of Food in the Last Year: Sometimes true    Ran Out of Food in the Last Year: Sometimes true   Transportation Needs: No Transportation Needs    Lack of Transportation (Medical): No    Lack of Transportation (Non-Medical): No   Physical Activity: Sufficiently Active    Days of Exercise per Week: 5 days    Minutes of Exercise per Session: 30 min   Stress: Stress Concern Present    Feeling of Stress : Rather much   Social Connections: Moderately Integrated    Frequency of Communication with Friends and Family: More than three times a week    Frequency of Social Gatherings with Friends and Family:  Three times a week    Attends Sikhism Services: More than 4 times per year    Active Member of Clubs or Organizations: No    Attends Club or Organization Meetings: Patient refused    Marital Status:    Intimate Partner Violence: Not At Risk    Fear of Current or Ex-Partner: No    Emotionally Abused: No    Physically Abused: No    Sexually Abused: No   Housing Stability: Low Risk     Unable to Pay for Housing in the Last Year: No    Number of Places Lived in the Last Year: 1    Unstable Housing in the Last Year: No        Medications:   Current Outpatient Medications   Medication Sig Dispense Refill    hyoscyamine (OSCIMIN) 125 MCG TBDP dispersible tablet Take 1 tablet by mouth every 4 hours as needed (epigastric cramping/pain) 30 tablet 0    Docusate Sodium 100 MG TABS Take 100 mg by mouth 2 times daily Take as needed to prevent constipation 30 tablet 1    enoxaparin (LOVENOX) 40 MG/0.4ML Inject 0.4 mLs into the skin daily 14 each 0    PARoxetine (PAXIL) 10 MG tablet Take 20 mg by mouth every morning      Multiple Vitamins-Minerals (THERAPEUTIC MULTIVITAMIN-MINERALS) tablet Take 1 tablet by mouth daily      CPAP Machine MISC by Does not apply route Please check patient's CPAP machine for proper functioning by [employer]. She was prescribed with a CPAP with a pressure of 11 cm of water with room air. 1 each 0    traZODone (DESYREL) 150 MG tablet Take 150 mg by mouth nightly      omeprazole (PRILOSEC) 40 MG delayed release capsule Take 1 capsule by mouth daily      oxybutynin (DITROPAN-XL) 5 MG extended release tablet Take 1 tablet by mouth daily       atorvastatin (LIPITOR) 40 MG tablet TAKE ONE TABLET BY MOUTH DAILY 30 tablet 3    Blood Glucose Monitoring Suppl (ACCU-CHEK ROD PLUS) w/Device KIT Check blood sugar once daily 1 kit 0    glucose blood VI test strips (ACCU-CHEK ROD PLUS) strip Check blood sugar once daily 100 each 3    Lancets MISC Use to test blood glucose level 1 time per day.  Diagnosis: E11.9 100 each 1    Cetirizine HCl 10 MG CAPS Take 1 tablet by mouth daily      montelukast (SINGULAIR) 10 MG tablet Take 10 mg by mouth nightly      rOPINIRole (REQUIP) 0.25 MG tablet Take 4 mg by mouth nightly       albuterol sulfate  (90 BASE) MCG/ACT inhaler Inhale 2 puffs into the lungs every 6 hours as needed for Wheezing      calcium carbonate (OSCAL) 500 MG TABS tablet Take 500 mg by mouth daily (Patient not taking: Reported on 7/19/2022)       No current facility-administered medications for this visit. Allergies: Allergies   Allergen Reactions    Latex Rash    Keflex [Cephalexin] Rash    Zofran [Ondansetron] Hallucinations    Betadine [Povidone Iodine] Itching and Swelling     Iv site - main area from parekh catheter    Bacitracin Rash    Neosporin [Neomycin-Polymyxin-Gramicidin] Rash    Pcn [Penicillins] Hives, Swelling and Rash    Rocephin [Ceftriaxone] Rash       Subjective:    Review of Systems:  Constitutional: Denies any fever, chills, fatigue. Wound: Denies any rash, skin color changes or wound problems. Resp: Denies any cough, shortness of breath. CV: Denies any chest pain, orthopnea or syncope. MS: Denies myalgias, arthralgias. GI: Denies any nausea, vomiting, diarrhea, constipation, melena, hematochezia. (+) minimal incisional discomfort. : Denies any hematuria, hesitancy or dysuria. NEURO: Denies seizures, headache. Objective:    /74 (Site: Right Upper Arm, Position: Sitting, Cuff Size: Large Adult)   Pulse 76   Temp 98 °F (36.7 °C) (Oral)   Ht 5' 6\" (1.676 m)   Wt 263 lb 3.2 oz (119.4 kg)   BMI 42.48 kg/m²     Physical Examination:   Constitutional: Alert and oriented to person, place and time. Well-developed, well- nourished. Head: Normocephalic and atraumatic  Neck: Supple. Eyes: EOMI b/l. Conjunctivae normal.  No scleral icterus. Respiratory: Effort normal. No respiratory distress. Abd:  Steri-strips removed. Incisions are well healed. Minimal -tenderness. No sign of infection. Ext:  Movement x 4. No edema  Skin; Warm and dry, no visible rashes, lesions or ulcers.    Neuro: Cranial Nerves Grossly Intact; nml coordination    Labs:  CBC   Lab Results   Component Value Date/Time    WBC 8.7 05/02/2022 09:00 PM    RBC 4.19 05/02/2022 09:00 PM    HGB 11.3 07/12/2022 06:42 AM    HCT 37.7 07/12/2022 06:42 AM    MCV 88.8 05/02/2022 09:00 PM    MCH 28.4 05/02/2022 09:00 PM exercise.     Electronically signed by DANIELITO Leong on 7/19/2022 at 10:14 AM

## 2022-07-19 NOTE — PATIENT INSTRUCTIONS
Stay well hydrated. Drink a minimum of 64 oz of non-carbonated, non-caffeinated fluids daily. Nutritional education occurred during visit. Tolerating diet. Continue following  with dietitian and follow their recommendations as  directed. Continue  60-80  grams of protein each day. Signs and symptoms reviewed with patient that would be concerning and need her to return to office for re-evaluation. Patient will call if any questions or  concerns arrise. No lifting, pushing, pulling over 20#  No abdominal exercises  Wear abdominal binder prn  Complete Lovenox as rx  Importance of physical activity discussed with patient. Advised to increase activity as tolerated. Continue taking Multivitamin, Calcium and B12 as directed  Continue Omeprazole for at least 3 months post-op.   Encouraged to attend support groups  May start soft foods over weekend AFTER drinking 2 protein shakes- yogurt, cottage cheese, mashed potatoes, applesauce  Follow-up in 1 week with provider and dietitian  Wear CPAP   Continue to monitor blood sugars - adjust medication with family doctor as needed

## 2022-07-25 NOTE — PROGRESS NOTES
Patient is a 49 y.o. female seen for follow up MNT visit for two week post op. Vitals from current and previous visits:  Vitals 4/01/9979   SYSTOLIC 92   DIASTOLIC 64   Site Right Upper Arm   Position Sitting   Cuff Size Large Adult   Pulse 72   Temp 98.1   Resp    SpO2    Weight 255 lb 3.2 oz   Height 5' 6\"   Body mass index 41.19 kg/m2   Pain Level         Recent Post Op Lab Work:    Lab Results   Component Value Date/Time    VITD25 32 05/25/2022 02:51 PM       Date of Surgery: 7/11/22  Type of Surgery: gastric sleeve      Initial Weight: 273 lbs at pre-op teaching class   Excess  Body Weight Pre-Op: 127  lbs    Weight Loss: 18 lbs. Patient's Target Weight =  146 lbs for a BMI of ~25  Percentage of Excess Body Weight Lost to date is :  14.1 %    How pleased are you with your current weight loss: Pt is pleased with weight loss    Are you experiencing any physical problems post surgery: No Heartburn or reflux. States bowels are moving ok    Protein intake: 60-80 grams/day   Patient taking protein supplement: Yes. Brand of Supplement: Haily Gong Whey Protein Cookies and Cream at times and Equ[employer] Brand ready made 30 grams protein shake two per day    Fluid intake: Recording daily fluid intake at ~ 80 oz /day     Multivitamin/mineral intake: Celebrate One 45 chewable tropical twist    Calcium intake: Yes. 500 mg calcium soft chew three per day- told pt to decrease to two per day as receiving calcium from protein shakes    Other: B12 daily      Assessment  Pt tolerating clear and full liquids without difficulty. Supplementing with protein shakes and taking bariatric vitamins as recommended      Plan  Plan/Recommendations: Educated pt on two week post op nutrition guidelines. Sample menus, recipes, and restaurant card given to patient. See Additional Instructions below. -  Patient Instructions   1. Begin to set aside three meal times per day about 1/4 cup portion size.     Important to have set meal times so can still focus on adequate fluid intake between meals and get your protein in.  2.  Now is when you want to separate your liquids from solids. No liquids 30 minutes before your meals and no liquids 30 minutes after your meals. Water goal at least 64 oz per day  3. Continue to drink protein shakes between meals as can not get enough protein from food alone at this time. Goal is 60-80 grams protein per day. 4.  Continue taking bariatric vitamins as currently taking. Get  your six week post op lab work completed 5-7 days prior to next office visit.     Recommended Follow-Up:  Six week post op with PA and JIMENEZ Berry RD, LD,   Dietitian- Weight Management [address] [place] [address]

## 2022-07-26 ENCOUNTER — OFFICE VISIT (OUTPATIENT)
Dept: BARIATRICS/WEIGHT MGMT | Age: 47
End: 2022-07-26

## 2022-07-26 VITALS
HEART RATE: 72 BPM | WEIGHT: 255.2 LBS | DIASTOLIC BLOOD PRESSURE: 64 MMHG | HEIGHT: 66 IN | BODY MASS INDEX: 41.01 KG/M2 | TEMPERATURE: 98.1 F | SYSTOLIC BLOOD PRESSURE: 92 MMHG

## 2022-07-26 DIAGNOSIS — F41.9 ANXIETY: ICD-10-CM

## 2022-07-26 DIAGNOSIS — Z90.3 S/P GASTRIC SLEEVE PROCEDURE: Primary | ICD-10-CM

## 2022-07-26 DIAGNOSIS — Z99.89 OSA ON CPAP: ICD-10-CM

## 2022-07-26 DIAGNOSIS — Z13.21 SCREENING FOR MALNUTRITION: ICD-10-CM

## 2022-07-26 DIAGNOSIS — F32.A DEPRESSION, UNSPECIFIED DEPRESSION TYPE: ICD-10-CM

## 2022-07-26 DIAGNOSIS — Z98.84 STATUS POST BARIATRIC SURGERY: Primary | ICD-10-CM

## 2022-07-26 DIAGNOSIS — G47.33 OSA ON CPAP: ICD-10-CM

## 2022-07-26 DIAGNOSIS — E61.1 LOW IRON: ICD-10-CM

## 2022-07-26 DIAGNOSIS — E11.9 TYPE 2 DIABETES MELLITUS WITHOUT COMPLICATION, WITHOUT LONG-TERM CURRENT USE OF INSULIN (HCC): ICD-10-CM

## 2022-07-26 DIAGNOSIS — K91.2 POSTSURGICAL MALABSORPTION: ICD-10-CM

## 2022-07-26 PROCEDURE — 99024 POSTOP FOLLOW-UP VISIT: CPT | Performed by: PHYSICIAN ASSISTANT

## 2022-07-26 RX ORDER — IBUPROFEN 200 MG
1 CAPSULE ORAL DAILY
COMMUNITY

## 2022-07-26 RX ORDER — CHOLECALCIFEROL (VITAMIN D3) 125 MCG
500 CAPSULE ORAL DAILY
COMMUNITY
End: 2022-10-17

## 2022-07-26 NOTE — PATIENT INSTRUCTIONS
Stay well hydrated. Drink a minimum of 64 oz of non-carbonated, non-caffeinated fluids daily. Nutritional education occurred during visit. Tolerating diet. Continue following with dietitian and follow their recommendations as directed. Continue  60-80  grams of protein each day. Signs and symptoms reviewed with patient that would be concerning and need her to return to office for re-evaluation. Patient will call if any questions or concerns arrise. No lifting, pushing, pulling over 20#  No abdominal exercises  Wear abdominal binder prn  Complete Lovenox as rx  Importance of physical activity discussed with patient. Increase physical activity as tolerated  Continue taking Multivitamin, Calcium and B12 as directed  Continue Omeprazole for at least 3 months post-op. Encouraged to attend support groups  Progress diet as tolerated per dietitian recommendations. 6 week labs ordered- to be drawn one week prior to next apt.   Wear CPAP - adjust pressure with pulmonary as needed  Continue to monitor blood sugars - adjust medication with family doctor as needed

## 2022-07-26 NOTE — PROGRESS NOTES
[facility]  [address]  [address] [facility]      Visit Date:  7/26/2022  Weight Management Postop Follow-up    HPI:      Lester Muller is a 49 y.o. female who is here today for 2 weeks follow up since  laparoscopic sleeve gastrectomy performed by Dr. Vj Owens  on 7/11/22. Weight today 255#. Down 8# since last visit. Down 14# since surgery. BMI 41 . Drinking 85-90 oz of fluid and 70-75grams of protein daily. Drinking 2 protein shakes daily. Tolerated cottage cheese, applesauce, scrambled egg. No carbonation. No sweets. Tolerating post-op diet. No problems with bowel movements. Taking stool softener once daily. No nausea. Emesis x 1 after drinking water too quickly. No GERD/ Reflux-continues to take Omeprazole. Denies CP/SOB. No Dizziness. No abdominal pain. No incisional discomfort. No sx of dehydration. No fever or chills. Taking Lovenox, as rx. Off pain and nasuea meds. Taking and tolerating all vitamin- Calcium 3xd/ CelebrateONE 45/ B12. Checking sugars and running 140's. Continues to hold Trulicity /Metformin. Following with PCP and if consistently over 150 will restart medications. Labs drawn with PCP at 1 week post-op. Low Iron sat at 8%. Iron 32- will continues with current vitamins and repeat Iron studies prior to next apt. Wearing CPAP Qhs. Physical Activity:  walking at times. Activity more limited over the last few days due to back pain. Current BMI: Body mass index is 41.19 kg/m².   Current Weight:   Wt Readings from Last 3 Encounters:   07/26/22 255 lb 3.2 oz (115.8 kg)   07/19/22 263 lb 3.2 oz (119.4 kg)   07/11/22 269 lb (122 kg)     Initial weight: 281  Pre-op Body EDIEDI:955      Past Medical History:  Past Medical History:   Diagnosis Date    Allergic rhinitis     Anxiety     Asthma     Depression     Diabetes ([address]     Hyperlipidemia     Psychiatric problem     depression    Sleep apnea        Past Surgical History:  Past Surgical History:   Procedure Laterality Date    CARPAL TUNNEL RELEASE       SECTION      HYSTERECTOMY (CERVIX STATUS UNKNOWN)      KNEE ARTHROSCOPY      SLEEVE GASTRECTOMY N/A 2022    Robotic Sleeve Gastrectomy performed by Juan Mercer MD at [facility]       Past Social History:  Social History     Socioeconomic History    Marital status:      Spouse name: Not on file    Number of children: 3    Years of education: Not on file    Highest education level: Not on file   Occupational History    Not on file   Tobacco Use    Smoking status: Never    Smokeless tobacco: Never   Vaping Use    Vaping Use: Never used   Substance and Sexual Activity    Alcohol use: Yes     Comment: socially    Drug use: No    Sexual activity: Yes   Other Topics Concern    Not on file   Social History Narrative    Not on file     Social Determinants of Health     Financial Resource Strain: Low Risk     Difficulty of Paying Living Expenses: Not very hard   Food Insecurity: Food Insecurity Present    Worried About Running Out of Food in the Last Year: Sometimes true    Ran Out of Food in the Last Year: Sometimes true   Transportation Needs: No Transportation Needs    Lack of Transportation (Medical): No    Lack of Transportation (Non-Medical): No   Physical Activity: Sufficiently Active    Days of Exercise per Week: 5 days    Minutes of Exercise per Session: 30 min   Stress: Stress Concern Present    Feeling of Stress : Rather much   Social Connections: Moderately Integrated    Frequency of Communication with Friends and Family: More than three times a week    Frequency of Social Gatherings with Friends and Family:  Three times a week    Attends Hinduism Services: More than 4 times per year    Active Member of Clubs or Organizations: No    Attends Club or Organization Meetings: Patient refused    Marital Status:    Intimate Partner Violence: Not At Risk    Fear of Current or Ex-Partner: No Emotionally Abused: No    Physically Abused: No    Sexually Abused: No   Housing Stability: Low Risk     Unable to Pay for Housing in the Last Year: No    Number of Places Lived in the Last Year: 1    Unstable Housing in the Last Year: No        Medications:   Current Outpatient Medications   Medication Sig Dispense Refill    calcium citrate (CALCITRATE) 950 (200 Ca) MG tablet Take 1 tablet by mouth in the morning. vitamin B-12 (CYANOCOBALAMIN) 500 MCG tablet Take 500 mcg by mouth in the morning. Docusate Sodium 100 MG TABS Take 100 mg by mouth 2 times daily Take as needed to prevent constipation 30 tablet 1    enoxaparin (LOVENOX) 40 MG/0.4ML Inject 0.4 mLs into the skin daily 14 each 0    Multiple Vitamins-Minerals (THERAPEUTIC MULTIVITAMIN-MINERALS) tablet Take 1 tablet by mouth daily      CPAP Machine MISC by Does not apply route Please check patient's CPAP machine for proper functioning by [employer]. She was prescribed with a CPAP with a pressure of 11 cm of water with room air. 1 each 0    traZODone (DESYREL) 150 MG tablet Take 150 mg by mouth nightly      omeprazole (PRILOSEC) 40 MG delayed release capsule Take 1 capsule by mouth daily      oxybutynin (DITROPAN-XL) 5 MG extended release tablet Take 1 tablet by mouth daily       atorvastatin (LIPITOR) 40 MG tablet TAKE ONE TABLET BY MOUTH DAILY 30 tablet 3    Cetirizine HCl 10 MG CAPS Take 1 tablet by mouth daily      montelukast (SINGULAIR) 10 MG tablet Take 10 mg by mouth nightly      rOPINIRole (REQUIP) 4 MG tablet Take 4 mg by mouth nightly       albuterol sulfate  (90 BASE) MCG/ACT inhaler Inhale 2 puffs into the lungs every 6 hours as needed for Wheezing      Blood Glucose Monitoring Suppl (ACCU-CHEK ROD PLUS) w/Device KIT Check blood sugar once daily 1 kit 0    glucose blood VI test strips (ACCU-CHEK ROD PLUS) strip Check blood sugar once daily 100 each 3    Lancets MISC Use to test blood glucose level 1 time per day.  Diagnosis: E11.9 100 each 1     No current facility-administered medications for this visit. Allergies: Allergies   Allergen Reactions    Latex Rash    Keflex [Cephalexin] Rash    Zofran [Ondansetron] Hallucinations    Betadine [Povidone Iodine] Itching and Swelling     Iv site - main area from parekh catheter    Bacitracin Rash    Neosporin [Neomycin-Polymyxin-Gramicidin] Rash    Pcn [Penicillins] Hives, Swelling and Rash    Rocephin [Ceftriaxone] Rash       Subjective:    Review of Systems:  Constitutional: Denies any fever, chills, fatigue. Wound: Denies any rash, skin color changes or wound problems. Resp: Denies any cough, shortness of breath. CV: Denies any chest pain, orthopnea or syncope. MS: Denies myalgias, (+)arthralgias. GI: Denies any nausea, vomiting, diarrhea, constipation, melena, hematochezia. (+) minimal incisional discomfort. : Denies any hematuria, hesitancy or dysuria. NEURO: Denies seizures, headache. Objective:    BP 92/64 (Site: Right Upper Arm, Position: Sitting, Cuff Size: Large Adult)   Pulse 72   Temp 98.1 °F (36.7 °C) (Oral)   Ht 5' 6\" (1.676 m)   Wt 255 lb 3.2 oz (115.8 kg)   BMI 41.19 kg/m²     Physical Examination:   Constitutional: Alert and oriented to person, place and time. Well-developed, well- nourished. Head: Normocephalic and atraumatic  Neck: Supple. Eyes: EOMI b/l. Conjunctivae normal.  No scleral icterus. Respiratory: Effort normal. No respiratory distress. Abd: Incisions well healed. Non-tender. No sign of infection. Ext:  Movement x 4. No edema  Skin; Warm and dry, no visible rashes, lesions or ulcers.    Neuro: Cranial Nerves Grossly Intact; nml coordination      Labs:  CBC   Lab Results   Component Value Date/Time    WBC 8.7 05/02/2022 09:00 PM    RBC 4.19 05/02/2022 09:00 PM    HGB 11.3 07/12/2022 06:42 AM    HCT 37.7 07/12/2022 06:42 AM    MCV 88.8 05/02/2022 09:00 PM    MCH 28.4 05/02/2022 09:00 PM    MCHC 32.0 05/02/2022 09:00 PM    RDW 15.0 07/27/2017 06:33 AM     05/02/2022 09:00 PM    MPV 11.0 05/02/2022 09:00 PM    RBCMORP NORMAL 07/27/2017 06:33 AM    SEGSPCT 61.5 05/02/2022 09:00 PM    LABLYMP 25.2 05/02/2022 09:00 PM    MONOPCT 7.8 05/02/2022 09:00 PM    LABEOS 3.9 05/02/2022 09:00 PM    BASO 0.8 05/02/2022 09:00 PM    NRBC 0 05/02/2022 09:00 PM    ANISOCYTOSIS 1+ 07/27/2017 06:33 AM    SEGSABS 5.4 05/02/2022 09:00 PM    LYMPHSABS 2.2 05/02/2022 09:00 PM    MONOSABS 0.7 05/02/2022 09:00 PM    EOSABS 0.3 05/02/2022 09:00 PM    BASOSABS 0.1 05/02/2022 09:00 PM        BMP/CMP   Lab Results   Component Value Date/Time    GLUCOSE 184 07/12/2022 06:42 AM    CREATININE 0.4 07/12/2022 06:42 AM    BUN 9 07/12/2022 06:42 AM     07/12/2022 06:42 AM    K 3.8 07/12/2022 06:42 AM     07/12/2022 06:42 AM    CO2 19 07/12/2022 06:42 AM    CALCIUM 8.1 07/12/2022 06:42 AM    AST 19 05/02/2022 09:00 PM    ALKPHOS 85 05/02/2022 09:00 PM    PROT 7.0 05/02/2022 09:00 PM    LABALBU 4.0 05/02/2022 09:00 PM    BILITOT 0.4 05/02/2022 09:00 PM    ALT 30 05/02/2022 09:00 PM        PREALBUMIN   Lab Results   Component Value Date/Time    PREALBUMIN 21.4 10/04/2021 11:06 AM        VITAMIN B12   Lab Results   Component Value Date/Time    KRROCOPA95 784 05/25/2022 02:51 PM        24 HOUR URINE CALCIUM   No results found for: Aba Finney, CALCIUMUR     VITAMIN D   Lab Results   Component Value Date/Time    VITD25 32 05/25/2022 02:51 PM        VITAMIN B1/ THIAMINE   Lab Results   Component Value Date/Time    [id number] 123 05/25/2022 02:51 PM        RBC FOLATE   Lab Results   Component Value Date/Time    FOLATE > 20.0 05/25/2022 02:51 PM        LIPID SCREEN (FASTING)   Lab Results   Component Value Date/Time    CHOL 122 05/25/2022 02:51 PM    TRIG 75 05/25/2022 02:51 PM    HDL 65 05/25/2022 02:51 PM    LDLCALC 42 05/25/2022 02:51 PM   ,     HGA1C (T2DM ONLY)   Lab Results   Component Value Date/Time    LABA1C 7.1 05/25/2022 02:51 PM    AVGG 153 05/25/2022 02:51 PM        TSH   Lab Results   Component Value Date/Time    TSH 0.770 05/25/2022 02:51 PM        IRON   Lab Results   Component Value Date/Time    IRON 51 05/25/2022 02:51 PM        IONIZED CALCIUM   No results found for: RAFIA EVANS      Assessment:       Diagnosis Orders   1. S/P gastric sleeve procedure  CBC with Auto Differential    Comprehensive Metabolic Panel    Prealbumin      2. BMI 40.0-44.9, adult (HCC)  CBC with Auto Differential    Comprehensive Metabolic Panel    Prealbumin      3. Postsurgical malabsorption  CBC with Auto Differential    Comprehensive Metabolic Panel    Prealbumin      4. Screening for malnutrition  CBC with Auto Differential    Comprehensive Metabolic Panel    Prealbumin      5. Anxiety        6. Depression, unspecified depression type        7. Type 2 diabetes mellitus without complication, without long-term current use of insulin ([address]        8. DARIEN on CPAP        9. Low iron  CBC with Auto Differential    Iron    Iron Binding Capacity    Ferritin          Plan:    Stay well hydrated. Drink a minimum of 64 oz of non-carbonated, non-caffeinated fluids daily. Nutritional education occurred during visit. Tolerating diet. Continue following with dietitian and follow their recommendations as directed. Continue  60-80  grams of protein each day. Signs and symptoms reviewed with patient that would be concerning and need her to return to office for re-evaluation. Patient will call if any questions or concerns arrise. No lifting, pushing, pulling over 20#  No abdominal exercises  Wear abdominal binder prn  Complete Lovenox as rx  Importance of physical activity discussed with patient. Increase physical activity as tolerated  Continue taking Multivitamin, Calcium and B12 as directed  Continue Omeprazole for at least 3 months post-op. Encouraged to attend support groups  Progress diet as tolerated per dietitian recommendations.   6 week labs ordered- to be drawn one week prior to next apt. Wear CPAP - adjust pressure with pulmonary as needed  Continue to monitor blood sugars - adjust medication with family doctor as needed  Return in about 1 month (around 8/26/2022) for postop follow up. I spent over 20 minutes with the patient, with greater that 50% of that time spent on face counseling for nutrition and exercise.     Electronically signed by DANIELITO Cope on 7/26/2022 at 12:35 PM

## 2022-08-09 ENCOUNTER — OFFICE VISIT (OUTPATIENT)
Dept: BARIATRICS/WEIGHT MGMT | Age: 47
End: 2022-08-09

## 2022-08-09 VITALS
BODY MASS INDEX: 40.43 KG/M2 | WEIGHT: 251.6 LBS | TEMPERATURE: 97.3 F | DIASTOLIC BLOOD PRESSURE: 74 MMHG | HEIGHT: 66 IN | SYSTOLIC BLOOD PRESSURE: 116 MMHG | HEART RATE: 68 BPM

## 2022-08-09 DIAGNOSIS — Z90.3 S/P GASTRIC SLEEVE PROCEDURE: Primary | ICD-10-CM

## 2022-08-09 DIAGNOSIS — Z48.89 ENCOUNTER FOR POST SURGICAL WOUND CHECK: ICD-10-CM

## 2022-08-09 PROCEDURE — 99024 POSTOP FOLLOW-UP VISIT: CPT | Performed by: PHYSICIAN ASSISTANT

## 2022-08-09 NOTE — PROGRESS NOTES
[facility]  [address]  [place]      Visit Date:  2022  Weight Management Postop Follow-up    HPI:      Zoran Dias is a 49 y.o. female who is here today for post-op wound check . She is almost 4 weeks post-op sleeve gastrectomy performed by Dr. Fabricio Dunaway  on 22. Has been consistent with getting in plenty of fluids and protein. Doing well overall. Noticed redness and irritation to right lateral incisions a couple days ago. Has had a small amount of yellowish drainage. Mildly tender. No fever/chills. Current BMI: Body mass index is 40.61 kg/m².   Current Weight:   Wt Readings from Last 3 Encounters:   22 251 lb 9.6 oz (114.1 kg)   22 255 lb 3.2 oz (115.8 kg)   22 263 lb 3.2 oz (119.4 kg)     Initial weight: 281  Pre-op Body CERDCB:387      Past Medical History:  Past Medical History:   Diagnosis Date    Allergic rhinitis     Anxiety     Asthma     Depression     Diabetes ([address]     Hyperlipidemia     Psychiatric problem     depression    Sleep apnea        Past Surgical History:  Past Surgical History:   Procedure Laterality Date    CARPAL TUNNEL RELEASE       SECTION      HYSTERECTOMY (CERVIX STATUS UNKNOWN)      KNEE ARTHROSCOPY      SLEEVE GASTRECTOMY N/A 2022    Robotic Sleeve Gastrectomy performed by Cayla Garrett MD at [place] [facility]       Past Social History:  Social History     Socioeconomic History    Marital status:      Spouse name: Not on file    Number of children: 3    Years of education: Not on file    Highest education level: Not on file   Occupational History    Not on file   Tobacco Use    Smoking status: Never    Smokeless tobacco: Never   Vaping Use    Vaping Use: Never used   Substance and Sexual Activity    Alcohol use: Yes     Comment: socially    Drug use: No    Sexual activity: Yes   Other Topics Concern    Not on file   Social History Narrative    Not on file     Social Determinants of Health     Financial Resource Strain: Low Risk     Difficulty of Paying Living Expenses: Not very hard   Food Insecurity: Food Insecurity Present    Worried About [address] in the Last Year: Sometimes true    Ran Out of Food in the Last Year: Sometimes true   Transportation Needs: No Transportation Needs    Lack of Transportation (Medical): No    Lack of Transportation (Non-Medical): No   Physical Activity: Sufficiently Active    Days of Exercise per Week: 5 days    Minutes of Exercise per Session: 30 min   Stress: Stress Concern Present    Feeling of Stress : Rather much   Social Connections: Moderately Integrated    Frequency of Communication with Friends and Family: More than three times a week    Frequency of Social Gatherings with Friends and Family: Three times a week    Attends Yazidism Services: More than 4 times per year    Active Member of Clubs or Organizations: No    Attends Club or Organization Meetings: Patient refused    Marital Status:    Intimate Partner Violence: Not At Risk    Fear of Current or Ex-Partner: No    Emotionally Abused: No    Physically Abused: No    Sexually Abused: No   Housing Stability: Low Risk     Unable to Pay for Housing in the Last Year: No    Number of [place] in the Last Year: 1    Unstable Housing in the Last Year: No        Medications:   Current Outpatient Medications   Medication Sig Dispense Refill    calcium citrate (CALCITRATE) 950 (200 Ca) MG tablet Take 1 tablet by mouth in the morning. vitamin B-12 (CYANOCOBALAMIN) 500 MCG tablet Take 500 mcg by mouth in the morning. Docusate Sodium 100 MG TABS Take 100 mg by mouth 2 times daily Take as needed to prevent constipation 30 tablet 1    Multiple Vitamins-Minerals (THERAPEUTIC MULTIVITAMIN-MINERALS) tablet Take 1 tablet by mouth daily      CPAP Machine MISC by Does not apply route Please check patient's CPAP machine for proper functioning by [employer]. She was prescribed with a CPAP with a pressure of 11 cm of water with room air. 1 each 0    traZODone (DESYREL) 150 MG tablet Take 150 mg by mouth nightly      omeprazole (PRILOSEC) 40 MG delayed release capsule Take 1 capsule by mouth daily      oxybutynin (DITROPAN-XL) 5 MG extended release tablet Take 1 tablet by mouth daily       atorvastatin (LIPITOR) 40 MG tablet TAKE ONE TABLET BY MOUTH DAILY 30 tablet 3    Blood Glucose Monitoring Suppl (ACCU-CHEK ROD PLUS) w/Device KIT Check blood sugar once daily 1 kit 0    glucose blood VI test strips (ACCU-CHEK ROD PLUS) strip Check blood sugar once daily 100 each 3    Lancets MISC Use to test blood glucose level 1 time per day. Diagnosis: E11.9 100 each 1    Cetirizine HCl 10 MG CAPS Take 1 tablet by mouth daily      montelukast (SINGULAIR) 10 MG tablet Take 10 mg by mouth nightly      rOPINIRole (REQUIP) 4 MG tablet Take 4 mg by mouth nightly       albuterol sulfate  (90 BASE) MCG/ACT inhaler Inhale 2 puffs into the lungs every 6 hours as needed for Wheezing      enoxaparin (LOVENOX) 40 MG/0.4ML Inject 0.4 mLs into the skin daily (Patient not taking: Reported on 8/9/2022) 14 each 0     No current facility-administered medications for this visit. Allergies: Allergies   Allergen Reactions    Latex Rash    Keflex [Cephalexin] Rash    Zofran [Ondansetron] Hallucinations    Betadine [Povidone Iodine] Itching and Swelling     Iv site - main area from parekh catheter    Bacitracin Rash    Neosporin [Neomycin-Polymyxin-Gramicidin] Rash    Pcn [Penicillins] Hives, Swelling and Rash    Rocephin [Ceftriaxone] Rash       Subjective:    Review of Systems:  Constitutional: Denies any fever, chills, fatigue. Wound: Denies any rash, skin color changes  (+)wound problems. Resp: Denies any cough, shortness of breath. CV: Denies any chest pain, orthopnea or syncope. MS: Denies myalgias, (+)arthralgias.   GI: Denies any nausea, vomiting, diarrhea, constipation, melena, hematochezia. (+) minimal incisional discomfort. : Denies any hematuria, hesitancy or dysuria. NEURO: Denies seizures, headache. Objective:    /74 (Site: Right Upper Arm, Position: Sitting, Cuff Size: Large Adult)   Pulse 68   Temp 97.3 °F (36.3 °C) (Infrared)   Ht 5' 6\" (1.676 m)   Wt 251 lb 9.6 oz (114.1 kg)   BMI 40.61 kg/m²     Physical Examination:   Constitutional: Alert and oriented to person, place and time. Well-developed, well- nourished. Head: Normocephalic and atraumatic  Neck: Supple. Eyes: EOMI b/l. Conjunctivae normal.  No scleral icterus. Respiratory: Effort normal. No respiratory distress. Abd:  Right lateral incision with visible stitch. Minimal yellowish drainage on bandaid. Minimal erythema surrounding incision. Stitch removed. Incision cleaned. Steri-strip applied. Circled red area with pen. Will monitor. If redness progresses or continues to have drainage will call office to start antibiotics. Ext:  Movement x 4. No edema  Skin; Warm and dry, no visible rashes, lesions or ulcers.    Neuro: Cranial Nerves Grossly Intact; nml coordination        Labs:  CBC   Lab Results   Component Value Date/Time    WBC 8.7 05/02/2022 09:00 PM    RBC 4.19 05/02/2022 09:00 PM    HGB 11.3 07/12/2022 06:42 AM    HCT 37.7 07/12/2022 06:42 AM    MCV 88.8 05/02/2022 09:00 PM    MCH 28.4 05/02/2022 09:00 PM    MCHC 32.0 05/02/2022 09:00 PM    RDW 15.0 07/27/2017 06:33 AM     05/02/2022 09:00 PM    MPV 11.0 05/02/2022 09:00 PM    RBCMORP NORMAL 07/27/2017 06:33 AM    SEGSPCT 61.5 05/02/2022 09:00 PM    LABLYMP 25.2 05/02/2022 09:00 PM    MONOPCT 7.8 05/02/2022 09:00 PM    LABEOS 3.9 05/02/2022 09:00 PM    BASO 0.8 05/02/2022 09:00 PM    NRBC 0 05/02/2022 09:00 PM    ANISOCYTOSIS 1+ 07/27/2017 06:33 AM    SEGSABS 5.4 05/02/2022 09:00 PM    LYMPHSABS 2.2 05/02/2022 09:00 PM    MONOSABS 0.7 05/02/2022 09:00 PM    EOSABS 0.3 05/02/2022 09:00 PM    BASOSABS 0.1 05/02/2022 09:00 PM BMP/CMP   Lab Results   Component Value Date/Time    GLUCOSE 184 07/12/2022 06:42 AM    CREATININE 0.4 07/12/2022 06:42 AM    BUN 9 07/12/2022 06:42 AM     07/12/2022 06:42 AM    K 3.8 07/12/2022 06:42 AM     07/12/2022 06:42 AM    CO2 19 07/12/2022 06:42 AM    CALCIUM 8.1 07/12/2022 06:42 AM    AST 19 05/02/2022 09:00 PM    ALKPHOS 85 05/02/2022 09:00 PM    PROT 7.0 05/02/2022 09:00 PM    LABALBU 4.0 05/02/2022 09:00 PM    BILITOT 0.4 05/02/2022 09:00 PM    ALT 30 05/02/2022 09:00 PM        PREALBUMIN   Lab Results   Component Value Date/Time    PREALBUMIN 21.4 10/04/2021 11:06 AM        VITAMIN B12   Lab Results   Component Value Date/Time    [id number] 784 05/25/2022 02:51 PM        24 HOUR URINE CALCIUM   No results found for: ROWDY StewartUR     VITAMIN D   Lab Results   Component Value Date/Time    VITD25 32 05/25/2022 02:51 PM        VITAMIN B1/ THIAMINE   Lab Results   Component Value Date/Time    [id number] 123 05/25/2022 02:51 PM        RBC FOLATE   Lab Results   Component Value Date/Time    FOLATE > 20.0 05/25/2022 02:51 PM        LIPID SCREEN (FASTING)   Lab Results   Component Value Date/Time    CHOL 122 05/25/2022 02:51 PM    TRIG 75 05/25/2022 02:51 PM    HDL 65 05/25/2022 02:51 PM    LDLCALC 42 05/25/2022 02:51 PM   ,     HGA1C (T2DM ONLY)   Lab Results   Component Value Date/Time    LABA1C 7.1 05/25/2022 02:51 PM    AVGG 153 05/25/2022 02:51 PM        TSH   Lab Results   Component Value Date/Time    TSH 0.770 05/25/2022 02:51 PM        IRON   Lab Results   Component Value Date/Time    IRON 51 05/25/2022 02:51 PM        IONIZED CALCIUM   No results found for: RAFIA EVANS      Assessment:       Diagnosis Orders   1. S/P gastric sleeve procedure        2. Encounter for post surgical wound check              Plan:    Stay well hydrated. Drink a minimum of 64 oz of non-carbonated, non-caffeinated fluids daily. Nutritional education occurred during visit. Tolerating diet. Continue following with dietitian and follow their recommendations as directed. Continue  60-80  grams of protein each day. Signs and symptoms reviewed with patient that would be concerning and need her to return to office for re-evaluation. Patient will call if any questions or concerns arrise. No lifting, pushing, pulling over 20#  No abdominal exercises  Wear abdominal binder prn  Continue taking Multivitamin, Calcium and B12 as directed  Monitor incision- if redness progresses, any yellow-green drainage, warmth- call office and will start antibiotics ( Bactrim)  Ice to incision 15-20 minutes 3-4 times daily. Return in about 2 weeks (around 8/23/2022). I spent over 20 minutes with the patient, with greater that 50% of that time spent on face counseling for nutrition and exercise.     Electronically signed by DANIELITO Cormier on 8/9/2022 at 1:05 PM

## 2022-08-09 NOTE — PATIENT INSTRUCTIONS
Stay well hydrated. Drink a minimum of 64 oz of non-carbonated, non-caffeinated fluids daily. Nutritional education occurred during visit. Tolerating diet. Continue following with dietitian and follow their recommendations as directed. Continue  60-80  grams of protein each day. Signs and symptoms reviewed with patient that would be concerning and need her to return to office for re-evaluation. Patient will call if any questions or concerns arrise. No lifting, pushing, pulling over 20#  No abdominal exercises  Wear abdominal binder prn  Continue taking Multivitamin, Calcium and B12 as directed  Monitor incision- if redness progresses, any yellow-green drainage, warmth- call office and will start antibiotics ( Bactrim)  Ice to incision 15-20 minutes 3-4 times daily.

## 2022-08-12 ENCOUNTER — HOSPITAL ENCOUNTER (EMERGENCY)
Age: 47
Discharge: HOME | End: 2022-08-12
Attending: EMERGENCY MEDICINE
Payer: COMMERCIAL

## 2022-08-12 ENCOUNTER — APPOINTMENT (OUTPATIENT)
Dept: CT IMAGING | Age: 47
End: 2022-08-12
Payer: COMMERCIAL

## 2022-08-12 VITALS
WEIGHT: 251 LBS | TEMPERATURE: 98.8 F | DIASTOLIC BLOOD PRESSURE: 87 MMHG | SYSTOLIC BLOOD PRESSURE: 115 MMHG | OXYGEN SATURATION: 97 % | RESPIRATION RATE: 18 BRPM | BODY MASS INDEX: 40.51 KG/M2 | HEART RATE: 62 BPM

## 2022-08-12 DIAGNOSIS — S39.011A ABDOMINAL WALL STRAIN, INITIAL ENCOUNTER: ICD-10-CM

## 2022-08-12 DIAGNOSIS — R10.9 ABDOMINAL PAIN, UNSPECIFIED ABDOMINAL LOCATION: Primary | ICD-10-CM

## 2022-08-12 LAB
ALBUMIN SERPL-MCNC: 4.2 G/DL (ref 3.5–5.1)
ALP BLD-CCNC: 78 U/L (ref 38–126)
ALT SERPL-CCNC: 28 U/L (ref 11–66)
ANION GAP SERPL CALCULATED.3IONS-SCNC: 11 MEQ/L (ref 8–16)
AST SERPL-CCNC: 23 U/L (ref 5–40)
BACTERIA: ABNORMAL /HPF
BASOPHILS # BLD: 1.1 %
BASOPHILS ABSOLUTE: 0.1 THOU/MM3 (ref 0–0.1)
BILIRUB SERPL-MCNC: 0.6 MG/DL (ref 0.3–1.2)
BILIRUBIN URINE: NEGATIVE
BLOOD, URINE: NEGATIVE
BUN BLDV-MCNC: 15 MG/DL (ref 7–22)
C-REACTIVE PROTEIN: < 0.3 MG/DL (ref 0–1)
CALCIUM SERPL-MCNC: 9.6 MG/DL (ref 8.5–10.5)
CASTS 2: ABNORMAL /LPF
CASTS UA: ABNORMAL /LPF
CHARACTER, URINE: CLEAR
CHLORIDE BLD-SCNC: 104 MEQ/L (ref 98–111)
CO2: 23 MEQ/L (ref 23–33)
COLOR: ABNORMAL
CREAT SERPL-MCNC: 0.6 MG/DL (ref 0.4–1.2)
CRYSTALS, UA: ABNORMAL
EOSINOPHIL # BLD: 5.3 %
EOSINOPHILS ABSOLUTE: 0.3 THOU/MM3 (ref 0–0.4)
EPITHELIAL CELLS, UA: ABNORMAL /HPF
ERYTHROCYTE [DISTWIDTH] IN BLOOD BY AUTOMATED COUNT: 14.5 % (ref 11.5–14.5)
ERYTHROCYTE [DISTWIDTH] IN BLOOD BY AUTOMATED COUNT: 46.5 FL (ref 35–45)
GFR SERPL CREATININE-BSD FRML MDRD: > 90 ML/MIN/1.73M2
GLUCOSE BLD-MCNC: 163 MG/DL (ref 70–108)
GLUCOSE URINE: NEGATIVE MG/DL
HCT VFR BLD CALC: 39.1 % (ref 37–47)
HEMOGLOBIN: 12.6 GM/DL (ref 12–16)
IMMATURE GRANS (ABS): 0.01 THOU/MM3 (ref 0–0.07)
IMMATURE GRANULOCYTES: 0.2 %
KETONES, URINE: ABNORMAL
LEUKOCYTE ESTERASE, URINE: ABNORMAL
LIPASE: 26 U/L (ref 5.6–51.3)
LYMPHOCYTES # BLD: 28.3 %
LYMPHOCYTES ABSOLUTE: 1.9 THOU/MM3 (ref 1–4.8)
MCH RBC QN AUTO: 28.3 PG (ref 26–33)
MCHC RBC AUTO-ENTMCNC: 32.2 GM/DL (ref 32.2–35.5)
MCV RBC AUTO: 87.9 FL (ref 81–99)
MISCELLANEOUS 2: ABNORMAL
MONOCYTES # BLD: 8.7 %
MONOCYTES ABSOLUTE: 0.6 THOU/MM3 (ref 0.4–1.3)
NITRITE, URINE: NEGATIVE
NUCLEATED RED BLOOD CELLS: 0 /100 WBC
OSMOLALITY CALCULATION: 280.1 MOSMOL/KG (ref 275–300)
PH UA: 5.5 (ref 5–9)
PLATELET # BLD: 191 THOU/MM3 (ref 130–400)
PMV BLD AUTO: 12 FL (ref 9.4–12.4)
POTASSIUM REFLEX MAGNESIUM: 3.8 MEQ/L (ref 3.5–5.2)
PROTEIN UA: NEGATIVE
RBC # BLD: 4.45 MILL/MM3 (ref 4.2–5.4)
RBC URINE: ABNORMAL /HPF
RENAL EPITHELIAL, UA: ABNORMAL
SEG NEUTROPHILS: 56.4 %
SEGMENTED NEUTROPHILS ABSOLUTE COUNT: 3.7 THOU/MM3 (ref 1.8–7.7)
SODIUM BLD-SCNC: 138 MEQ/L (ref 135–145)
SPECIFIC GRAVITY, URINE: > 1.03 (ref 1–1.03)
TOTAL PROTEIN: 7.4 G/DL (ref 6.1–8)
UROBILINOGEN, URINE: 0.2 EU/DL (ref 0–1)
WBC # BLD: 6.6 THOU/MM3 (ref 4.8–10.8)
WBC UA: ABNORMAL /HPF
YEAST: ABNORMAL

## 2022-08-12 PROCEDURE — 83690 ASSAY OF LIPASE: CPT

## 2022-08-12 PROCEDURE — 85025 COMPLETE CBC W/AUTO DIFF WBC: CPT

## 2022-08-12 PROCEDURE — 99285 EMERGENCY DEPT VISIT HI MDM: CPT

## 2022-08-12 PROCEDURE — 93005 ELECTROCARDIOGRAM TRACING: CPT | Performed by: EMERGENCY MEDICINE

## 2022-08-12 PROCEDURE — 80053 COMPREHEN METABOLIC PANEL: CPT

## 2022-08-12 PROCEDURE — 74177 CT ABD & PELVIS W/CONTRAST: CPT

## 2022-08-12 PROCEDURE — 6360000002 HC RX W HCPCS: Performed by: EMERGENCY MEDICINE

## 2022-08-12 PROCEDURE — 6360000004 HC RX CONTRAST MEDICATION: Performed by: EMERGENCY MEDICINE

## 2022-08-12 PROCEDURE — 96374 THER/PROPH/DIAG INJ IV PUSH: CPT

## 2022-08-12 PROCEDURE — 81001 URINALYSIS AUTO W/SCOPE: CPT

## 2022-08-12 PROCEDURE — 86140 C-REACTIVE PROTEIN: CPT

## 2022-08-12 RX ORDER — ORPHENADRINE CITRATE 100 MG/1
100 TABLET, EXTENDED RELEASE ORAL 2 TIMES DAILY
Qty: 10 TABLET | Refills: 0 | Status: SHIPPED | OUTPATIENT
Start: 2022-08-12 | End: 2022-08-17

## 2022-08-12 RX ORDER — MORPHINE SULFATE 10 MG/ML
6 INJECTION, SOLUTION INTRAMUSCULAR; INTRAVENOUS ONCE
Status: COMPLETED | OUTPATIENT
Start: 2022-08-12 | End: 2022-08-12

## 2022-08-12 RX ADMIN — MORPHINE SULFATE 6 MG: 10 INJECTION, SOLUTION INTRAMUSCULAR; INTRAVENOUS at 18:27

## 2022-08-12 RX ADMIN — IOPAMIDOL 80 ML: 755 INJECTION, SOLUTION INTRAVENOUS at 19:46

## 2022-08-12 ASSESSMENT — PAIN DESCRIPTION - DESCRIPTORS: DESCRIPTORS: ACHING

## 2022-08-12 ASSESSMENT — PAIN DESCRIPTION - PAIN TYPE: TYPE: ACUTE PAIN

## 2022-08-12 ASSESSMENT — ENCOUNTER SYMPTOMS
SORE THROAT: 0
BACK PAIN: 0
RHINORRHEA: 0
CHEST TIGHTNESS: 0
COUGH: 0
ABDOMINAL PAIN: 1
NAUSEA: 0
WHEEZING: 0
DIARRHEA: 0
ABDOMINAL DISTENTION: 0
VOMITING: 0
EYE REDNESS: 0
CONSTIPATION: 0
SHORTNESS OF BREATH: 0

## 2022-08-12 ASSESSMENT — PAIN DESCRIPTION - ONSET: ONSET: ON-GOING

## 2022-08-12 ASSESSMENT — PAIN DESCRIPTION - LOCATION: LOCATION: ABDOMEN

## 2022-08-12 ASSESSMENT — PAIN DESCRIPTION - ORIENTATION: ORIENTATION: RIGHT;LEFT

## 2022-08-12 ASSESSMENT — PAIN - FUNCTIONAL ASSESSMENT: PAIN_FUNCTIONAL_ASSESSMENT: 0-10

## 2022-08-12 ASSESSMENT — PAIN SCALES - GENERAL: PAINLEVEL_OUTOF10: 8

## 2022-08-12 ASSESSMENT — PAIN DESCRIPTION - FREQUENCY: FREQUENCY: CONTINUOUS

## 2022-08-12 NOTE — ED PROVIDER NOTES
[facility]OUN[facility]          Pt Name: Jody Dawkins  MRN: [id number]  [place] 1975  Date of evaluation: 8/12/2022  Emergency Physician: Shahrzad Faith DO    CHIEF COMPLAINT       Chief Complaint   Patient presents with    Abdominal Pain     History obtained from the patient. HISTORY OF PRESENT ILLNESS    HPI  Jody Dawkins is a 49 y.o. female who presents to the emergency department for evaluation of abdominal pain. Patient is 1 month status post robotic sleeve gastrectomy. Per Dr. Chris Cooper. She states she had been doing well in the postoperative period. She states then today she feels like she may have overdid it. She reports that she was cleaning the house moved a kitchen aid mixer. And then afterwards she started experiencing sharp stabbing left-sided abdominal pain and the sensation something is twisted inside. Pain is worse when she moves and rotates and bends. She states she called the nurse line and was referred to the ED. No home treatment tried. Last BM today. No emesis. No fever. The patient has no other acute complaints at this time. REVIEW OF SYSTEMS   Review of Systems   Constitutional:  Negative for chills and fever. HENT:  Negative for congestion, rhinorrhea and sore throat. Eyes:  Negative for redness and visual disturbance. Respiratory:  Negative for cough, chest tightness, shortness of breath and wheezing. Cardiovascular:  Negative for chest pain and leg swelling. Gastrointestinal:  Positive for abdominal pain. Negative for abdominal distention, constipation, diarrhea, nausea and vomiting. Endocrine: Negative for polydipsia and polyuria. Genitourinary:  Negative for decreased urine volume, dysuria and urgency. Musculoskeletal:  Negative for back pain and myalgias. Skin:  Negative for rash and wound. Neurological:  Negative for light-headedness and headaches.    Hematological: Does not bruise/bleed easily. Psychiatric/Behavioral:  Negative for decreased concentration and dysphoric mood. PAST MEDICAL AND SURGICAL HISTORY     Past Medical History:   Diagnosis Date    Allergic rhinitis     Anxiety     Asthma     Depression     Diabetes ([address]     Hyperlipidemia     Psychiatric problem     depression    Sleep apnea      Past Surgical History:   Procedure Laterality Date    CARPAL TUNNEL RELEASE       SECTION      HYSTERECTOMY (CERVIX STATUS UNKNOWN)      KNEE ARTHROSCOPY      SLEEVE GASTRECTOMY N/A 2022    Robotic Sleeve Gastrectomy performed by Jacqueline Bland MD at [address]   No current facility-administered medications for this encounter. Current Outpatient Medications:     orphenadrine (NORFLEX) 100 MG extended release tablet, Take 1 tablet by mouth in the morning and 1 tablet before bedtime. Do all this for 5 days. , Disp: 10 tablet, Rfl: 0    calcium citrate (CALCITRATE) 950 (200 Ca) MG tablet, Take 1 tablet by mouth in the morning., Disp: , Rfl:     vitamin B-12 (CYANOCOBALAMIN) 500 MCG tablet, Take 500 mcg by mouth in the morning., Disp: , Rfl:     Docusate Sodium 100 MG TABS, Take 100 mg by mouth 2 times daily Take as needed to prevent constipation, Disp: 30 tablet, Rfl: 1    enoxaparin (LOVENOX) 40 MG/0.4ML, Inject 0.4 mLs into the skin daily (Patient not taking: Reported on 2022), Disp: 14 each, Rfl: 0    Multiple Vitamins-Minerals (THERAPEUTIC MULTIVITAMIN-MINERALS) tablet, Take 1 tablet by mouth daily, Disp: , Rfl:     CPAP Machine MISC, by Does not apply route Please check patient's CPAP machine for proper functioning by [place].   She was prescribed with a CPAP with a pressure of 11 cm of water with room air., Disp: 1 each, Rfl: 0    traZODone (DESYREL) 150 MG tablet, Take 150 mg by mouth nightly, Disp: , Rfl:     omeprazole (PRILOSEC) 40 MG delayed release capsule, Take 1 capsule by mouth daily, Disp: , Rfl:     oxybutynin (DITROPAN-XL) 5 MG extended release tablet, Take 1 tablet by mouth daily , Disp: , Rfl:     atorvastatin (LIPITOR) 40 MG tablet, TAKE ONE TABLET BY MOUTH DAILY, Disp: 30 tablet, Rfl: 3    Blood Glucose Monitoring Suppl (ACCU-CHEK ROD PLUS) w/Device KIT, Check blood sugar once daily, Disp: 1 kit, Rfl: 0    glucose blood VI test strips (ACCU-CHEK ROD PLUS) strip, Check blood sugar once daily, Disp: 100 each, Rfl: 3    Lancets MISC, Use to test blood glucose level 1 time per day.  Diagnosis: E11.9, Disp: 100 each, Rfl: 1    Cetirizine HCl 10 MG CAPS, Take 1 tablet by mouth daily, Disp: , Rfl:     montelukast (SINGULAIR) 10 MG tablet, Take 10 mg by mouth nightly, Disp: , Rfl:     rOPINIRole (REQUIP) 4 MG tablet, Take 4 mg by mouth nightly , Disp: , Rfl:     albuterol sulfate  (90 BASE) MCG/ACT inhaler, Inhale 2 puffs into the lungs every 6 hours as needed for Wheezing, Disp: , Rfl:       SOCIAL HISTORY     Social History     Social History Narrative    Not on file     Social History     Tobacco Use    Smoking status: Never    Smokeless tobacco: Never   Vaping Use    Vaping Use: Never used   Substance Use Topics    Alcohol use: Yes     Comment: socially    Drug use: No         ALLERGIES     Allergies   Allergen Reactions    Latex Rash    Keflex [Cephalexin] Rash    Zofran [Ondansetron] Hallucinations    Betadine [Povidone Iodine] Itching and Swelling     Iv site - main area from parekh catheter    Bacitracin Rash    Neosporin [Neomycin-Polymyxin-Gramicidin] Rash    Pcn [Penicillins] Hives, Swelling and Rash    Rocephin [Ceftriaxone] Rash         FAMILY HISTORY     Family History   Problem Relation Age of Onset    Thyroid Disease Mother     Depression Mother     Asthma Mother     Arthritis Mother     Cancer Father     Arthritis Father     Diabetes Sister     Arthritis Sister     Cancer Maternal Uncle     Arthritis Paternal Grandfather     Cancer Paternal Grandfather     Arthritis Paternal Grandmother Arthritis Maternal Grandfather     Arthritis Maternal Grandmother     Asthma Maternal Grandmother          PHYSICAL EXAM     ED Triage Vitals [08/12/22 1724]   BP Temp Temp Source Heart Rate Resp SpO2 Height Weight   138/89 98.8 °F (37.1 °C) Oral 80 18 98 % -- 251 lb (113.9 kg)         Additional Vital Signs:  Vitals:    08/12/22 2147   BP: 115/87   Pulse: 62   Resp: 18   Temp:    SpO2: 97%       Physical Exam  Constitutional:       General: She is not in acute distress. Appearance: She is well-developed. She is not diaphoretic. HENT:      Head: Normocephalic and atraumatic. Eyes:      General:         Right eye: No discharge. Left eye: No discharge. Conjunctiva/sclera: Conjunctivae normal.      Pupils: Pupils are equal, round, and reactive to light. Neck:      Thyroid: No thyromegaly. Vascular: No JVD. Cardiovascular:      Rate and Rhythm: Normal rate and regular rhythm. Heart sounds: Normal heart sounds. Pulmonary:      Effort: Pulmonary effort is normal. No respiratory distress. Breath sounds: Normal breath sounds. Abdominal:      General: Bowel sounds are normal. There is no distension. Palpations: Abdomen is soft. Tenderness: There is abdominal tenderness in the periumbilical area and left upper quadrant. There is no guarding or rebound. Comments: Right-sided abdominal incisions are well approximated. Superior incision minor crusting and Steri-Strip present. No surrounding erythema. Musculoskeletal:         General: No tenderness. Normal range of motion. Cervical back: Normal range of motion and neck supple. Lymphadenopathy:      Cervical: No cervical adenopathy. Skin:     General: Skin is warm and dry. Capillary Refill: Capillary refill takes less than 2 seconds. Findings: No rash. Neurological:      Mental Status: She is alert and oriented to person, place, and time. She is not disoriented. GCS: GCS eye subscore is 4.  GCS verbal subscore is 5. GCS motor subscore is 6. Motor: No abnormal muscle tone or seizure activity. Gait: Gait normal.      Comments: Awake and alert. Moves all extremities. Non-focal exam with steady gait. Initial vital signs and nursing assessment reviewed and normal.   Pulsoximetry is normal per my interpretation. MEDICAL DECISION MAKING   Initial Assessment: Given the patient's above chief complaint and findings on history and physical examination, I thought it was appropriate to consider the following emergency medical conditions: Abdominal wall strain, postop complication, obstruction, suture malfunction, intra-abdominal infection, although some of these diagnoses are unlikely they were considered in my medical decision making. Plan: CBC, BMP, lipase, CT scan abdomen pelvis oral contrast symptomatic treatment with morphine, Zofran and reassess         ED RESULTS   Laboratory results:  Labs Reviewed   CBC WITH AUTO DIFFERENTIAL - Abnormal; Notable for the following components:       Result Value    RDW-SD 46.5 (*)     All other components within normal limits   COMPREHENSIVE METABOLIC PANEL W/ REFLEX TO MG FOR LOW K - Abnormal; Notable for the following components:    Glucose 163 (*)     All other components within normal limits   URINE WITH REFLEXED MICRO - Abnormal; Notable for the following components:    Ketones, Urine TRACE (*)     Specific Gravity, Urine > 1.030 (*)     Leukocyte Esterase, Urine TRACE (*)     Color, UA DK YELLOW (*)     All other components within normal limits   LIPASE   C-REACTIVE PROTEIN   ANION GAP   GLOMERULAR FILTRATION RATE, ESTIMATED   OSMOLALITY       Radiologic studies results:  CT ABDOMEN PELVIS W IV CONTRAST Additional Contrast? None   Final Result   Impression:   Normal appendix. No diverticulitis or bowel obstruction. Mild small bowel wall thickening likely incidental. Mild nonspecific    enteritis less likely.       This document has been release tablet Take 1 tablet by mouth in the morning and 1 tablet before bedtime. Do all this for 5 days. , Disp-10 tablet, R-0Normal                  FINAL DISPOSITION     Final diagnoses:   Abdominal pain, unspecified abdominal location   Abdominal wall strain, initial encounter     Condition: condition: good  Dispo: Discharge to home    PATIENT REFERRED TO:  Anam Hodgosn MD  [address]  [place] [address]  [address]  [phone]    Schedule an appointment as soon as possible for a visit in 3 days      Critical Care Time   None      This transcription was electronically signed. Parts of this transcriptions may have been dictated by use of voice recognition software and electronically transcribed, and parts may have been transcribed with the assistance of an ED scribe. The transcription may contain errors not detected in proofreading.     Electronically Signed: Donis Sullivan DO, 08/13/22, 4:39 PM       Donis Sullivan DO  08/13/22 1640

## 2022-08-12 NOTE — ED NOTES
Pt to room 18 from triage in stable condition. Pt and vs assessed. RR easy and unlabored. Dr. Becky Alejandra at bedside assessing pt. No distress noted.  Pt stable at this time     [facility]  08/12/22 2250

## 2022-08-12 NOTE — ED NOTES
Patient to ED 1 month status post gastric sleeve via Dr. Ruth Santos. Patient states that today she has been developing right and left abdominal pain. She states that she was doing house cleaning today and noticed that her pain had been worsening over the course of the day. Denies vomiting or diarrhea and reports normal bowel habits. Patient is resting in chair with easy and unlabored respirations. No distress noted. Patient denies further complaints or concerns.         Carolina Isabel RN  08/12/22 6222

## 2022-08-12 NOTE — ED NOTES
Pt and vs reassessed. RR easy and unlabored. Pt resting in bed alert and medicated per MAR. No distress noted.  Pt stable at this time and denies any other needs     Martha Cano RN  08/12/22 2057

## 2022-08-12 NOTE — ED NOTES
Pt and vs reassessed. RR easy and unlabored. Pt resting in bed alert and denies any needs. No distress noted.  Pt stable at this time     [facility]  08/12/22 1946

## 2022-08-13 NOTE — ED NOTES
Pt and vs reassessed. RR easy and unlabored. Pt ambulated to the bathroom to provide a urine sample and denies any other needs. No distress noted.  Pt stable at this time     Martha Cano, [facility]  08/12/22 2027

## 2022-08-13 NOTE — ED NOTES
Pt and vs reassessed. RR easy and unlabored. Pt resting in bed alert and denies any needs.  Pt stable at this time     [facility]  08/12/22 8987

## 2022-08-13 NOTE — DISCHARGE INSTRUCTIONS
Return to the ED if you have any new or changing symptoms such as abdominal pain, vomiting, fever, blood in your stool, or you have any other concerns.

## 2022-08-14 LAB
EKG ATRIAL RATE: 62 BPM
EKG P AXIS: 10 DEGREES
EKG P-R INTERVAL: 142 MS
EKG Q-T INTERVAL: 404 MS
EKG QRS DURATION: 82 MS
EKG QTC CALCULATION (BAZETT): 410 MS
EKG R AXIS: -22 DEGREES
EKG T AXIS: 11 DEGREES
EKG VENTRICULAR RATE: 62 BPM

## 2022-08-14 PROCEDURE — 93010 ELECTROCARDIOGRAM REPORT: CPT | Performed by: INTERNAL MEDICINE

## 2022-08-16 ENCOUNTER — HOSPITAL ENCOUNTER (OUTPATIENT)
Age: 47
Discharge: HOME | End: 2022-08-16
Payer: COMMERCIAL

## 2022-08-16 DIAGNOSIS — K91.2 POSTSURGICAL MALABSORPTION: ICD-10-CM

## 2022-08-16 DIAGNOSIS — Z90.3 S/P GASTRIC SLEEVE PROCEDURE: ICD-10-CM

## 2022-08-16 DIAGNOSIS — E61.1 LOW IRON: ICD-10-CM

## 2022-08-16 DIAGNOSIS — Z13.21 SCREENING FOR MALNUTRITION: ICD-10-CM

## 2022-08-16 LAB
ALBUMIN SERPL-MCNC: 4.3 G/DL (ref 3.5–5.1)
ALP BLD-CCNC: 74 U/L (ref 38–126)
ALT SERPL-CCNC: 26 U/L (ref 11–66)
ANION GAP SERPL CALCULATED.3IONS-SCNC: 8 MEQ/L (ref 8–16)
AST SERPL-CCNC: 23 U/L (ref 5–40)
BASOPHILS # BLD: 0.8 %
BASOPHILS ABSOLUTE: 0 THOU/MM3 (ref 0–0.1)
BILIRUB SERPL-MCNC: 0.6 MG/DL (ref 0.3–1.2)
BUN BLDV-MCNC: 16 MG/DL (ref 7–22)
CALCIUM SERPL-MCNC: 9.5 MG/DL (ref 8.5–10.5)
CHLORIDE BLD-SCNC: 106 MEQ/L (ref 98–111)
CO2: 25 MEQ/L (ref 23–33)
CREAT SERPL-MCNC: 0.6 MG/DL (ref 0.4–1.2)
EOSINOPHIL # BLD: 5.4 %
EOSINOPHILS ABSOLUTE: 0.3 THOU/MM3 (ref 0–0.4)
ERYTHROCYTE [DISTWIDTH] IN BLOOD BY AUTOMATED COUNT: 14.5 % (ref 11.5–14.5)
ERYTHROCYTE [DISTWIDTH] IN BLOOD BY AUTOMATED COUNT: 46.3 FL (ref 35–45)
FERRITIN: 56 NG/ML (ref 10–291)
GFR SERPL CREATININE-BSD FRML MDRD: > 90 ML/MIN/1.73M2
GLUCOSE BLD-MCNC: 170 MG/DL (ref 70–108)
HCT VFR BLD CALC: 40.9 % (ref 37–47)
HEMOGLOBIN: 13.3 GM/DL (ref 12–16)
IMMATURE GRANS (ABS): 0.01 THOU/MM3 (ref 0–0.07)
IMMATURE GRANULOCYTES: 0.2 %
IRON: 62 UG/DL (ref 50–170)
LYMPHOCYTES # BLD: 21.5 %
LYMPHOCYTES ABSOLUTE: 1.3 THOU/MM3 (ref 1–4.8)
MCH RBC QN AUTO: 28.5 PG (ref 26–33)
MCHC RBC AUTO-ENTMCNC: 32.5 GM/DL (ref 32.2–35.5)
MCV RBC AUTO: 87.6 FL (ref 81–99)
MONOCYTES # BLD: 8.1 %
MONOCYTES ABSOLUTE: 0.5 THOU/MM3 (ref 0.4–1.3)
NUCLEATED RED BLOOD CELLS: 0 /100 WBC
PLATELET # BLD: 184 THOU/MM3 (ref 130–400)
PMV BLD AUTO: 11.7 FL (ref 9.4–12.4)
POTASSIUM SERPL-SCNC: 3.8 MEQ/L (ref 3.5–5.2)
PREALBUMIN: 16.9 MG/DL (ref 20–40)
RBC # BLD: 4.67 MILL/MM3 (ref 4.2–5.4)
SEG NEUTROPHILS: 64 %
SEGMENTED NEUTROPHILS ABSOLUTE COUNT: 3.8 THOU/MM3 (ref 1.8–7.7)
SODIUM BLD-SCNC: 139 MEQ/L (ref 135–145)
TOTAL IRON BINDING CAPACITY: 276 UG/DL (ref 171–450)
TOTAL PROTEIN: 7.4 G/DL (ref 6.1–8)
WBC # BLD: 5.9 THOU/MM3 (ref 4.8–10.8)

## 2022-08-16 PROCEDURE — 80053 COMPREHEN METABOLIC PANEL: CPT

## 2022-08-16 PROCEDURE — 83550 IRON BINDING TEST: CPT

## 2022-08-16 PROCEDURE — 83540 ASSAY OF IRON: CPT

## 2022-08-16 PROCEDURE — 82728 ASSAY OF FERRITIN: CPT

## 2022-08-16 PROCEDURE — 84134 ASSAY OF PREALBUMIN: CPT

## 2022-08-16 PROCEDURE — 85025 COMPLETE CBC W/AUTO DIFF WBC: CPT

## 2022-08-16 PROCEDURE — 36415 COLL VENOUS BLD VENIPUNCTURE: CPT

## 2022-08-22 NOTE — PROGRESS NOTES
Patient is a 49 y.o. female seen for follow up MNT visit for six week post op. Vitals from current and previous visits:  Vitals 6/38/5912   SYSTOLIC 752   DIASTOLIC 70   Site Right Upper Arm   Position Sitting   Cuff Size Large Adult   Pulse 104   Temp 97.3   Resp    SpO2    Weight 245 lb 3.2 oz   Height 5' 6\"   Body mass index 39.57 kg/m2   Pain Level         Recent Post Op Lab Work:   Recent labs - Glucose-187, Iron WNL  Lab Results   Component Value Date/Time    VITD25 32 05/25/2022 02:51 PM       Date of Surgery: 7/11/22  Type of Surgery: gastric sleeve      Initial Weight: 273 lbs at pre-op teaching class   Excess  Body Weight Pre-Op: 127  lbs     Weight Loss: 28 lbs. Patient's Target Weight =  146 lbs for a BMI of ~25  Percentage of Excess Body Weight Lost to date is :  22 %      How pleased are you with your current weight loss: Pt is pleased with weight loss    Are you experiencing any physical problems post surgery: No- bowels are moving without difficulty    Are you experiencing any dietary problems post surgery: No:- states  Ate too much jello at once and had discomfort  Food Intolerances: Is not tolerating  mashed potatoes- set heavy on stomach. Texture of cottage cheese bothering pt    How frequently are you eating? Tries to eat three times a day- Breakfast- 9am, Lunch -1 p- and dinner -6pm  How long does it take you to finish a meal? Taking time to eat- learning to slow down  How full do you feel after eating? Pt feel full after eats    Having jello, sugar pudding, white chicken chili. Has had eggs, applesauce, parfaits    Protein intake: 60-80 grams/day   Patient taking protein supplement: Yes.   Brand of Supplement: Premier Protein Shakes two per day, [employer] Equate Brand Vanilla flavor BID     Fluid intake: Denies any difficulty with fluid intake > 64 oz of water a day      Multivitamin/mineral intake: Celebrate One 45 chewable tropical twist- reviewed switching to capsule form of same MVI and meets ASMBS guidelines     Calcium intake: Yes. 500 mg calcium soft chew two per day  Reviewed switching to Viactiv  calcium soft chew      Other: B12 and Vitamin C daily- advised pt can stop both of these if tolerates Bariatric capsule MVI daily  Assessment  Pt tolerating stage appropriate foods without difficulty. Supplementing with protein shakes and taking vitamins      Plan  Plan/Recommendations: Pt educated on six week post op nutrition guidelines. Questions answered. See additional instructions below. -  Patient Instructions   1. Continue bariatric vitamins as you are currently taking. Ok to switch to [place] One 45 capsule Multivitamin take one a day with food. Ok to switch Calcium to Viactiv soft chew with food per day. Can stop extra B12 and Vitamin C if tolerate Multivitamin. 2. Goal remains  60-80 grams protein per day. Focus on choosing protein foods first at meals. Suggest continue one-two protein shakes daily to meet this recommendation. 3. Increase physical activity to include cardiac and strength training now that you no longer have weight restrictions  4. Water goal is 64 oz per day and make sure no liquids 30 minutes before meals and no liquids 30 minutes after meals  5. Take 20-30 minutes to eat meals and chew all foods well for best tolerance especially as you introduce new foods. get lab work done 5-7 days before next office visit.        Recommended Follow-Up: three month post op with PA and JIMENEZ Amos RD, LD,   Dietitian- Weight Management [address] [place] [address]

## 2022-08-23 ENCOUNTER — OFFICE VISIT (OUTPATIENT)
Dept: BARIATRICS/WEIGHT MGMT | Age: 47
End: 2022-08-23

## 2022-08-23 VITALS
HEIGHT: 66 IN | SYSTOLIC BLOOD PRESSURE: 120 MMHG | WEIGHT: 245.2 LBS | BODY MASS INDEX: 39.41 KG/M2 | HEART RATE: 80 BPM | DIASTOLIC BLOOD PRESSURE: 70 MMHG | TEMPERATURE: 97.3 F

## 2022-08-23 DIAGNOSIS — G47.33 OSA ON CPAP: ICD-10-CM

## 2022-08-23 DIAGNOSIS — K91.2 POSTSURGICAL MALABSORPTION: ICD-10-CM

## 2022-08-23 DIAGNOSIS — E11.9 TYPE 2 DIABETES MELLITUS WITHOUT COMPLICATION, WITHOUT LONG-TERM CURRENT USE OF INSULIN (HCC): ICD-10-CM

## 2022-08-23 DIAGNOSIS — Z99.89 OSA ON CPAP: ICD-10-CM

## 2022-08-23 DIAGNOSIS — Z98.84 STATUS POST BARIATRIC SURGERY: Primary | ICD-10-CM

## 2022-08-23 DIAGNOSIS — F32.A DEPRESSION, UNSPECIFIED DEPRESSION TYPE: ICD-10-CM

## 2022-08-23 DIAGNOSIS — Z13.21 SCREENING FOR MALNUTRITION: ICD-10-CM

## 2022-08-23 DIAGNOSIS — F41.9 ANXIETY: ICD-10-CM

## 2022-08-23 DIAGNOSIS — Z90.3 S/P GASTRIC SLEEVE PROCEDURE: Primary | ICD-10-CM

## 2022-08-23 DIAGNOSIS — E78.5 HYPERLIPIDEMIA, UNSPECIFIED HYPERLIPIDEMIA TYPE: ICD-10-CM

## 2022-08-23 PROCEDURE — 99024 POSTOP FOLLOW-UP VISIT: CPT | Performed by: PHYSICIAN ASSISTANT

## 2022-08-23 RX ORDER — MULTIVIT WITH MINERALS/LUTEIN
1000 TABLET ORAL DAILY
COMMUNITY
End: 2022-10-17

## 2022-08-23 NOTE — PATIENT INSTRUCTIONS
Stay well hydrated. Drink a minimum of 64 oz of non-carbonated, non-caffeinated fluids daily. Nutritional education occurred during visit. Tolerating diet. Continue following with dietitian and follow their recommendations as directed. Continue  60-80  grams of protein each day. Signs and symptoms reviewed with patient that would be concerning and need her to return to office for re-evaluation. Patient will call if any questions or concerns arrise. Off all restrictions  Importance of physical activity discussed with patient. Increase physical activity as tolerated  Add strength training  Continue taking Multivitamin, Calcium and B12 as directed  Continue Omeprazole for at least 3 months post-op- May swallow capsule without opening  Encouraged to attend support groups  Progress diet as tolerated per dietitian recommendations. 6 week labs reviewed with patient today  12 week labs ordered- to be drawn one week prior to next apt.   Follow up in 6 weeks with provider and dietitian   Wear CPAP - adjust pressure with pulmonary as needed  Continue to monitor blood sugars - adjust medication with family doctor as needed

## 2022-08-23 NOTE — PROGRESS NOTES
[facility]  [address]  [address] [facility]      Visit Date:  2022  Weight Management Postop Follow-up    HPI:      Anai Corcoran is a 49 y.o. female who is here today for 6 weeks follow up since  laparoscopic sleeve gastrectomy performed by Dr. Marta Chase  on 22. Doing well. Feeling good. Weight today 245#. Down 10# since last visit. Down 24# since surgery. BMI 39 . Drinking 64 oz of fluid and 75-85 grams of protein daily. Drinking 2 protein shakes daily. No carbonation. No sweets. Tolerating post-op diet. No problems with bowel movements. Continues stool softener once daily. Nausea/vomiting x 1 since last apt after eating too much. No GERD/ Reflux-continues to take Omeprazole. Denies CP/SOB. No Dizziness. No abdominal pain. No incisional discomfort. No sx of dehydration. No fever or chills. Taking and tolerating all vitamins-Calcium 2 daily/ [id number]/ B12. Labs reviewed. Iron studies all within normal range. Checking sugars and running 160-170. Has apt with PCP to discuss resuming diabetic medication. Wearing CPAP Qhs. Physical Activity: Walking. Resumed Door dash and instacart. Encouraged to make apt with Etelvina. Current BMI: Body mass index is 39.58 kg/m².   Current Weight:   Wt Readings from Last 3 Encounters:   22 245 lb 3.2 oz (111.2 kg)   22 251 lb (113.9 kg)   22 251 lb 9.6 oz (114.1 kg)     Initial weight: 281  Pre-op Body HKPTFT:230      Past Medical History:  Past Medical History:   Diagnosis Date    Allergic rhinitis     Anxiety     Asthma     Depression     Diabetes ([address]     Hyperlipidemia     Psychiatric problem     depression    Sleep apnea        Past Surgical History:  Past Surgical History:   Procedure Laterality Date    CARPAL TUNNEL RELEASE       SECTION      HYSTERECTOMY (CERVIX STATUS UNKNOWN)      KNEE ARTHROSCOPY      SLEEVE GASTRECTOMY N/A 2022    Robotic Sleeve Gastrectomy performed by Jessica Wolf MD at [facility]       Past Social History:  Social History     Socioeconomic History    Marital status:      Spouse name: Not on file    Number of children: 3    Years of education: Not on file    Highest education level: Not on file   Occupational History    Not on file   Tobacco Use    Smoking status: Never    Smokeless tobacco: Never   Vaping Use    Vaping Use: Never used   Substance and Sexual Activity    Alcohol use: Yes     Comment: socially    Drug use: No    Sexual activity: Yes   Other Topics Concern    Not on file   Social History Narrative    Not on file     Social Determinants of Health     Financial Resource Strain: Low Risk     Difficulty of Paying Living Expenses: Not very hard   Food Insecurity: Food Insecurity Present    Worried About Running Out of Food in the Last Year: Sometimes true    Ran Out of Food in the Last Year: Sometimes true   Transportation Needs: No Transportation Needs    Lack of Transportation (Medical): No    Lack of Transportation (Non-Medical): No   Physical Activity: Sufficiently Active    Days of Exercise per Week: 5 days    Minutes of Exercise per Session: 30 min   Stress: Stress Concern Present    Feeling of Stress : Rather much   Social Connections: Moderately Integrated    Frequency of Communication with Friends and Family: More than three times a week    Frequency of Social Gatherings with Friends and Family:  Three times a week    Attends [employer] Services: More than 4 times per year    Active Member of Clubs or Organizations: No    Attends Club or Organization Meetings: Patient refused    Marital Status:    Intimate Partner Violence: Not At Risk    Fear of Current or Ex-Partner: No    Emotionally Abused: No    Physically Abused: No    Sexually Abused: No   Housing Stability: Low Risk     Unable to Pay for Housing in the Last Year: No    Number of [place] in the Last Year: 1    Unstable Housing in the Last Year: No        Medications:   Current Outpatient Medications   Medication Sig Dispense Refill    vitamin E 1000 units capsule Take 1,000 Units by mouth daily      calcium citrate (CALCITRATE) 950 (200 Ca) MG tablet Take 1 tablet by mouth in the morning. vitamin B-12 (CYANOCOBALAMIN) 500 MCG tablet Take 500 mcg by mouth in the morning. Docusate Sodium 100 MG TABS Take 100 mg by mouth 2 times daily Take as needed to prevent constipation 30 tablet 1    Multiple Vitamins-Minerals (THERAPEUTIC MULTIVITAMIN-MINERALS) tablet Take 1 tablet by mouth daily      CPAP Machine MISC by Does not apply route Please check patient's CPAP machine for proper functioning by [employer]. She was prescribed with a CPAP with a pressure of 11 cm of water with room air. 1 each 0    traZODone (DESYREL) 150 MG tablet Take 150 mg by mouth nightly      omeprazole (PRILOSEC) 40 MG delayed release capsule Take 1 capsule by mouth daily      oxybutynin (DITROPAN-XL) 5 MG extended release tablet Take 1 tablet by mouth daily       atorvastatin (LIPITOR) 40 MG tablet TAKE ONE TABLET BY MOUTH DAILY 30 tablet 3    Blood Glucose Monitoring Suppl (ACCU-CHEK ROD PLUS) w/Device KIT Check blood sugar once daily 1 kit 0    glucose blood VI test strips (ACCU-CHEK ROD PLUS) strip Check blood sugar once daily 100 each 3    Lancets MISC Use to test blood glucose level 1 time per day. Diagnosis: E11.9 100 each 1    Cetirizine HCl 10 MG CAPS Take 1 tablet by mouth daily      montelukast (SINGULAIR) 10 MG tablet Take 10 mg by mouth nightly      rOPINIRole (REQUIP) 4 MG tablet Take 4 mg by mouth nightly       albuterol sulfate  (90 BASE) MCG/ACT inhaler Inhale 2 puffs into the lungs every 6 hours as needed for Wheezing       No current facility-administered medications for this visit. Allergies:    Allergies   Allergen Reactions    Latex Rash    Keflex [Cephalexin] Rash    Zofran [Ondansetron] Hallucinations    Betadine [Povidone Iodine] Itching and Swelling     Iv site - main area from parekh catheter    Bacitracin Rash    Neosporin [Neomycin-Polymyxin-Gramicidin] Rash    Pcn [Penicillins] Hives, Swelling and Rash    Rocephin [Ceftriaxone] Rash       Subjective:    Review of Systems:  Constitutional: Denies any fever, chills, fatigue. Wound: Denies any rash, skin color changes or wound problems. Resp: Denies any cough, shortness of breath. CV: Denies any chest pain, orthopnea or syncope. MS: Denies myalgias, (+) arthralgias. GI: Denies any nausea, vomiting, diarrhea, constipation, melena, hematochezia. No incisional discomfort. : Denies any hematuria, hesitancy or dysuria. NEURO: Denies seizures, headache. Objective:    /70 (Site: Right Upper Arm, Position: Sitting, Cuff Size: Large Adult)   Pulse (!) 104   Temp 97.3 °F (36.3 °C) (Infrared)   Ht 5' 6\" (1.676 m)   Wt 245 lb 3.2 oz (111.2 kg)   BMI 39.58 kg/m²     Physical Examination:   Constitutional: Alert and oriented to person, place and time. Well-developed, well- nourished. Head: Normocephalic and atraumatic  Neck: Supple. Eyes: EOMI b/l. Conjunctivae normal.  No scleral icterus. Respiratory: Effort normal. No respiratory distress. Abd: Incisions well healed non-tender. No sign of infection. Ext:  Movement x 4. No edema  Skin; Warm and dry, no visible rashes, lesions or ulcers.    Neuro: Cranial Nerves Grossly Intact; nml coordination        Labs:  CBC   Lab Results   Component Value Date/Time    WBC 5.9 08/16/2022 12:13 PM    RBC 4.67 08/16/2022 12:13 PM    HGB 13.3 08/16/2022 12:13 PM    HCT 40.9 08/16/2022 12:13 PM    MCV 87.6 08/16/2022 12:13 PM    MCH 28.5 08/16/2022 12:13 PM    MCHC 32.5 08/16/2022 12:13 PM    RDW 15.0 07/27/2017 06:33 AM     08/16/2022 12:13 PM    MPV 11.7 08/16/2022 12:13 PM    RBCMORP NORMAL 07/27/2017 06:33 AM    SEGSPCT 64.0 08/16/2022 12:13 PM    LABLYMP 21.5 08/16/2022 12:13 PM    MONOPCT 8.1 08/16/2022 12:13 PM LABEOS 5.4 08/16/2022 12:13 PM    BASO 0.8 08/16/2022 12:13 PM    NRBC 0 08/16/2022 12:13 PM    ANISOCYTOSIS 1+ 07/27/2017 06:33 AM    SEGSABS 3.8 08/16/2022 12:13 PM    LYMPHSABS 1.3 08/16/2022 12:13 PM    MONOSABS 0.5 08/16/2022 12:13 PM    EOSABS 0.3 08/16/2022 12:13 PM    BASOSABS 0.0 08/16/2022 12:13 PM        BMP/CMP   Lab Results   Component Value Date/Time    GLUCOSE 170 08/16/2022 12:13 PM    CREATININE 0.6 08/16/2022 12:13 PM    BUN 16 08/16/2022 12:13 PM     08/16/2022 12:13 PM    K 3.8 08/16/2022 12:13 PM    K 3.8 08/12/2022 06:06 PM     08/16/2022 12:13 PM    CO2 25 08/16/2022 12:13 PM    CALCIUM 9.5 08/16/2022 12:13 PM    AST 23 08/16/2022 12:13 PM    ALKPHOS 74 08/16/2022 12:13 PM    PROT 7.4 08/16/2022 12:13 PM    LABALBU 4.3 08/16/2022 12:13 PM    BILITOT 0.6 08/16/2022 12:13 PM    ALT 26 08/16/2022 12:13 PM        PREALBUMIN   Lab Results   Component Value Date/Time    PREALBUMIN 16.9 08/16/2022 12:13 PM        VITAMIN B12   Lab Results   Component Value Date/Time    CEOIBNCN16 784 05/25/2022 02:51 PM        24 HOUR URINE CALCIUM   No results found for: URVOLMEAS, HOURSC, CALCIUMUR     VITAMIN D   Lab Results   Component Value Date/Time    VITD25 32 05/25/2022 02:51 PM        VITAMIN B1/ THIAMINE   Lab Results   Component Value Date/Time    URUB8YMWWPW 123 05/25/2022 02:51 PM        RBC FOLATE   Lab Results   Component Value Date/Time    FOLATE > 20.0 05/25/2022 02:51 PM        LIPID SCREEN (FASTING)   Lab Results   Component Value Date/Time    CHOL 122 05/25/2022 02:51 PM    TRIG 75 05/25/2022 02:51 PM    HDL 65 05/25/2022 02:51 PM    LDLCALC 42 05/25/2022 02:51 PM   ,     HGA1C (T2DM ONLY)   Lab Results   Component Value Date/Time    LABA1C 7.1 05/25/2022 02:51 PM    AVGG 153 05/25/2022 02:51 PM        TSH   Lab Results   Component Value Date/Time    TSH 0.770 05/25/2022 02:51 PM        IRON   Lab Results   Component Value Date/Time    IRON 62 08/16/2022 12:13 PM        IONIZED CALCIUM   No results found for: RAFIA EVANS      Assessment:       Diagnosis Orders   1. S/P gastric sleeve procedure  CBC with Auto Differential    Comprehensive Metabolic Panel    Prealbumin    Vitamin B1    Vitamin D 25 Hydroxy      2. BMI 39.0-39.9,adult  CBC with Auto Differential    Comprehensive Metabolic Panel    Prealbumin    Vitamin B1    Vitamin D 25 Hydroxy      3. Postsurgical malabsorption  CBC with Auto Differential    Comprehensive Metabolic Panel    Prealbumin    Vitamin B1    Vitamin D 25 Hydroxy      4. Screening for malnutrition  CBC with Auto Differential    Comprehensive Metabolic Panel    Prealbumin    Vitamin B1    Vitamin D 25 Hydroxy      5. Anxiety        6. Depression, unspecified depression type        7. Type 2 diabetes mellitus without complication, without long-term current use of insulin ([facility] [address]        8. DARIEN on CPAP        9. Hyperlipidemia, unspecified hyperlipidemia type              Plan:    Stay well hydrated. Drink a minimum of 64 oz of non-carbonated, non-caffeinated fluids daily. Nutritional education occurred during visit. Tolerating diet. Continue following with dietitian and follow their recommendations as directed. Continue  60-80  grams of protein each day. Signs and symptoms reviewed with patient that would be concerning and need her to return to office for re-evaluation. Patient will call if any questions or concerns arrise. Off all restrictions  Importance of physical activity discussed with patient. Increase physical activity as tolerated  Add strength training  Continue taking Multivitamin, Calcium and B12 as directed  Continue Omeprazole for at least 3 months post-op- May swallow capsule without opening  Encouraged to attend support groups  Progress diet as tolerated per dietitian recommendations. 6 week labs reviewed with patient today  12 week labs ordered- to be drawn one week prior to next apt.   Follow up in 6 weeks with provider and dietitian   Wear CPAP - adjust pressure with pulmonary as needed  Continue to monitor blood sugars - adjust medication with family doctor as needed  Return in about 6 weeks (around 10/4/2022) for postop follow up. I spent over 20 minutes with the patient, with greater that 50% of that time spent on face counseling for nutrition and exercise.     Electronically signed by DNAIELITO Fontenot on 8/23/2022 at 11:35 AM

## 2022-08-23 NOTE — PATIENT INSTRUCTIONS
1. Continue bariatric vitamins as you are currently taking. Ok to switch to [place] One 45 capsule Multivitamin take one a day with food. Ok to switch Calcium to Viactiv soft chew with food per day. Can stop extra B12 and Vitamin C if tolerate Multivitamin. 2. Goal remains  60-80 grams protein per day. Focus on choosing protein foods first at meals. Suggest continue one-two protein shakes daily to meet this recommendation. 3. Increase physical activity to include cardiac and strength training now that you no longer have weight restrictions  4. Water goal is 64 oz per day and make sure no liquids 30 minutes before meals and no liquids 30 minutes after meals  5. Take 20-30 minutes to eat meals and chew all foods well for best tolerance especially as you introduce new foods. get lab work done 5-7 days before next office visit.

## 2022-09-02 ENCOUNTER — OFFICE VISIT (OUTPATIENT)
Dept: PULMONOLOGY | Age: 47
End: 2022-09-02
Payer: COMMERCIAL

## 2022-09-02 VITALS
WEIGHT: 242 LBS | DIASTOLIC BLOOD PRESSURE: 82 MMHG | HEIGHT: 66 IN | TEMPERATURE: 97.1 F | BODY MASS INDEX: 38.89 KG/M2 | OXYGEN SATURATION: 98 % | SYSTOLIC BLOOD PRESSURE: 124 MMHG | HEART RATE: 89 BPM

## 2022-09-02 DIAGNOSIS — G47.33 OSA ON CPAP: Primary | ICD-10-CM

## 2022-09-02 DIAGNOSIS — E66.9 OBESITY (BMI 30-39.9): ICD-10-CM

## 2022-09-02 DIAGNOSIS — Z99.89 OSA ON CPAP: Primary | ICD-10-CM

## 2022-09-02 DIAGNOSIS — G25.81 RLS (RESTLESS LEGS SYNDROME): ICD-10-CM

## 2022-09-02 PROCEDURE — G8417 CALC BMI ABV UP PARAM F/U: HCPCS | Performed by: NURSE PRACTITIONER

## 2022-09-02 PROCEDURE — 99214 OFFICE O/P EST MOD 30 MIN: CPT | Performed by: NURSE PRACTITIONER

## 2022-09-02 PROCEDURE — G8427 DOCREV CUR MEDS BY ELIG CLIN: HCPCS | Performed by: NURSE PRACTITIONER

## 2022-09-02 PROCEDURE — 1036F TOBACCO NON-USER: CPT | Performed by: NURSE PRACTITIONER

## 2022-09-02 RX ORDER — PAROXETINE 10 MG/1
10 TABLET, FILM COATED ORAL EVERY MORNING
COMMUNITY
End: 2022-10-17

## 2022-09-02 ASSESSMENT — ENCOUNTER SYMPTOMS
COUGH: 0
ALLERGIC/IMMUNOLOGIC NEGATIVE: 1
GASTROINTESTINAL NEGATIVE: 1
RESPIRATORY NEGATIVE: 1
EYES NEGATIVE: 1
SHORTNESS OF BREATH: 0
WHEEZING: 0

## 2022-09-02 NOTE — PROGRESS NOTES
Patient reports PAP pressure is: 11 likes pressure    hasn't been wearing cpap due to she sleeps on the couch and she just had bariatric surgery July 11 @ [facility]     Patient reports yes air leaks not a lot of leaks just when she first lays down and puts it on.   MASK: FFM     Patient is falling asleep with difficulty: no    Patient is having difficulty remaining asleep: no    Patient needs supplies: yes filters     Patient is having daytime sleepiness: yes: sometimes     Patient is snoring: No    Other: no cconerns

## 2022-09-02 NOTE — PROGRESS NOTES
[facility]      Salena Goodpasture         [id number]  9/2/2022   Chief Complaint   Patient presents with    Follow-up     DARIEN 6 month f/u w DL         Pt of Dr. Sarabjit Martinez    PAP Download:   Original or initial AHI: 45.1     Date of initial study: 10/20/16      Compliant  50%     Noncompliant 0 %     PAP Type Airsense 11 autoset Level  11   Avg Hrs/Day 8 hrs 19 mins   AHI: 1.4   Recorded compliance dates , 8/3/22-9/1/22  Machine/Mfg:   [x] ResMed    [] Respironics/Dreamstation   Interface:   [] Nasal    [] Nasal pillows   [] FFM      Provider:      [x] [facility]     []Robel     [] [employer]co    [] Cathalene Pap    [] [place]               [] [facility]      [] Adaptive    [] [address]      [] Other    Neck Size: 16  Mallampati Mallampati 3  ESS:  6  SAQLI: 77    Here is a scan of the most recent download:                Presentation:   Emma Baker presents for sleep medicine follow up for obstructive sleep apnea  Down 34# since surgery   RLS- Requip prescribed per other provider and Trazodone    Patient reports PAP pressure is: 11 likes pressure    hasn't been wearing cpap due to she sleeps on the couch and she just had bariatric surgery July 11 @ [facility]      Patient reports yes air leaks not a lot of leaks just when she first lays down and puts it on. MASK: FFM      Patient is falling asleep with difficulty: no     Patient is having difficulty remaining asleep: no     Patient needs supplies: yes filters      Patient is having daytime sleepiness: yes: sometimes      Patient is snoring: No     Other: no cconerns      Equipment issues: The pressure is  acceptable, the mask is acceptable     Sleep issues:  Do you feel better? Yes  More rested? Yes   Better concentration? yes    Progress History:   Since last visit any new medical issues? No  New ER or hospital visits? Yes Gastric sleeve surgery done 7/11/22 down 34#  Any new or changes in medicines? No  Any new sleep medicines?  Yes Trazodone nightly     Review of Systems -   Review of Systems   Constitutional: Negative. Negative for chills and fever. HENT: Negative. Negative for congestion. Eyes: Negative. Respiratory: Negative. Negative for cough, shortness of breath and wheezing. Cardiovascular: Negative. Negative for chest pain and leg swelling. Gastrointestinal: Negative. Endocrine: Negative. Genitourinary: Negative. Musculoskeletal: Negative. Allergic/Immunologic: Negative. Neurological: Negative. Hematological: Negative. Psychiatric/Behavioral: Negative. Negative for sleep disturbance. Physical Exam:    BMI:  Body mass index is 39.06 kg/m². Wt Readings from Last 3 Encounters:   09/02/22 242 lb (109.8 kg)   08/23/22 245 lb 3.2 oz (111.2 kg)   08/12/22 251 lb (113.9 kg)     Weight stable / unchanged  Vitals: /82 (Site: Left Lower Arm, Position: Sitting, Cuff Size: Medium Adult)   Pulse 89   Temp 97.1 °F (36.2 °C) (Temporal)   Ht 5' 6\" (1.676 m)   Wt 242 lb (109.8 kg)   SpO2 98% Comment: on RA  BMI 39.06 kg/m²       Physical Exam  Vitals and nursing note reviewed. Constitutional:       Appearance: She is well-developed. She is obese. HENT:      Head: Normocephalic and atraumatic. Eyes:      Conjunctiva/sclera: Conjunctivae normal.      Pupils: Pupils are equal, round, and reactive to light. Neck:      Vascular: No JVD. Cardiovascular:      Rate and Rhythm: Normal rate and regular rhythm. Heart sounds: Normal heart sounds. No murmur heard. No friction rub. No gallop. Pulmonary:      Effort: Pulmonary effort is normal. No respiratory distress. Breath sounds: Normal breath sounds. No wheezing or rales. Abdominal:      General: Bowel sounds are normal.      Palpations: Abdomen is soft. Musculoskeletal:         General: Normal range of motion. Cervical back: Normal range of motion and neck supple. Skin:     General: Skin is warm and dry. Capillary Refill: Capillary refill takes less than 2 seconds. Neurological:      Mental Status: She is alert and oriented to person, place, and time. Psychiatric:         Behavior: Behavior normal.         Thought Content: Thought content normal.         Judgment: Judgment normal.         ASSESSMENT/DIAGNOSIS     Diagnosis Orders   1. DARIEN on CPAP  DME Order for CPAP as OP      2. RLS (restless legs syndrome)        3. Obesity (BMI 30-39. 9)                 Plan   Do you need any equipment today? No  - Download reviewed and discussed with patient  - She  was advised to continue current positive airway pressure therapy with above described pressure. - She  advised to keep good compliance with current recommended pressure to get optimal results and clinical improvement  - Recommend 7-9 hours of sleep with PAP  - She was advised to call DME company regarding supplies if needed.   -She call my office for earlier appointment if needed for worsening of sleep symptoms.   - She was instructed on weight loss  - Carrol Diamond was educated about my impression and plan.  Patient verbalizesunderstanding.  - Order placed for new mask/refit patient to check with DME about this     We will see Tatiana Maldonado back in: 6 months with download    Information added by my medical assistant/LPN was reviewed today   Electronically signed by KATHERIN Ingram CNP on 9/2/2022 at 9:53 AM

## 2022-09-29 RX ORDER — DOCUSATE SODIUM 100 MG/1
CAPSULE, LIQUID FILLED ORAL
Qty: 30 CAPSULE | Refills: 1 | Status: SHIPPED | OUTPATIENT
Start: 2022-09-29

## 2022-10-14 ENCOUNTER — HOSPITAL ENCOUNTER (OUTPATIENT)
Age: 47
Discharge: HOME | End: 2022-10-14
Payer: COMMERCIAL

## 2022-10-14 DIAGNOSIS — K91.2 POSTSURGICAL MALABSORPTION: ICD-10-CM

## 2022-10-14 DIAGNOSIS — Z90.3 S/P GASTRIC SLEEVE PROCEDURE: ICD-10-CM

## 2022-10-14 DIAGNOSIS — Z13.21 SCREENING FOR MALNUTRITION: ICD-10-CM

## 2022-10-14 LAB
ALBUMIN SERPL-MCNC: 4 G/DL (ref 3.5–5.1)
ALP BLD-CCNC: 95 U/L (ref 38–126)
ALT SERPL-CCNC: 29 U/L (ref 11–66)
ANION GAP SERPL CALCULATED.3IONS-SCNC: 14 MEQ/L (ref 8–16)
AST SERPL-CCNC: 26 U/L (ref 5–40)
BASOPHILS # BLD: 0.7 %
BASOPHILS ABSOLUTE: 0 THOU/MM3 (ref 0–0.1)
BILIRUB SERPL-MCNC: 0.7 MG/DL (ref 0.3–1.2)
BUN BLDV-MCNC: 10 MG/DL (ref 7–22)
CALCIUM SERPL-MCNC: 9.3 MG/DL (ref 8.5–10.5)
CHLORIDE BLD-SCNC: 102 MEQ/L (ref 98–111)
CO2: 23 MEQ/L (ref 23–33)
CREAT SERPL-MCNC: 0.5 MG/DL (ref 0.4–1.2)
EOSINOPHIL # BLD: 4.2 %
EOSINOPHILS ABSOLUTE: 0.2 THOU/MM3 (ref 0–0.4)
ERYTHROCYTE [DISTWIDTH] IN BLOOD BY AUTOMATED COUNT: 14.5 % (ref 11.5–14.5)
ERYTHROCYTE [DISTWIDTH] IN BLOOD BY AUTOMATED COUNT: 44.3 FL (ref 35–45)
GFR SERPL CREATININE-BSD FRML MDRD: > 90 ML/MIN/1.73M2
GLUCOSE BLD-MCNC: 172 MG/DL (ref 70–108)
HCT VFR BLD CALC: 37.7 % (ref 37–47)
HEMOGLOBIN: 13 GM/DL (ref 12–16)
IMMATURE GRANS (ABS): 0.03 THOU/MM3 (ref 0–0.07)
IMMATURE GRANULOCYTES: 0.5 %
LYMPHOCYTES # BLD: 24.9 %
LYMPHOCYTES ABSOLUTE: 1.4 THOU/MM3 (ref 1–4.8)
MCH RBC QN AUTO: 29.3 PG (ref 26–33)
MCHC RBC AUTO-ENTMCNC: 34.5 GM/DL (ref 32.2–35.5)
MCV RBC AUTO: 84.9 FL (ref 81–99)
MONOCYTES # BLD: 7.4 %
MONOCYTES ABSOLUTE: 0.4 THOU/MM3 (ref 0.4–1.3)
NUCLEATED RED BLOOD CELLS: 0 /100 WBC
PLATELET # BLD: 213 THOU/MM3 (ref 130–400)
PMV BLD AUTO: 11.7 FL (ref 9.4–12.4)
POTASSIUM SERPL-SCNC: 3.9 MEQ/L (ref 3.5–5.2)
PREALBUMIN: 17.3 MG/DL (ref 20–40)
RBC # BLD: 4.44 MILL/MM3 (ref 4.2–5.4)
SEG NEUTROPHILS: 62.3 %
SEGMENTED NEUTROPHILS ABSOLUTE COUNT: 3.6 THOU/MM3 (ref 1.8–7.7)
SODIUM BLD-SCNC: 139 MEQ/L (ref 135–145)
TOTAL PROTEIN: 6.9 G/DL (ref 6.1–8)
VITAMIN D 25-HYDROXY: 42 NG/ML (ref 30–100)
WBC # BLD: 5.7 THOU/MM3 (ref 4.8–10.8)

## 2022-10-14 PROCEDURE — 80053 COMPREHEN METABOLIC PANEL: CPT

## 2022-10-14 PROCEDURE — 84134 ASSAY OF PREALBUMIN: CPT

## 2022-10-14 PROCEDURE — 82306 VITAMIN D 25 HYDROXY: CPT

## 2022-10-14 PROCEDURE — 85025 COMPLETE CBC W/AUTO DIFF WBC: CPT

## 2022-10-14 PROCEDURE — 84425 ASSAY OF VITAMIN B-1: CPT

## 2022-10-14 PROCEDURE — 36415 COLL VENOUS BLD VENIPUNCTURE: CPT

## 2022-10-14 NOTE — PROGRESS NOTES
Patient is a 49 y.o. female seen for follow up MNT visit for three month post op. Vitals from current and previous visits:     Vitals 40/68/0341   SYSTOLIC    DIASTOLIC    Site    Position    Cuff Size    Pulse    Temp 97.2   Resp    SpO2    Weight 236 lb 9.6 oz   Height 5' 6\"   Body mass index 38.18 kg/m2   Pain Level        Recent Post Op Lab Work:   3 month labs- Vit D-42, glucose-172, B1 pending  Lab Results   Component Value Date/Time    VITD25 42 10/14/2022 09:15 AM       Date of Surgery: 7/11/22  Type of Surgery: gastric sleeve      Initial Weight: 273 lbs at pre-op teaching class   Excess  Body Weight Pre-Op: 127  lbs     Weight Loss: 37 lbs. Patient's Target Weight =  146 lbs for a BMI of ~25  Percentage of Excess Body Weight Lost to date is :  29%    Started working for [employer]- five days a week- recently      Are you experiencing any physical problems post surgery: Denies any problems with bowels moving    Are you experiencing any dietary problems post surgery:\"I think the food choices Im eating may not be right\". - Pt threw up after chicken jesusita and eating 1/2 of personal pan pizza- suspect pt ate too much at once    How frequently are you eating? Meal times varied with work schedule  How long does it take you to finish a meal? Taking time to eat-   How full do you feel after eating? Pt feel full after eats    Eating baby food, wheat thin crackers- \"I am eating on the run\". Yesterday had [employer]'s Filet Fish sandwich in evening for supper meal    Protein intake: 60-80 grams/day   Patient taking protein supplement: Yes. Brand of Supplement: Premier Protein Shakes two per day, or [employer] Equate Brand Vanilla flavor BID     Fluid intake: Denies any difficulty with fluid intake - four bottles a day without difficulty     Multivitamin/mineral intake: Celebrate One 45- capsules one a day - advised pt to take this in evening to see if better tolerated.   If still having difficulty then will switch to [place] Chewable with Iron BID     Calcium intake: Yes. 500 mg calcium soft chew two per day  Reviewed switching to Viactiv  calcium soft chew now that ran out of Bariatric Advantage brand. Pt advised to take the calcium OTC soft chew with food in order to absorb it       Assessment  Reliance of baby foods/soft mush foods and crackers d/t job. Requires to make effort to set aside time to eat meals and try different foods as has not been doing this      Plan  Plan/Recommendations: Pt educated on three month  post op nutrition guidelines. Questions answered. See additional instructions below. -  Patient Instructions   1. Continue bariatric vitamins as you are currently taking. Try taking your multivitamin  at nite instead of morning. If still have problems than can switch to [place] Chewable with Iron two per day. Take One Viactiv calcium chew with first break and food and second calcium chew at second break. 2. Goal continues to be 60-80 grams protein per day. Focus on choosing protein foods first at meals to remain full and maintain muscle mass while you continue to lose from body fat stores. 3. Regular physical activity is important if desire to continue weight loss efforts. Recommend regular cardiac activity and strength training 2-3 days per week. 4.  Water goal is 64 oz per day and make sure no liquids 30 minutes before meals and no liquids 30 minutes after meals  5. Take 20-30 minutes to eat meals and chew all foods well for best tolerance  6/.  Make sure take your two 15 minute breaks and 30 minute break at work- [employer] for work. ok for deli meat, P3, canned fruit cups (peaches or pears), string cheese, tuna packets   get lab work done at least 5 -7 days  before next office visit.     Recommended Follow-Up: three month post op with PA and JIMENEZ Luna RD, LD,   Dietitian- Weight Management [address] [place] [address]

## 2022-10-17 ENCOUNTER — OFFICE VISIT (OUTPATIENT)
Dept: BARIATRICS/WEIGHT MGMT | Age: 47
End: 2022-10-17
Payer: COMMERCIAL

## 2022-10-17 ENCOUNTER — OFFICE VISIT (OUTPATIENT)
Dept: BARIATRICS/WEIGHT MGMT | Age: 47
End: 2022-10-17

## 2022-10-17 VITALS
SYSTOLIC BLOOD PRESSURE: 116 MMHG | HEART RATE: 68 BPM | DIASTOLIC BLOOD PRESSURE: 70 MMHG | HEIGHT: 66 IN | BODY MASS INDEX: 38.02 KG/M2 | TEMPERATURE: 97.2 F | WEIGHT: 236.6 LBS

## 2022-10-17 DIAGNOSIS — G47.33 OSA ON CPAP: ICD-10-CM

## 2022-10-17 DIAGNOSIS — F32.A DEPRESSION, UNSPECIFIED DEPRESSION TYPE: ICD-10-CM

## 2022-10-17 DIAGNOSIS — Z13.21 SCREENING FOR MALNUTRITION: ICD-10-CM

## 2022-10-17 DIAGNOSIS — K91.2 POSTSURGICAL MALABSORPTION: ICD-10-CM

## 2022-10-17 DIAGNOSIS — E78.5 HYPERLIPIDEMIA, UNSPECIFIED HYPERLIPIDEMIA TYPE: ICD-10-CM

## 2022-10-17 DIAGNOSIS — E11.9 TYPE 2 DIABETES MELLITUS WITHOUT COMPLICATION, WITHOUT LONG-TERM CURRENT USE OF INSULIN (HCC): ICD-10-CM

## 2022-10-17 DIAGNOSIS — Z99.89 OSA ON CPAP: ICD-10-CM

## 2022-10-17 DIAGNOSIS — Z90.3 S/P GASTRIC SLEEVE PROCEDURE: Primary | ICD-10-CM

## 2022-10-17 DIAGNOSIS — Z98.84 STATUS POST BARIATRIC SURGERY: Primary | ICD-10-CM

## 2022-10-17 DIAGNOSIS — F41.9 ANXIETY: ICD-10-CM

## 2022-10-17 PROCEDURE — 2022F DILAT RTA XM EVC RTNOPTHY: CPT | Performed by: PHYSICIAN ASSISTANT

## 2022-10-17 PROCEDURE — 1036F TOBACCO NON-USER: CPT | Performed by: PHYSICIAN ASSISTANT

## 2022-10-17 PROCEDURE — G8417 CALC BMI ABV UP PARAM F/U: HCPCS | Performed by: PHYSICIAN ASSISTANT

## 2022-10-17 PROCEDURE — 99213 OFFICE O/P EST LOW 20 MIN: CPT | Performed by: PHYSICIAN ASSISTANT

## 2022-10-17 PROCEDURE — G8484 FLU IMMUNIZE NO ADMIN: HCPCS | Performed by: PHYSICIAN ASSISTANT

## 2022-10-17 PROCEDURE — 3051F HG A1C>EQUAL 7.0%<8.0%: CPT | Performed by: PHYSICIAN ASSISTANT

## 2022-10-17 PROCEDURE — G8427 DOCREV CUR MEDS BY ELIG CLIN: HCPCS | Performed by: PHYSICIAN ASSISTANT

## 2022-10-17 RX ORDER — QUETIAPINE FUMARATE 50 MG/1
50 TABLET, FILM COATED ORAL DAILY
COMMUNITY

## 2022-10-17 RX ORDER — BUSPIRONE HYDROCHLORIDE 7.5 MG/1
TABLET ORAL
COMMUNITY
Start: 2022-09-28

## 2022-10-17 RX ORDER — PAROXETINE HYDROCHLORIDE 20 MG/1
TABLET, FILM COATED ORAL
COMMUNITY
Start: 2022-09-23

## 2022-10-17 NOTE — PROGRESS NOTES
[facility]  [address], [address]  [place]      Visit Date:  10/17/2022  Weight Management Postop Follow-up    HPI:      Hipolito Montgomery is a 49 y.o. female who is here today for 3 month follow up since  laparoscopic sleeve gastrectomy performed by Dr. Marcell Ocampo  on 7/11/22. Reports that she has been struggling to tolerate solid foods over the last month. Food is not getting stuck. Has been eating pasta/pizza and then 20 minutes later will cough and throw up. Feels that she is eating too fast. Long discussion today about portion sizes and how quickly she is eating and food choices. Not drinking and eating at the same time. Has been eating wheat thins/ soft foods without difficulty. Drinking 2 protein shakes daily. Continues to drink 4-5 bottles of water daily. No pop/carbonation. Reports nausea that starts after taking morning CelebrateONE. Will switch to [place] with Iron 2 daily. Taking Calcium without any issues. Weight today 236#. Down 9# since last visit. Down 33# since surgery. BMI 38 . No problems with bowel movements-continues to take stool softeners twice daily. No GERD/ Reflux-continues to take PPI. Denies CP/SOB. No Dizziness. No abdominal pain. No incisional discomfort. No sx of dehydration. Seca scale completed and reviewed. Wearing CPAP Qhs. Checking sugars and running  130-170. Following with PCP. Has not yet resumed any diabetic medications. Depression and anxiety stable with medication. Physical Activity: Walking/on feet all day with job through [employer]. Has not added strength training. Current BMI: Body mass index is 38.19 kg/m².   Current Weight:   Wt Readings from Last 3 Encounters:   10/17/22 236 lb 9.6 oz (107.3 kg)   09/02/22 242 lb (109.8 kg)   08/23/22 245 lb 3.2 oz (111.2 kg)     Initial weight: 281  Pre-op Body [id number]      Past Medical History:  Past Medical History:   Diagnosis Date    Allergic rhinitis     Anxiety     Asthma     Depression     Diabetes ([facility] [address]     Hyperlipidemia     Psychiatric problem     depression    Sleep apnea        Past Surgical History:  Past Surgical History:   Procedure Laterality Date    CARPAL TUNNEL RELEASE       SECTION      HYSTERECTOMY (CERVIX STATUS UNKNOWN)      KNEE ARTHROSCOPY      SLEEVE GASTRECTOMY N/A 2022    Robotic Sleeve Gastrectomy performed by Abisai Moore MD at [place] [facility]       Past Social History:  Social History     Socioeconomic History    Marital status:      Spouse name: Not on file    Number of children: 3    Years of education: Not on file    Highest education level: Not on file   Occupational History    Not on file   Tobacco Use    Smoking status: Never    Smokeless tobacco: Never   Vaping Use    Vaping Use: Never used   Substance and Sexual Activity    Alcohol use: Yes     Comment: socially    Drug use: No    Sexual activity: Yes   Other Topics Concern    Not on file   Social History Narrative    Not on file     Social Determinants of Health     Financial Resource Strain: Low Risk     Difficulty of Paying Living Expenses: Not very hard   Food Insecurity: Food Insecurity Present    Worried About Running Out of Food in the Last Year: Sometimes true    Ran Out of Food in the Last Year: Sometimes true   Transportation Needs: No Transportation Needs    Lack of Transportation (Medical): No    Lack of Transportation (Non-Medical): No   Physical Activity: Sufficiently Active    Days of Exercise per Week: 5 days    Minutes of Exercise per Session: 30 min   Stress: Stress Concern Present    Feeling of Stress : Rather much   Social Connections: Moderately Integrated    Frequency of Communication with Friends and Family: More than three times a week    Frequency of Social Gatherings with Friends and Family:  Three times a week    Attends Taoist Services: More than 4 times per year    Active Member of Clubs or Organizations: No    Attends Club or Organization Meetings: Patient refused    Marital Status:    Intimate Partner Violence: Not At Risk    Fear of Current or Ex-Partner: No    Emotionally Abused: No    Physically Abused: No    Sexually Abused: No   Housing Stability: Low Risk     Unable to Pay for Housing in the Last Year: No    Number of [place] in the Last Year: 1    Unstable Housing in the Last Year: No        Medications:   Current Outpatient Medications   Medication Sig Dispense Refill    QUEtiapine (SEROQUEL) 50 MG tablet Take 50 mg by mouth daily      docusate sodium (COLACE) 100 MG capsule TAKE ONE CAPSULE BY MOUTH TWICE A DAY AS NEEDED TO PREVENT CONSTIPATION 30 capsule 1    calcium citrate (CALCITRATE) 950 (200 Ca) MG tablet Take 1 tablet by mouth in the morning. Multiple Vitamins-Minerals (THERAPEUTIC MULTIVITAMIN-MINERALS) tablet Take 1 tablet by mouth daily      CPAP Machine MISC by Does not apply route Please check patient's CPAP machine for proper functioning by [employer]. She was prescribed with a CPAP with a pressure of 11 cm of water with room air. 1 each 0    omeprazole (PRILOSEC) 40 MG delayed release capsule Take 1 capsule by mouth daily      oxybutynin (DITROPAN-XL) 5 MG extended release tablet Take 1 tablet by mouth daily       atorvastatin (LIPITOR) 40 MG tablet TAKE ONE TABLET BY MOUTH DAILY 30 tablet 3    Blood Glucose Monitoring Suppl (ACCU-CHEK ROD PLUS) w/Device KIT Check blood sugar once daily 1 kit 0    glucose blood VI test strips (ACCU-CHEK ROD PLUS) strip Check blood sugar once daily 100 each 3    Lancets MISC Use to test blood glucose level 1 time per day.  Diagnosis: E11.9 100 each 1    Cetirizine HCl 10 MG CAPS Take 1 tablet by mouth daily      montelukast (SINGULAIR) 10 MG tablet Take 10 mg by mouth nightly      rOPINIRole (REQUIP) 4 MG tablet Take 4 mg by mouth nightly       albuterol sulfate  (90 BASE) MCG/ACT inhaler Inhale 2 puffs into the lungs every 6 hours as needed for Wheezing      busPIRone (BUSPAR) 7.5 MG tablet       PARoxetine (PAXIL) 20 MG tablet        No current facility-administered medications for this visit. Allergies: Allergies   Allergen Reactions    Latex Rash    Keflex [Cephalexin] Rash    Zofran [Ondansetron] Hallucinations    Betadine [Povidone Iodine] Itching and Swelling     Iv site - main area from parekh catheter    Sertraline Hcl Other (See Comments)    Bacitracin Rash    Neosporin [Neomycin-Polymyxin-Gramicidin] Rash    Pcn [Penicillins] Hives, Swelling and Rash    Rocephin [Ceftriaxone] Rash       Subjective:    Review of Systems:  Constitutional: Denies any fever, chills, fatigue. Wound: Denies any rash, skin color changes or wound problems. Resp: Denies any cough, shortness of breath. CV: Denies any chest pain, orthopnea or syncope. MS: Denies myalgias, (+) arthralgias. GI: Denies any nausea, vomiting, diarrhea, constipation, melena, hematochezia. No incisional discomfort. : Denies any hematuria, hesitancy or dysuria. NEURO: Denies seizures, headache. Objective:    /70 (Site: Right Upper Arm, Position: Sitting, Cuff Size: Large Adult)   Pulse 68   Temp 97.2 °F (36.2 °C) (Infrared)   Ht 5' 6\" (1.676 m)   Wt 236 lb 9.6 oz (107.3 kg)   BMI 38.19 kg/m²     Physical Examination:   Constitutional: Alert and oriented to person, place and time. Well-developed, well- nourished. Head: Normocephalic and atraumatic  Neck: Supple. Eyes: EOMI b/l. Conjunctivae normal.  No scleral icterus. Respiratory: Effort normal. No respiratory distress. Abd: Well healed incisions. Non-tender throughout. Ext:  Movement x 4. No edema  Skin; Warm and dry, no visible rashes, lesions or ulcers.    Neuro: Cranial Nerves Grossly Intact; nml coordination          Labs:  CBC   Lab Results   Component Value Date/Time    WBC 5.7 10/14/2022 09:15 AM    RBC 4.44 10/14/2022 09:15 AM    HGB 13.0 10/14/2022 09:15 AM    HCT 37.7 10/14/2022 09:15 AM    MCV 84.9 10/14/2022 09:15 AM    MCH 29.3 10/14/2022 09:15 AM    MCHC 34.5 10/14/2022 09:15 AM    RDW 15.0 07/27/2017 06:33 AM     10/14/2022 09:15 AM    MPV 11.7 10/14/2022 09:15 AM    RBCMORP NORMAL 07/27/2017 06:33 AM    SEGSPCT 62.3 10/14/2022 09:15 AM    LABLYMP 24.9 10/14/2022 09:15 AM    MONOPCT 7.4 10/14/2022 09:15 AM    LABEOS 4.2 10/14/2022 09:15 AM    BASO 0.7 10/14/2022 09:15 AM    NRBC 0 10/14/2022 09:15 AM    ANISOCYTOSIS 1+ 07/27/2017 06:33 AM    SEGSABS 3.6 10/14/2022 09:15 AM    LYMPHSABS 1.4 10/14/2022 09:15 AM    MONOSABS 0.4 10/14/2022 09:15 AM    EOSABS 0.2 10/14/2022 09:15 AM    BASOSABS 0.0 10/14/2022 09:15 AM        BMP/CMP   Lab Results   Component Value Date/Time    GLUCOSE 172 10/14/2022 09:15 AM    CREATININE 0.5 10/14/2022 09:15 AM    BUN 10 10/14/2022 09:15 AM     10/14/2022 09:15 AM    K 3.9 10/14/2022 09:15 AM    K 3.8 08/12/2022 06:06 PM     10/14/2022 09:15 AM    CO2 23 10/14/2022 09:15 AM    CALCIUM 9.3 10/14/2022 09:15 AM    AST 26 10/14/2022 09:15 AM    ALKPHOS 95 10/14/2022 09:15 AM    PROT 6.9 10/14/2022 09:15 AM    LABALBU 4.0 10/14/2022 09:15 AM    BILITOT 0.7 10/14/2022 09:15 AM    ALT 29 10/14/2022 09:15 AM        PREALBUMIN   Lab Results   Component Value Date/Time    PREALBUMIN 17.3 10/14/2022 09:15 AM        VITAMIN B12   Lab Results   Component Value Date/Time    [id number] 784 05/25/2022 02:51 PM        24 HOUR URINE CALCIUM   No results found for: Cleopatra Stall, HOURSC, CALCIUMUR     VITAMIN D   Lab Results   Component Value Date/Time    VITD25 42 10/14/2022 09:15 AM        VITAMIN B1/ THIAMINE   Lab Results   Component Value Date/Time    [id number] 123 05/25/2022 02:51 PM        RBC FOLATE   Lab Results   Component Value Date/Time    FOLATE > 20.0 05/25/2022 02:51 PM        LIPID SCREEN (FASTING)   Lab Results   Component Value Date/Time    CHOL 122 05/25/2022 02:51 PM    TRIG 75 05/25/2022 02:51 PM    HDL 65 05/25/2022 02:51 PM    LDLCALC 42 05/25/2022 02:51 PM   ,     HGA1C (T2DM ONLY)   Lab Results   Component Value Date/Time    LABA1C 7.1 05/25/2022 02:51 PM    AVGG 153 05/25/2022 02:51 PM        TSH   Lab Results   Component Value Date/Time    TSH 0.770 05/25/2022 02:51 PM        IRON   Lab Results   Component Value Date/Time    IRON 62 08/16/2022 12:13 PM        IONIZED CALCIUM   No results found for: RAFIA EVANS      Assessment:       Diagnosis Orders   1. S/P gastric sleeve procedure  CBC with Auto Differential    Comprehensive Metabolic Panel    Ferritin    Hemoglobin A1C    Iron    Iron Binding Capacity    Prealbumin    PTH, Intact    TSH with Reflex    Vitamin B1    Vitamin D 25 Hydroxy      2. BMI 38.0-38.9,adult  CBC with Auto Differential    Comprehensive Metabolic Panel    Ferritin    Hemoglobin A1C    Iron    Iron Binding Capacity    Prealbumin    PTH, Intact    TSH with Reflex    Vitamin B1    Vitamin D 25 Hydroxy      3. Postsurgical malabsorption  CBC with Auto Differential    Comprehensive Metabolic Panel    Ferritin    Hemoglobin A1C    Iron    Iron Binding Capacity    Prealbumin    PTH, Intact    TSH with Reflex    Vitamin B1    Vitamin D 25 Hydroxy      4. Screening for malnutrition  CBC with Auto Differential    Comprehensive Metabolic Panel    Ferritin    Hemoglobin A1C    Iron    Iron Binding Capacity    Prealbumin    PTH, Intact    TSH with Reflex    Vitamin B1    Vitamin D 25 Hydroxy      5. Depression, unspecified depression type        6. Anxiety        7. Type 2 diabetes mellitus without complication, without long-term current use of insulin ([address]        8. DARIEN on CPAP        9. Hyperlipidemia, unspecified hyperlipidemia type                Plan:    Stay well hydrated. Drink a minimum of 64 oz of non-carbonated, non-caffeinated fluids daily. Nutritional education occurred during visit. Tolerating diet. Continue following with dietitian and follow their recommendations as directed. Continue  60-80  grams of protein each day.  Continue to track.   Signs and symptoms reviewed with patient that would be concerning and need her to return to office for re-evaluation. Patient will call if any questions or concerns arrise. Importance of physical activity discussed with patient. Continue physical activity and add strength training  Continue taking Multivitamin ( Switch to [place] with Iron 2 daily) Calcium   Continue Omeprazole  Encouraged to attend support groups  3 month labs reviewed with patient today  6 month labs ordered- to be drawn one week prior to next apt. SECA scale completed and reviewed with patient today. Wear CPAP - adjust pressure with pulmonary as needed  Continue to monitor blood sugars - adjust medication with family doctor as needed  To meet with dietitian today. Advised to avoid breads/pasta/rice. To discuss portion size/ food choices with dietitian today. If no improvement with given recommendations, call office and will send to GI for EGD. Return in about 3 months (around 1/17/2023) for postop follow up. I spent over 20 minutes with the patient, with greater that 50% of that time spent on face counseling for nutrition and exercise.     Electronically signed by DANIELITO Sullivan on 10/17/2022 at 2:08 PM

## 2022-10-17 NOTE — PATIENT INSTRUCTIONS
1. Continue bariatric vitamins as you are currently taking. Try taking your multivitamin  at nite instead of morning. If still have problems than can switch to [place] Chewable with Iron two per day. Take One Viactiv calcium chew with first break and food and second calcium chew at second break. 2. Goal continues to be 60-80 grams protein per day. Focus on choosing protein foods first at meals to remain full and maintain muscle mass while you continue to lose from body fat stores. 3. Regular physical activity is important if desire to continue weight loss efforts. Recommend regular cardiac activity and strength training 2-3 days per week. 4.  Water goal is 64 oz per day and make sure no liquids 30 minutes before meals and no liquids 30 minutes after meals  5. Take 20-30 minutes to eat meals and chew all foods well for best tolerance  6/.  Make sure take your two 15 minute breaks and 30 minute break at work- [employer] for work. ok for deli meat, P3, canned fruit cups (peaches or pears), string cheese, tuna packets   get lab work done at least 5 -7 days  before next office visit.

## 2022-10-17 NOTE — PATIENT INSTRUCTIONS
Stay well hydrated. Drink a minimum of 64 oz of non-carbonated, non-caffeinated fluids daily. Nutritional education occurred during visit. Tolerating diet. Continue following with dietitian and follow their recommendations as directed. Continue  60-80  grams of protein each day. Continue to track. Signs and symptoms reviewed with patient that would be concerning and need her to return to office for re-evaluation. Patient will call if any questions or concerns arrise. Importance of physical activity discussed with patient. Continue physical activity and add strength training  Continue taking Multivitamin ( Switch to [place] with Iron 2 daily) Calcium   Continue Omeprazole  Encouraged to attend support groups  3 month labs reviewed with patient today  6 month labs ordered- to be drawn one week prior to next apt. SECA scale completed and reviewed with patient today. Wear CPAP - adjust pressure with pulmonary as needed  Continue to monitor blood sugars - adjust medication with family doctor as needed  To meet with dietitian today. Advised to avoid breads/pasta/rice. To discuss portion size with dietitian today. If no improvement with given recommendations, call office and will sent to GI for EGD.

## 2022-10-21 LAB — VITAMIN B1 WHOLE BLOOD: 116 NMOL/L (ref 70–180)

## 2023-01-09 ENCOUNTER — HOSPITAL ENCOUNTER (OUTPATIENT)
Age: 48
Discharge: HOME | End: 2023-01-09
Payer: COMMERCIAL

## 2023-01-09 DIAGNOSIS — Z90.3 S/P GASTRIC SLEEVE PROCEDURE: ICD-10-CM

## 2023-01-09 DIAGNOSIS — Z13.21 SCREENING FOR MALNUTRITION: ICD-10-CM

## 2023-01-09 DIAGNOSIS — K91.2 POSTSURGICAL MALABSORPTION: ICD-10-CM

## 2023-01-09 LAB
ALBUMIN SERPL-MCNC: 4.1 G/DL (ref 3.5–5.1)
ALP BLD-CCNC: 82 U/L (ref 38–126)
ALT SERPL-CCNC: 16 U/L (ref 11–66)
ANION GAP SERPL CALCULATED.3IONS-SCNC: 13 MEQ/L (ref 8–16)
AST SERPL-CCNC: 15 U/L (ref 5–40)
AVERAGE GLUCOSE: 162 MG/DL (ref 70–126)
BASOPHILS # BLD: 1.1 %
BASOPHILS ABSOLUTE: 0.1 THOU/MM3 (ref 0–0.1)
BILIRUB SERPL-MCNC: 0.6 MG/DL (ref 0.3–1.2)
BUN BLDV-MCNC: 13 MG/DL (ref 7–22)
CALCIUM SERPL-MCNC: 9.2 MG/DL (ref 8.5–10.5)
CHLORIDE BLD-SCNC: 102 MEQ/L (ref 98–111)
CO2: 25 MEQ/L (ref 23–33)
CREAT SERPL-MCNC: 0.6 MG/DL (ref 0.4–1.2)
EOSINOPHIL # BLD: 2.6 %
EOSINOPHILS ABSOLUTE: 0.2 THOU/MM3 (ref 0–0.4)
ERYTHROCYTE [DISTWIDTH] IN BLOOD BY AUTOMATED COUNT: 13.3 % (ref 11.5–14.5)
ERYTHROCYTE [DISTWIDTH] IN BLOOD BY AUTOMATED COUNT: 43.3 FL (ref 35–45)
FERRITIN: 81 NG/ML (ref 10–291)
GFR SERPL CREATININE-BSD FRML MDRD: > 60 ML/MIN/1.73M2
GLUCOSE BLD-MCNC: 159 MG/DL (ref 70–108)
HBA1C MFR BLD: 7.4 % (ref 4.4–6.4)
HCT VFR BLD CALC: 39.5 % (ref 37–47)
HEMOGLOBIN: 13.5 GM/DL (ref 12–16)
IMMATURE GRANS (ABS): 0.01 THOU/MM3 (ref 0–0.07)
IMMATURE GRANULOCYTES: 0.1 %
IRON: 67 UG/DL (ref 50–170)
LYMPHOCYTES # BLD: 22 %
LYMPHOCYTES ABSOLUTE: 1.6 THOU/MM3 (ref 1–4.8)
MCH RBC QN AUTO: 30.4 PG (ref 26–33)
MCHC RBC AUTO-ENTMCNC: 34.2 GM/DL (ref 32.2–35.5)
MCV RBC AUTO: 89 FL (ref 81–99)
MONOCYTES # BLD: 7.9 %
MONOCYTES ABSOLUTE: 0.6 THOU/MM3 (ref 0.4–1.3)
NUCLEATED RED BLOOD CELLS: 0 /100 WBC
PLATELET # BLD: 228 THOU/MM3 (ref 130–400)
PMV BLD AUTO: 11.6 FL (ref 9.4–12.4)
POTASSIUM SERPL-SCNC: 4.2 MEQ/L (ref 3.5–5.2)
PREALBUMIN: 19.8 MG/DL (ref 20–40)
PTH INTACT: 39.6 PG/ML (ref 15–65)
RBC # BLD: 4.44 MILL/MM3 (ref 4.2–5.4)
SEG NEUTROPHILS: 66.3 %
SEGMENTED NEUTROPHILS ABSOLUTE COUNT: 4.8 THOU/MM3 (ref 1.8–7.7)
SODIUM BLD-SCNC: 140 MEQ/L (ref 135–145)
TOTAL IRON BINDING CAPACITY: 284 UG/DL (ref 171–450)
TOTAL PROTEIN: 7 G/DL (ref 6.1–8)
TSH SERPL DL<=0.05 MIU/L-ACNC: 0.63 UIU/ML (ref 0.4–4.2)
VITAMIN D 25-HYDROXY: 36 NG/ML (ref 30–100)
WBC # BLD: 7.2 THOU/MM3 (ref 4.8–10.8)

## 2023-01-09 PROCEDURE — 80053 COMPREHEN METABOLIC PANEL: CPT

## 2023-01-09 PROCEDURE — 36415 COLL VENOUS BLD VENIPUNCTURE: CPT

## 2023-01-09 PROCEDURE — 84134 ASSAY OF PREALBUMIN: CPT

## 2023-01-09 PROCEDURE — 83540 ASSAY OF IRON: CPT

## 2023-01-09 PROCEDURE — 83970 ASSAY OF PARATHORMONE: CPT

## 2023-01-09 PROCEDURE — 82306 VITAMIN D 25 HYDROXY: CPT

## 2023-01-09 PROCEDURE — 85025 COMPLETE CBC W/AUTO DIFF WBC: CPT

## 2023-01-09 PROCEDURE — 84443 ASSAY THYROID STIM HORMONE: CPT

## 2023-01-09 PROCEDURE — 83036 HEMOGLOBIN GLYCOSYLATED A1C: CPT

## 2023-01-09 PROCEDURE — 83550 IRON BINDING TEST: CPT

## 2023-01-09 PROCEDURE — 84425 ASSAY OF VITAMIN B-1: CPT

## 2023-01-09 PROCEDURE — 82728 ASSAY OF FERRITIN: CPT

## 2023-01-12 ENCOUNTER — OFFICE VISIT (OUTPATIENT)
Dept: BARIATRICS/WEIGHT MGMT | Age: 48
End: 2023-01-12

## 2023-01-12 VITALS
TEMPERATURE: 97.5 F | DIASTOLIC BLOOD PRESSURE: 76 MMHG | SYSTOLIC BLOOD PRESSURE: 114 MMHG | BODY MASS INDEX: 35.77 KG/M2 | HEIGHT: 66 IN | HEART RATE: 76 BPM | WEIGHT: 222.6 LBS

## 2023-01-12 DIAGNOSIS — E11.9 TYPE 2 DIABETES MELLITUS WITHOUT COMPLICATION, WITHOUT LONG-TERM CURRENT USE OF INSULIN (HCC): ICD-10-CM

## 2023-01-12 DIAGNOSIS — G47.33 OSA ON CPAP: ICD-10-CM

## 2023-01-12 DIAGNOSIS — F32.A DEPRESSION, UNSPECIFIED DEPRESSION TYPE: ICD-10-CM

## 2023-01-12 DIAGNOSIS — F41.9 ANXIETY: ICD-10-CM

## 2023-01-12 DIAGNOSIS — Z90.3 S/P GASTRIC SLEEVE PROCEDURE: Primary | ICD-10-CM

## 2023-01-12 DIAGNOSIS — E78.5 HYPERLIPIDEMIA, UNSPECIFIED HYPERLIPIDEMIA TYPE: ICD-10-CM

## 2023-01-12 DIAGNOSIS — Z99.89 OSA ON CPAP: ICD-10-CM

## 2023-01-12 RX ORDER — TRAZODONE HYDROCHLORIDE 150 MG/1
150 TABLET ORAL NIGHTLY
COMMUNITY

## 2023-01-12 NOTE — PATIENT INSTRUCTIONS
Stay well hydrated. Drink a minimum of 64 oz of non-carbonated, non-caffeinated fluids daily. Nutritional education occurred during visit. Tolerating diet. Continue following with dietitian and follow their recommendations as directed. Continue  60-80 grams of protein each day. Signs and symptoms reviewed with patient that would be concerning and need her to return to office for re-evaluation. Patient will call if any questions or concerns arrise. Importance of physical activity discussed with patient. Increase physical activity as tolerated  Add strength training  Resume Multivitamin and Calcium   Continue Omeprazole  Encouraged to attend support groups  6 month labs reviewed with patient today- B1 pending  SECA scale completed and reviewed with patient today  Measurements completed and reviewed with patient today  Wear CPAP - adjust pressure with pulmonary as needed  A1C 7.4-start monitoring blood sugars - apt with PCP later this month to discuss resuming diabetic medications. Advised to try  medications for better tolerance. If continues to have night time vomiting, will need to follow up with family doctor. Max of 8oz of coffee daily  Increase water to 64oz daily.

## 2023-01-12 NOTE — PROGRESS NOTES
[facility]  [address], [address]  [place]      Visit Date:  1/12/2023  Weight Management Postop Follow-up    HPI:      Yuridia Alcala is a 49 y.o. female who is here today for 6 month follow up since  laparoscopic sleeve gastrectomy performed by Dr. Blaise Hernandez  on 7/11/22. Reports that she is doing well from a bariatric standpoint. Has been having issues with vomiting in the evening 10-15 minutes after taking night time medication. Has not tried  medications so that she is not taking so many at once. Will try doing this. If still unable to tolerate will follow up with her PCP. Held Vitamins for the last 2 weeks and no change with night time vomiting. Plans to resume bariatric vitamins/ calcium. Weight today 222#. Down 14# since last visit. Down 47# since surgery. BMI 35 . Needs to increase water intake. Currently drinking 2-3 bottles daily. Has increased coffee intake. Advised to decreased to [address] daily. Getting in plenty of protein. Continues to drink 2-3 protein shakes daily. No pop/ carbonation. Rarely eating sweets. Tolerating post-op diet. Has been driving for [facility]- walking around 15k steps daily. No problems with bowel movements. No nausea. No emesis. GERD well controlled with Prilosec. Denies CP/SOB. No Dizziness. No abdominal pain. 6 month labs reviewed. A1C 7.4-apt with PCP later this month and will discuss going back on Trulicity. Advised to start monitoring blood sugars. Depression and anxiety stable with medication. Wearing CPAP Qhs. Seca scale completed and reviewed. Physical Activity: Walking/on feet all day with job through [employer] Encouraged to add strength training. Current BMI: Body mass index is 35.93 kg/m².   Current Weight:   Wt Readings from Last 3 Encounters:   01/12/23 222 lb 9.6 oz (101 kg)   10/17/22 236 lb 9.6 oz (107.3 kg)   09/02/22 242 lb (109.8 kg)     Initial weight: 281  Pre-op Body [id number]      Past Medical History:  Past Medical History:   Diagnosis Date    Allergic rhinitis     Anxiety     Asthma     Depression     Diabetes ([address]     Hyperlipidemia     Psychiatric problem     depression    Sleep apnea        Past Surgical History:  Past Surgical History:   Procedure Laterality Date    CARPAL TUNNEL RELEASE       SECTION      HYSTERECTOMY (CERVIX STATUS UNKNOWN)      KNEE ARTHROSCOPY      SLEEVE GASTRECTOMY N/A 2022    Robotic Sleeve Gastrectomy performed by Edi Crandall MD at [facility]       Past Social History:  Social History     Socioeconomic History    Marital status:      Spouse name: Not on file    Number of children: 3    Years of education: Not on file    Highest education level: Not on file   Occupational History    Not on file   Tobacco Use    Smoking status: Never    Smokeless tobacco: Never   Vaping Use    Vaping Use: Never used   Substance and Sexual Activity    Alcohol use: Yes     Comment: socially    Drug use: No    Sexual activity: Yes   Other Topics Concern    Not on file   Social History Narrative    Not on file     Social Determinants of Health     Financial Resource Strain: Low Risk     Difficulty of Paying Living Expenses: Not very hard   Food Insecurity: Food Insecurity Present    Worried About [address] in the Last Year: Sometimes true    Ran Out of Food in the Last Year: Sometimes true   Transportation Needs: No Transportation Needs    Lack of Transportation (Medical): No    Lack of Transportation (Non-Medical): No   Physical Activity: Sufficiently Active    Days of Exercise per Week: 5 days    Minutes of Exercise per Session: 30 min   Stress: Stress Concern Present    Feeling of Stress : Rather much   Social Connections: Moderately Integrated    Frequency of Communication with Friends and Family: More than three times a week    Frequency of Social Gatherings with Friends and Family:  Three times a week    Attends Orthodoxy Services: More than 4 times per year    Active Member of Clubs or Organizations: No    Attends Club or Organization Meetings: Patient refused    Marital Status:    Intimate Partner Violence: Not At Risk    Fear of Current or Ex-Partner: No    Emotionally Abused: No    Physically Abused: No    Sexually Abused: No   Housing Stability: Low Risk     Unable to Pay for Housing in the Last Year: No    Number of [place] in the Last Year: 1    Unstable Housing in the Last Year: No        Medications:   Current Outpatient Medications   Medication Sig Dispense Refill    traZODone (DESYREL) 150 MG tablet Take 150 mg by mouth nightly      busPIRone (BUSPAR) 7.5 MG tablet       PARoxetine (PAXIL) 20 MG tablet       QUEtiapine (SEROQUEL) 50 MG tablet Take 50 mg by mouth daily      docusate sodium (COLACE) 100 MG capsule TAKE ONE CAPSULE BY MOUTH TWICE A DAY AS NEEDED TO PREVENT CONSTIPATION 30 capsule 1    CPAP Machine MISC by Does not apply route Please check patient's CPAP machine for proper functioning by [employer]. She was prescribed with a CPAP with a pressure of 11 cm of water with room air. 1 each 0    omeprazole (PRILOSEC) 40 MG delayed release capsule Take 1 capsule by mouth daily      oxybutynin (DITROPAN-XL) 5 MG extended release tablet Take 1 tablet by mouth daily       atorvastatin (LIPITOR) 40 MG tablet TAKE ONE TABLET BY MOUTH DAILY 30 tablet 3    Blood Glucose Monitoring Suppl (ACCU-CHEK ROD PLUS) w/Device KIT Check blood sugar once daily 1 kit 0    glucose blood VI test strips (ACCU-CHEK ROD PLUS) strip Check blood sugar once daily 100 each 3    Lancets MISC Use to test blood glucose level 1 time per day.  Diagnosis: E11.9 100 each 1    Cetirizine HCl 10 MG CAPS Take 1 tablet by mouth daily      montelukast (SINGULAIR) 10 MG tablet Take 10 mg by mouth nightly      rOPINIRole (REQUIP) 4 MG tablet Take 4 mg by mouth nightly       albuterol sulfate  (90 BASE) MCG/ACT inhaler Inhale 2 puffs into the lungs every 6 hours as needed for Wheezing      calcium citrate (CALCITRATE) 950 (200 Ca) MG tablet Take 1 tablet by mouth in the morning. (Patient not taking: Reported on 1/12/2023)      Multiple Vitamins-Minerals (THERAPEUTIC MULTIVITAMIN-MINERALS) tablet Take 1 tablet by mouth daily (Patient not taking: Reported on 1/12/2023)       No current facility-administered medications for this visit. Allergies: Allergies   Allergen Reactions    Latex Rash    Keflex [Cephalexin] Rash    Zofran [Ondansetron] Hallucinations    Betadine [Povidone Iodine] Itching and Swelling     Iv site - main area from parekh catheter    Sertraline Hcl Other (See Comments)    Bacitracin Rash    Neosporin [Neomycin-Polymyxin-Gramicidin] Rash    Pcn [Penicillins] Hives, Swelling and Rash    Rocephin [Ceftriaxone] Rash       Subjective:    Review of Systems:  Constitutional: Denies any fever, chills, fatigue. Wound: Denies any rash, skin color changes or wound problems. Resp: Denies any cough, shortness of breath. CV: Denies any chest pain, orthopnea or syncope. MS: Denies myalgias, (+) arthralgias. GI: Denies any nausea, vomiting, diarrhea, constipation, melena, hematochezia. No incisional discomfort. (+) reflux  : Denies any hematuria, hesitancy or dysuria. NEURO: Denies seizures, headache. Objective:    /76 (Site: Right Upper Arm, Position: Sitting, Cuff Size: Large Adult)   Pulse 76   Temp 97.5 °F (36.4 °C) (Infrared)   Ht 5' 6\" (1.676 m)   Wt 222 lb 9.6 oz (101 kg)   BMI 35.93 kg/m²   Physical Examination:   Constitutional: Alert and oriented to person, place and time. Well-developed, well- nourished. Head: Normocephalic and atraumatic  Neck: Supple. Eyes: EOMI b/l. Conjunctivae normal.  No scleral icterus. Respiratory: Effort normal. No respiratory distress. Abd: Benign  Ext:  Movement x 4. No edema  Skin; Warm and dry, no visible rashes, lesions or ulcers.    Neuro: Cranial Nerves Grossly Intact; nml coordination      Labs:  CBC   Lab Results   Component Value Date/Time    WBC 7.2 01/09/2023 03:15 PM    RBC 4.44 01/09/2023 03:15 PM    HGB 13.5 01/09/2023 03:15 PM    HCT 39.5 01/09/2023 03:15 PM    MCV 89.0 01/09/2023 03:15 PM    MCH 30.4 01/09/2023 03:15 PM    MCHC 34.2 01/09/2023 03:15 PM    RDW 15.0 07/27/2017 06:33 AM     01/09/2023 03:15 PM    MPV 11.6 01/09/2023 03:15 PM    RBCMORP NORMAL 07/27/2017 06:33 AM    SEGSPCT 66.3 01/09/2023 03:15 PM    LABLYMP 22.0 01/09/2023 03:15 PM    MONOPCT 7.9 01/09/2023 03:15 PM    LABEOS 2.6 01/09/2023 03:15 PM    BASO 1.1 01/09/2023 03:15 PM    NRBC 0 01/09/2023 03:15 PM    ANISOCYTOSIS 1+ 07/27/2017 06:33 AM    SEGSABS 4.8 01/09/2023 03:15 PM    LYMPHSABS 1.6 01/09/2023 03:15 PM    MONOSABS 0.6 01/09/2023 03:15 PM    EOSABS 0.2 01/09/2023 03:15 PM    BASOSABS 0.1 01/09/2023 03:15 PM        BMP/CMP   Lab Results   Component Value Date/Time    GLUCOSE 159 01/09/2023 03:15 PM    CREATININE 0.6 01/09/2023 03:15 PM    BUN 13 01/09/2023 03:15 PM     01/09/2023 03:15 PM    K 4.2 01/09/2023 03:15 PM    K 3.8 08/12/2022 06:06 PM     01/09/2023 03:15 PM    CO2 25 01/09/2023 03:15 PM    CALCIUM 9.2 01/09/2023 03:15 PM    AST 15 01/09/2023 03:15 PM    ALKPHOS 82 01/09/2023 03:15 PM    PROT 7.0 01/09/2023 03:15 PM    LABALBU 4.1 01/09/2023 03:15 PM    BILITOT 0.6 01/09/2023 03:15 PM    ALT 16 01/09/2023 03:15 PM        PREALBUMIN   Lab Results   Component Value Date/Time    PREALBUMIN 19.8 01/09/2023 03:15 PM        VITAMIN B12   Lab Results   Component Value Date/Time    ICQUIIYD57 784 05/25/2022 02:51 PM        24 HOUR URINE CALCIUM   No results found for: Radha Alvarez CALCIUMUR     VITAMIN D   Lab Results   Component Value Date/Time    VITD25 36 01/09/2023 03:15 PM        VITAMIN B1/ THIAMINE   Lab Results   Component Value Date/Time    [id number] 116 10/14/2022 09:15 AM        RBC FOLATE   Lab Results   Component Value Date/Time    FOLATE > 20.0 05/25/2022 02:51 PM        LIPID SCREEN (FASTING)   Lab Results   Component Value Date/Time    CHOL 122 05/25/2022 02:51 PM    TRIG 75 05/25/2022 02:51 PM    HDL 65 05/25/2022 02:51 PM    LDLCALC 42 05/25/2022 02:51 PM   ,     HGA1C (T2DM ONLY)   Lab Results   Component Value Date/Time    LABA1C 7.4 01/09/2023 03:15 PM    AVGG 162 01/09/2023 03:15 PM        TSH   Lab Results   Component Value Date/Time    TSH 0.629 01/09/2023 03:15 PM        IRON   Lab Results   Component Value Date/Time    IRON 67 01/09/2023 03:15 PM        IONIZED CALCIUM   No results found for: RAFIA EVANS      Assessment:       Diagnosis Orders   1. S/P gastric sleeve procedure        2. BMI 36.0-36.9,adult        3. Depression, unspecified depression type        4. Anxiety        5. Type 2 diabetes mellitus without complication, without long-term current use of insulin ([address]        6. DARIEN on CPAP        7. Hyperlipidemia, unspecified hyperlipidemia type              Plan:    Stay well hydrated. Drink a minimum of 64 oz of non-carbonated, non-caffeinated fluids daily. Nutritional education occurred during visit. Tolerating diet. Continue following with dietitian and follow their recommendations as directed. Continue  60-80 grams of protein each day. Signs and symptoms reviewed with patient that would be concerning and need her to return to office for re-evaluation. Patient will call if any questions or concerns arrise. Importance of physical activity discussed with patient.    Increase physical activity as tolerated  Add strength training  Resume Multivitamin and Calcium   Continue Omeprazole  Encouraged to attend support groups  6 month labs reviewed with patient today- B1 pending  SECA scale completed and reviewed with patient today  Measurements completed and reviewed with patient today  Wear CPAP - adjust pressure with pulmonary as needed  A1C 7.4-start monitoring blood sugars - apt with PCP later this month to discuss resuming diabetic medications. Advised to try  medications for better tolerance. If continues to have night time vomiting, will need to follow up with family doctor. Max of 8oz of coffee daily  Increase water to 64oz daily. Return in about 3 months (around 4/12/2023) for postop follow up. I spent over 20 minutes with the patient, with greater that 50% of that time spent on face counseling for nutrition and exercise.     Electronically signed by DANIELITO Conde on 1/12/2023 at 1:08 PM

## 2023-03-01 ENCOUNTER — OFFICE VISIT (OUTPATIENT)
Dept: PULMONOLOGY | Age: 48
End: 2023-03-01
Payer: COMMERCIAL

## 2023-03-01 VITALS
HEIGHT: 65 IN | WEIGHT: 221.4 LBS | SYSTOLIC BLOOD PRESSURE: 114 MMHG | HEART RATE: 75 BPM | BODY MASS INDEX: 36.89 KG/M2 | DIASTOLIC BLOOD PRESSURE: 72 MMHG | OXYGEN SATURATION: 97 % | TEMPERATURE: 97.8 F

## 2023-03-01 DIAGNOSIS — G47.33 OSA ON CPAP: Primary | ICD-10-CM

## 2023-03-01 DIAGNOSIS — G47.00 INSOMNIA, UNSPECIFIED TYPE: ICD-10-CM

## 2023-03-01 DIAGNOSIS — Z99.89 OSA ON CPAP: Primary | ICD-10-CM

## 2023-03-01 DIAGNOSIS — G25.81 RLS (RESTLESS LEGS SYNDROME): ICD-10-CM

## 2023-03-01 DIAGNOSIS — E66.9 OBESITY (BMI 30-39.9): ICD-10-CM

## 2023-03-01 PROCEDURE — G8417 CALC BMI ABV UP PARAM F/U: HCPCS | Performed by: NURSE PRACTITIONER

## 2023-03-01 PROCEDURE — G8484 FLU IMMUNIZE NO ADMIN: HCPCS | Performed by: NURSE PRACTITIONER

## 2023-03-01 PROCEDURE — 99213 OFFICE O/P EST LOW 20 MIN: CPT | Performed by: NURSE PRACTITIONER

## 2023-03-01 PROCEDURE — 1036F TOBACCO NON-USER: CPT | Performed by: NURSE PRACTITIONER

## 2023-03-01 PROCEDURE — G8427 DOCREV CUR MEDS BY ELIG CLIN: HCPCS | Performed by: NURSE PRACTITIONER

## 2023-03-01 ASSESSMENT — ENCOUNTER SYMPTOMS
SHORTNESS OF BREATH: 0
COUGH: 0
WHEEZING: 0
EYES NEGATIVE: 1
ALLERGIC/IMMUNOLOGIC NEGATIVE: 1
GASTROINTESTINAL NEGATIVE: 1
RESPIRATORY NEGATIVE: 1

## 2023-03-01 NOTE — PROGRESS NOTES
[facility]      Yumiko Ballard         [id number]  3/1/2023   Chief Complaint   Patient presents with    Follow-up     6 month DARIEN follow up with download. Pt of Dr. Tavia Zuniga    PAP Download:   Original or initial AHI: 45.1     Date of initial study: 10/20/16      Compliant  80%   Noncompliant 0%     PAP Type CPAP Level  11 cmH20   Avg Hrs/Day 8 hours 28 minutes  AHI: 1.3   Recorded compliance dates 1/29/23-2/27/23  Machine/Mfg:   [x] ResMed    [] Respironics/Dreamstation   Interface:   [] Nasal    [] Nasal pillows   [x] FFM      Provider:      [x] [facility]     []Robel     [] [facility]    [] Kimberley Ast    [] [facility]               [] [facility]      [] [facility]    [] [address]      [] Other    Neck Size: 16 inches  Mallampati 3  ESS:  5  SAQLI: 74    Here is a scan of the most recent download:                Presentation:   Majo Rios presents for sleep medicine follow up for obstructive sleep apnea  Since the last visit, Majo Rios has been pretty compliant with her PAP therapy and is seeing benefit from is use  Obese BMI 36- but improving   Currently following with [facility] - gastric sleeve procedure done on 7/11/22  Trazodone use some nights- RX per other provider   Awake and alert today in the office   Requip use at night for RLS- prescribed per different provider   Down 15# last 4 months - goal weight 175lb per patient     Equipment issues: The pressure is  acceptable, the mask is acceptable     Sleep issues:  Do you feel better? Yes  More rested? Sometimes   Better concentration? yes    Progress History:   Since last visit any new medical issues? No  New ER or hospital visits? No  Any new or changes in medicines? No  Any new sleep medicines? No    Review of Systems -   Review of Systems   Constitutional: Negative. Negative for chills and fever. HENT: Negative. Negative for congestion. Eyes: Negative. Respiratory: Negative. Negative for cough, shortness of breath and wheezing. Cardiovascular: Negative. Negative for chest pain and leg swelling. Gastrointestinal: Negative. Endocrine: Negative. Genitourinary: Negative. Musculoskeletal: Negative. Allergic/Immunologic: Negative. Neurological: Negative. Hematological: Negative. Psychiatric/Behavioral: Negative. Negative for sleep disturbance. Physical Exam:    BMI:  Body mass index is 36.84 kg/m². Wt Readings from Last 3 Encounters:   03/01/23 221 lb 6.4 oz (100.4 kg)   01/12/23 222 lb 9.6 oz (101 kg)   10/17/22 236 lb 9.6 oz (107.3 kg)     Weight lost 15 lbs over 4 months   Vitals: /72 (Site: Left Upper Arm, Position: Sitting, Cuff Size: Medium Adult)   Pulse 75   Temp 97.8 °F (36.6 °C) (Oral)   Ht 5' 5\" (1.651 m)   Wt 221 lb 6.4 oz (100.4 kg)   SpO2 97% Comment: Patient on room air  BMI 36.84 kg/m²       Physical Exam  Vitals and nursing note reviewed. Constitutional:       Appearance: She is well-developed. She is obese. HENT:      Head: Normocephalic and atraumatic. Eyes:      Conjunctiva/sclera: Conjunctivae normal.      Pupils: Pupils are equal, round, and reactive to light. Neck:      Vascular: No JVD. Cardiovascular:      Rate and Rhythm: Normal rate and regular rhythm. Heart sounds: Normal heart sounds. No murmur heard. No friction rub. No gallop. Pulmonary:      Effort: Pulmonary effort is normal. No respiratory distress. Breath sounds: Normal breath sounds. No wheezing or rales. Abdominal:      General: Bowel sounds are normal.      Palpations: Abdomen is soft. Musculoskeletal:         General: Normal range of motion. Cervical back: Normal range of motion and neck supple. Skin:     General: Skin is warm and dry. Capillary Refill: Capillary refill takes less than 2 seconds. Neurological:      Mental Status: She is alert and oriented to person, place, and time.    Psychiatric: Behavior: Behavior normal.         Thought Content: Thought content normal.         Judgment: Judgment normal.         ASSESSMENT/DIAGNOSIS     Diagnosis Orders   1. DARIEN on CPAP        2. Insomnia, unspecified type        3. RLS (restless legs syndrome)        4. Obesity (BMI 30-39. 9)             Plan   Do you need any equipment today? No    - Download reviewed and discussed with Carmina Sanchez and myself today in the office   - Compliancy is good but could be better discussed using machine w/more compliance   - She  was advised to continue current positive airway pressure therapy with above described pressure. - She  advised to keep good compliance with current recommended pressure to get optimal results and clinical improvement  - Recommend 7-9 hours of sleep with PAP  - She was advised to call [employer] regarding supplies if needed.   -She call my office for earlier appointment if needed for worsening of sleep symptoms.   - She was instructed on weight loss  - Claudia Leung was educated about my impression and plan. Patient verbalizesunderstanding.   We will see Jewel Culp back in: 6 months with download  - Discussed repeating sleep study once patient is down 100lbs or to goal weight     Information added by my medical assistant/LPN was reviewed today   Electronically signed by KATHERIN Stover - CNP on 3/1/2023 at 9:35 AM

## 2023-05-24 ENCOUNTER — HOSPITAL ENCOUNTER (OUTPATIENT)
Dept: PULMONOLOGY | Age: 48
Discharge: HOME | End: 2023-05-24
Payer: COMMERCIAL

## 2023-05-24 DIAGNOSIS — Z99.89 OSA ON CPAP: ICD-10-CM

## 2023-05-24 DIAGNOSIS — G47.33 OSA ON CPAP: ICD-10-CM

## 2023-05-24 DIAGNOSIS — J45.20 MILD INTERMITTENT ASTHMA WITHOUT COMPLICATION: ICD-10-CM

## 2023-05-24 DIAGNOSIS — Z01.818 PRE-OP EVALUATION: ICD-10-CM

## 2023-05-24 PROCEDURE — 94060 EVALUATION OF WHEEZING: CPT

## 2023-05-24 NOTE — PROGRESS NOTES
[facility]    Patient: Margaret Sanabria, 49 y.o.   : 1975    Patient of Dr. Alexandre Leonard   Patient presents with    Follow-up     1 year pulmonary follow up with bayron 23. ENZO  Adriana Gonzalez is here for follow up for asthma. Patient is here with stable bayron. Overall patient reports respiratory symptoms have been stable since last appointment. Patient using albuterol rarely. Patient reports no physical limitation due to respiratory symptoms. Her past medical history is significant for asthma, diabetes, sleep apnea, gastric sleeve, anxiety, depression, hyperlipidemia. Pertinent negatives: shortness of breath, wheezing, chest tightness, hemoptysis, cough, sputum production   Risk factors for lung disease: no known risk factors    Allergy regimen: Singulair and cetirizine - year round     Asthma control (severity) questionnaire:  Asthma symptoms:  > 2 times per week:  No  Nighttime awakenings: > 2 times per month: No  Use of BABAK for symptoms control (other than pre-exercise use):> 2 times per week: No  Interference with normal activity: No  Lung function: FEV1 >60% predicted: Yes    Number of exacerbations per year: > 2: No      Progress History:   Since last visit any new medical issues? No  New ER or hospital visits? Yes  - shakiness  Any new or changes in medicines? No  Using inhalers? Yes as needed albuterol  Are they helpful? Yes   Any recent exacerbations? No  Last PFT:  - normal  Last 6 MWT: None in epic  Last A1AT: None in epic    Smoking History:  Never smoker  Admits to passive tobacco exposure from both parents. Social History:  Patient job history:  She is currently working as a  for [employer] and [employer].    History of exposure to coal mines/coal dust: NO  History of exposure to foundry dust/welding: NO  History of exposure to quarry/silica/sandblasting: NO  History of

## 2023-05-25 ENCOUNTER — OFFICE VISIT (OUTPATIENT)
Dept: PULMONOLOGY | Age: 48
End: 2023-05-25
Payer: COMMERCIAL

## 2023-05-25 VITALS
BODY MASS INDEX: 35.25 KG/M2 | HEART RATE: 70 BPM | SYSTOLIC BLOOD PRESSURE: 116 MMHG | WEIGHT: 211.6 LBS | OXYGEN SATURATION: 98 % | HEIGHT: 65 IN | DIASTOLIC BLOOD PRESSURE: 76 MMHG

## 2023-05-25 DIAGNOSIS — J45.20 MILD INTERMITTENT ASTHMA WITHOUT COMPLICATION: Primary | ICD-10-CM

## 2023-05-25 DIAGNOSIS — Z99.89 OSA ON CPAP: ICD-10-CM

## 2023-05-25 DIAGNOSIS — G47.33 OSA ON CPAP: ICD-10-CM

## 2023-05-25 PROCEDURE — G8427 DOCREV CUR MEDS BY ELIG CLIN: HCPCS

## 2023-05-25 PROCEDURE — 1036F TOBACCO NON-USER: CPT

## 2023-05-25 PROCEDURE — 99214 OFFICE O/P EST MOD 30 MIN: CPT

## 2023-05-25 PROCEDURE — G8417 CALC BMI ABV UP PARAM F/U: HCPCS

## 2023-05-25 ASSESSMENT — ENCOUNTER SYMPTOMS
RHINORRHEA: 0
COUGH: 0
SINUS PAIN: 0
SHORTNESS OF BREATH: 0
SINUS PRESSURE: 0
WHEEZING: 0
CHEST TIGHTNESS: 0

## 2023-06-30 ENCOUNTER — HOSPITAL ENCOUNTER (EMERGENCY)
Age: 48
Discharge: HOME | End: 2023-06-30
Payer: COMMERCIAL

## 2023-06-30 ENCOUNTER — APPOINTMENT (OUTPATIENT)
Dept: GENERAL RADIOLOGY | Age: 48
End: 2023-06-30
Payer: COMMERCIAL

## 2023-06-30 VITALS
SYSTOLIC BLOOD PRESSURE: 131 MMHG | TEMPERATURE: 97.8 F | HEART RATE: 97 BPM | RESPIRATION RATE: 18 BRPM | DIASTOLIC BLOOD PRESSURE: 93 MMHG | OXYGEN SATURATION: 98 %

## 2023-06-30 DIAGNOSIS — S30.0XXA CONTUSION OF COCCYX, INITIAL ENCOUNTER: Primary | ICD-10-CM

## 2023-06-30 PROCEDURE — 99283 EMERGENCY DEPT VISIT LOW MDM: CPT

## 2023-06-30 PROCEDURE — 72220 X-RAY EXAM SACRUM TAILBONE: CPT

## 2023-07-11 ENCOUNTER — OFFICE VISIT (OUTPATIENT)
Dept: BARIATRICS/WEIGHT MGMT | Age: 48
End: 2023-07-11
Payer: COMMERCIAL

## 2023-07-11 ENCOUNTER — OFFICE VISIT (OUTPATIENT)
Dept: BARIATRICS/WEIGHT MGMT | Age: 48
End: 2023-07-11

## 2023-07-11 ENCOUNTER — HOSPITAL ENCOUNTER (OUTPATIENT)
Age: 48
Discharge: HOME | End: 2023-07-11
Payer: COMMERCIAL

## 2023-07-11 VITALS
HEART RATE: 72 BPM | DIASTOLIC BLOOD PRESSURE: 82 MMHG | HEIGHT: 66 IN | TEMPERATURE: 97.9 F | BODY MASS INDEX: 34.07 KG/M2 | WEIGHT: 212 LBS | SYSTOLIC BLOOD PRESSURE: 122 MMHG

## 2023-07-11 DIAGNOSIS — Z99.89 OSA ON CPAP: ICD-10-CM

## 2023-07-11 DIAGNOSIS — E11.9 TYPE 2 DIABETES MELLITUS WITHOUT COMPLICATION, WITHOUT LONG-TERM CURRENT USE OF INSULIN (HCC): ICD-10-CM

## 2023-07-11 DIAGNOSIS — K21.9 GASTROESOPHAGEAL REFLUX DISEASE WITHOUT ESOPHAGITIS: ICD-10-CM

## 2023-07-11 DIAGNOSIS — Z90.3 S/P GASTRIC SLEEVE PROCEDURE: ICD-10-CM

## 2023-07-11 DIAGNOSIS — G47.33 OSA ON CPAP: ICD-10-CM

## 2023-07-11 DIAGNOSIS — K91.2 POSTSURGICAL MALABSORPTION: ICD-10-CM

## 2023-07-11 DIAGNOSIS — E78.5 HYPERLIPIDEMIA, UNSPECIFIED HYPERLIPIDEMIA TYPE: ICD-10-CM

## 2023-07-11 DIAGNOSIS — Z90.3 S/P GASTRIC SLEEVE PROCEDURE: Primary | ICD-10-CM

## 2023-07-11 DIAGNOSIS — F41.9 ANXIETY: ICD-10-CM

## 2023-07-11 DIAGNOSIS — Z13.21 SCREENING FOR MALNUTRITION: ICD-10-CM

## 2023-07-11 DIAGNOSIS — F32.A DEPRESSION, UNSPECIFIED DEPRESSION TYPE: ICD-10-CM

## 2023-07-11 DIAGNOSIS — Z98.84 STATUS POST BARIATRIC SURGERY: Primary | ICD-10-CM

## 2023-07-11 LAB
25(OH)D3 SERPL-MCNC: 51 NG/ML (ref 30–100)
ALBUMIN SERPL BCG-MCNC: 4.5 G/DL (ref 3.5–5.1)
ALP SERPL-CCNC: 96 U/L (ref 38–126)
ALT SERPL W/O P-5'-P-CCNC: 30 U/L (ref 11–66)
ANION GAP SERPL CALC-SCNC: 14 MEQ/L (ref 8–16)
AST SERPL-CCNC: 25 U/L (ref 5–40)
BASOPHILS ABSOLUTE: 0.1 THOU/MM3 (ref 0–0.1)
BASOPHILS NFR BLD AUTO: 1 %
BILIRUB SERPL-MCNC: 0.8 MG/DL (ref 0.3–1.2)
BUN SERPL-MCNC: 15 MG/DL (ref 7–22)
CALCIUM SERPL-MCNC: 9.7 MG/DL (ref 8.5–10.5)
CHLORIDE SERPL-SCNC: 104 MEQ/L (ref 98–111)
CHOLEST SERPL-MCNC: 181 MG/DL (ref 100–199)
CO2 SERPL-SCNC: 23 MEQ/L (ref 23–33)
CREAT SERPL-MCNC: 0.5 MG/DL (ref 0.4–1.2)
DEPRECATED RDW RBC AUTO: 43.8 FL (ref 35–45)
EOSINOPHIL NFR BLD AUTO: 3.2 %
EOSINOPHILS ABSOLUTE: 0.2 THOU/MM3 (ref 0–0.4)
ERYTHROCYTE [DISTWIDTH] IN BLOOD BY AUTOMATED COUNT: 13.5 % (ref 11.5–14.5)
FERRITIN SERPL IA-MCNC: 62 NG/ML (ref 10–291)
FOLATE SERPL-MCNC: 16.2 NG/ML (ref 4.8–24.2)
GFR SERPL CREATININE-BSD FRML MDRD: > 60 ML/MIN/1.73M2
GLUCOSE SERPL-MCNC: 145 MG/DL (ref 70–108)
HCT VFR BLD AUTO: 41.2 % (ref 37–47)
HDLC SERPL-MCNC: 85 MG/DL
HGB BLD-MCNC: 13.7 GM/DL (ref 12–16)
IMM GRANULOCYTES # BLD AUTO: 0.02 THOU/MM3 (ref 0–0.07)
IMM GRANULOCYTES NFR BLD AUTO: 0.3 %
IRON SATN MFR SERPL: 25 % (ref 20–50)
IRON SERPL-MCNC: 85 UG/DL (ref 50–170)
LDLC SERPL CALC-MCNC: 79 MG/DL
LYMPHOCYTES ABSOLUTE: 1.3 THOU/MM3 (ref 1–4.8)
LYMPHOCYTES NFR BLD AUTO: 18.5 %
MCH RBC QN AUTO: 29.7 PG (ref 26–33)
MCHC RBC AUTO-ENTMCNC: 33.3 GM/DL (ref 32.2–35.5)
MCV RBC AUTO: 89.4 FL (ref 81–99)
MONOCYTES ABSOLUTE: 0.6 THOU/MM3 (ref 0.4–1.3)
MONOCYTES NFR BLD AUTO: 8 %
NEUTROPHILS NFR BLD AUTO: 69 %
NRBC BLD AUTO-RTO: 0 /100 WBC
PLATELET # BLD AUTO: 202 THOU/MM3 (ref 130–400)
PMV BLD AUTO: 11.5 FL (ref 9.4–12.4)
POTASSIUM SERPL-SCNC: 4.1 MEQ/L (ref 3.5–5.2)
PREALB SERPL-MCNC: 20.3 MG/DL (ref 20–40)
PROT SERPL-MCNC: 8.2 G/DL (ref 6.1–8)
PTH-INTACT SERPL-MCNC: 35.3 PG/ML (ref 15–65)
RBC # BLD AUTO: 4.61 MILL/MM3 (ref 4.2–5.4)
SEGMENTED NEUTROPHILS ABSOLUTE COUNT: 4.8 THOU/MM3 (ref 1.8–7.7)
SODIUM SERPL-SCNC: 141 MEQ/L (ref 135–145)
T4 FREE SERPL-MCNC: 1.38 NG/DL (ref 0.93–1.76)
TIBC SERPL-MCNC: 342 UG/DL (ref 171–450)
TRIGL SERPL-MCNC: 86 MG/DL (ref 0–199)
TSH SERPL DL<=0.005 MIU/L-ACNC: 0.68 UIU/ML (ref 0.4–4.2)
VIT B12 SERPL-MCNC: 736 PG/ML (ref 211–911)
WBC # BLD AUTO: 6.9 THOU/MM3 (ref 4.8–10.8)

## 2023-07-11 PROCEDURE — 80053 COMPREHEN METABOLIC PANEL: CPT

## 2023-07-11 PROCEDURE — G8427 DOCREV CUR MEDS BY ELIG CLIN: HCPCS | Performed by: PHYSICIAN ASSISTANT

## 2023-07-11 PROCEDURE — 82607 VITAMIN B-12: CPT

## 2023-07-11 PROCEDURE — 84425 ASSAY OF VITAMIN B-1: CPT

## 2023-07-11 PROCEDURE — 99213 OFFICE O/P EST LOW 20 MIN: CPT | Performed by: PHYSICIAN ASSISTANT

## 2023-07-11 PROCEDURE — 83970 ASSAY OF PARATHORMONE: CPT

## 2023-07-11 PROCEDURE — 83540 ASSAY OF IRON: CPT

## 2023-07-11 PROCEDURE — 2022F DILAT RTA XM EVC RTNOPTHY: CPT | Performed by: PHYSICIAN ASSISTANT

## 2023-07-11 PROCEDURE — 85025 COMPLETE CBC W/AUTO DIFF WBC: CPT

## 2023-07-11 PROCEDURE — 84439 ASSAY OF FREE THYROXINE: CPT

## 2023-07-11 PROCEDURE — 36415 COLL VENOUS BLD VENIPUNCTURE: CPT

## 2023-07-11 PROCEDURE — 3051F HG A1C>EQUAL 7.0%<8.0%: CPT | Performed by: PHYSICIAN ASSISTANT

## 2023-07-11 PROCEDURE — 83550 IRON BINDING TEST: CPT

## 2023-07-11 PROCEDURE — 80061 LIPID PANEL: CPT

## 2023-07-11 PROCEDURE — 1036F TOBACCO NON-USER: CPT | Performed by: PHYSICIAN ASSISTANT

## 2023-07-11 PROCEDURE — G8417 CALC BMI ABV UP PARAM F/U: HCPCS | Performed by: PHYSICIAN ASSISTANT

## 2023-07-11 PROCEDURE — 82746 ASSAY OF FOLIC ACID SERUM: CPT

## 2023-07-11 PROCEDURE — 82728 ASSAY OF FERRITIN: CPT

## 2023-07-11 PROCEDURE — 84134 ASSAY OF PREALBUMIN: CPT

## 2023-07-11 PROCEDURE — 84443 ASSAY THYROID STIM HORMONE: CPT

## 2023-07-11 PROCEDURE — 82306 VITAMIN D 25 HYDROXY: CPT

## 2023-07-11 RX ORDER — PAROXETINE 30 MG/1
TABLET, FILM COATED ORAL
COMMUNITY
Start: 2023-06-23

## 2023-07-11 RX ORDER — TRIAMCINOLONE ACETONIDE 1 MG/G
CREAM TOPICAL
COMMUNITY
Start: 2023-04-18

## 2023-07-11 RX ORDER — METFORMIN HYDROCHLORIDE 500 MG/1
TABLET, EXTENDED RELEASE ORAL
COMMUNITY
Start: 2023-06-23

## 2023-07-11 RX ORDER — CLOTRIMAZOLE AND BETAMETHASONE DIPROPIONATE 10; .64 MG/G; MG/G
CREAM TOPICAL
COMMUNITY
Start: 2023-06-06

## 2023-07-11 NOTE — PATIENT INSTRUCTIONS
Stay well hydrated. Drink a minimum of 64 oz of non-carbonated, non-caffeinated fluids daily. Nutritional education occurred during visit. Tolerating diet. Continue following with dietitian and follow their recommendations as directed. Continue  60-80 grams of protein each day. Signs and symptoms reviewed with patient that would be concerning and need her to return to office for re-evaluation. Patient will call if any questions or concerns arrise. Importance of physical activity discussed with patient. Increase physical activity as tolerated  Add strength training  Continue taking Multivitamin and Calcium   Continue Omeprazole  Encouraged to attend support groups  Annual labs to be completed ASAP-orders given today for vit D/ B1/Pth as she has ordered to complete remainder of annual labs through PCP.    SECA scale completed and reviewed with patient today  Measurements completed and reviewed with patient today  Wear CPAP - adjust pressure with pulmonary as needed

## 2023-07-11 NOTE — PATIENT INSTRUCTIONS
Goals: 1. Protein goal is 60-80 grams protein per day. Remember to choose protein foods first at meals to meet this goal, followed by vegetables, then fruit, and starch food last if still hungry. 2.  Water goal is 64 oz per day. Separate liquids from solids. No liquids 30 minutes before meals and no liquids 30 minutes after your meals. 3.  Take 20-30 minutes to eat - this helps with mindful eating as well. Do not skip meals and stick to consistent meal times schedule as much as possible. 4.  Physical activity remains important to maintain weight loss efforts  5. Routine vitamin intake is important to avoid deficiencies. Continue the followin. Celebrate one 45 chewable Multivitamin once a day . 2.   Continue one 500 mg calcium soft chew twice a day (make sure taking two hours apart from the Multivitamin)  Get lab work done now

## 2023-07-11 NOTE — PROGRESS NOTES
Patient is a 49 y.o. female seen for follow up MNT visit for one year post op. Vitals from current and previous visits:       Vitals 2/63/1505   SYSTOLIC 214   DIASTOLIC 82   Site Right Upper Arm   Position Sitting   Cuff Size Large Adult   Pulse 72   Temp 97.9   Resp    SpO2    Weight 212 lb   Height 5' 5.5\"   Body Mass Index 34.74 kg/m2   Pain Level    Waist (Inches) 38   Neck circumference (Inches)        Recent Post Op Lab Work:   One year labs- not done yet will do today  Lab Results   Component Value Date/Time    VITD25 36 01/09/2023 03:15 PM       Date of Surgery: 7/11/22  Type of Surgery: gastric sleeve      Initial Weight: 273 lbs at pre-op teaching class   Excess  Body Weight Pre-Op: 127  lbs     Weight Loss: 61 lbs. Patient's Target Weight =  146 lbs for a BMI of ~25  Percentage of Excess Body Weight Lost to date is :  48%    Started working for [employer] four days a week - Wednesday-Saturday    Pt states would like to lose more weight - states could be exercising more to help with weight loss    Are you experiencing any physical problems post surgery: Denies any problems with bowels moving    Are you experiencing any dietary problems post surgery:  Denies any food intolerances. \"I can eat seafood now\"    How frequently are you eating? \"It feels like i'm grazing all day\" - when at work- usually eats every two hours at work  How long does it take you to finish a meal? Taking time to eat-   How full do you feel after eating? Pt feel full after eats    Brings foods to work- celery and peanut butter, atkins bar. Eating watermelon. States can tolerate meats. Will eat frozen chinese meals    Protein intake: 60-80 grams/day   Patient taking protein supplement: Yes.   Brand of Supplement: Premier Protein Shake two- three a day or Ensure Max- gave pt coupons today to use     Fluid intake: Denies any difficulty with fluid intake - four ~ 30 oz tumbler apodaca a day   One cup decafe coffee in am, one detox

## 2023-07-14 LAB — VIT B1 PYROPHOSHATE BLD-SCNC: 151 NMOL/L (ref 70–180)

## 2023-08-23 ENCOUNTER — TELEPHONE (OUTPATIENT)
Dept: PULMONOLOGY | Age: 48
End: 2023-08-23

## 2023-09-05 NOTE — PROGRESS NOTES
ASSESSMENT/DIAGNOSIS     Diagnosis Orders   1. DARIEN on CPAP  DME Order for CPAP as OP      2. Insomnia, unspecified type        3. RLS (restless legs syndrome)        4. Obesity (BMI 30-39. 9)             Plan   Do you need any equipment today? Yes   -Download reviewed and discussed with Melyssa Alvarado and myself today in the office   -Patient's symptoms and AHI are controlled with current settings of 11 cmH2O.  -Advised to continue current positive airway pressure therapy with above described pressure. -Advised to keep good compliance with current recommended pressure to get optimal results and clinical improvement  -Recommend 7-9 hours of sleep with PAP  -Instructed to call DME company regarding supplies if needed.   -Patient to call my office for earlier appointment if needed for worsening of sleep symptoms.   -Patient is not to drive if feeling sleepy  -Discussed weight loss and reaching out to PCP for further options   -Educated about my impression and plan. Patient verbalizes understanding.  -Continue Trazodone and Requip as previously prescribed       Will see J Carlos Shelley back in: 1 year with download. Information added by my medical assistant/LPN was reviewed today.     Electronically signed by KATHERIN Crisostomo CNP on 9/6/2023 at 9:36 AM

## 2023-09-06 ENCOUNTER — OFFICE VISIT (OUTPATIENT)
Dept: PULMONOLOGY | Age: 48
End: 2023-09-06
Payer: COMMERCIAL

## 2023-09-06 VITALS
DIASTOLIC BLOOD PRESSURE: 76 MMHG | HEIGHT: 66 IN | WEIGHT: 214 LBS | BODY MASS INDEX: 34.39 KG/M2 | OXYGEN SATURATION: 97 % | SYSTOLIC BLOOD PRESSURE: 124 MMHG | TEMPERATURE: 98 F | HEART RATE: 78 BPM

## 2023-09-06 DIAGNOSIS — G25.81 RLS (RESTLESS LEGS SYNDROME): ICD-10-CM

## 2023-09-06 DIAGNOSIS — G47.00 INSOMNIA, UNSPECIFIED TYPE: ICD-10-CM

## 2023-09-06 DIAGNOSIS — G47.33 OSA ON CPAP: Primary | ICD-10-CM

## 2023-09-06 DIAGNOSIS — Z99.89 OSA ON CPAP: Primary | ICD-10-CM

## 2023-09-06 DIAGNOSIS — E66.9 OBESITY (BMI 30-39.9): ICD-10-CM

## 2023-09-06 PROCEDURE — G8427 DOCREV CUR MEDS BY ELIG CLIN: HCPCS | Performed by: NURSE PRACTITIONER

## 2023-09-06 PROCEDURE — 1036F TOBACCO NON-USER: CPT | Performed by: NURSE PRACTITIONER

## 2023-09-06 PROCEDURE — 99214 OFFICE O/P EST MOD 30 MIN: CPT | Performed by: NURSE PRACTITIONER

## 2023-09-06 PROCEDURE — G8417 CALC BMI ABV UP PARAM F/U: HCPCS | Performed by: NURSE PRACTITIONER

## 2023-09-06 ASSESSMENT — ENCOUNTER SYMPTOMS
SHORTNESS OF BREATH: 0
RESPIRATORY NEGATIVE: 1
GASTROINTESTINAL NEGATIVE: 1
WHEEZING: 0
ALLERGIC/IMMUNOLOGIC NEGATIVE: 1
EYES NEGATIVE: 1
COUGH: 0

## 2024-02-12 ENCOUNTER — HOSPITAL ENCOUNTER (EMERGENCY)
Age: 49
Discharge: HOME | End: 2024-02-12
Payer: COMMERCIAL

## 2024-02-12 ENCOUNTER — APPOINTMENT (OUTPATIENT)
Dept: GENERAL RADIOLOGY | Age: 49
End: 2024-02-12
Payer: COMMERCIAL

## 2024-02-12 VITALS
TEMPERATURE: 98.1 F | DIASTOLIC BLOOD PRESSURE: 99 MMHG | HEART RATE: 74 BPM | OXYGEN SATURATION: 97 % | BODY MASS INDEX: 35.23 KG/M2 | WEIGHT: 215 LBS | RESPIRATION RATE: 17 BRPM | SYSTOLIC BLOOD PRESSURE: 131 MMHG

## 2024-02-12 DIAGNOSIS — S83.412A SPRAIN OF MEDIAL COLLATERAL LIGAMENT OF LEFT KNEE, INITIAL ENCOUNTER: Primary | ICD-10-CM

## 2024-02-12 PROCEDURE — 99283 EMERGENCY DEPT VISIT LOW MDM: CPT

## 2024-02-12 PROCEDURE — 6370000000 HC RX 637 (ALT 250 FOR IP): Performed by: PHYSICIAN ASSISTANT

## 2024-02-12 PROCEDURE — 73564 X-RAY EXAM KNEE 4 OR MORE: CPT

## 2024-02-12 RX ORDER — TRAMADOL HYDROCHLORIDE 50 MG/1
50 TABLET ORAL EVERY 6 HOURS PRN
Qty: 12 TABLET | Refills: 0 | Status: SHIPPED | OUTPATIENT
Start: 2024-02-12 | End: 2024-02-15

## 2024-02-12 RX ORDER — TRAMADOL HYDROCHLORIDE 50 MG/1
50 TABLET ORAL ONCE
Status: COMPLETED | OUTPATIENT
Start: 2024-02-12 | End: 2024-02-12

## 2024-02-12 RX ADMIN — TRAMADOL HYDROCHLORIDE 50 MG: 50 TABLET, COATED ORAL at 22:15

## 2024-02-12 ASSESSMENT — PAIN SCALES - GENERAL: PAINLEVEL_OUTOF10: 6

## 2024-02-12 ASSESSMENT — PAIN DESCRIPTION - PAIN TYPE: TYPE: ACUTE PAIN

## 2024-02-12 ASSESSMENT — PAIN DESCRIPTION - LOCATION: LOCATION: KNEE

## 2024-02-12 ASSESSMENT — PAIN - FUNCTIONAL ASSESSMENT: PAIN_FUNCTIONAL_ASSESSMENT: 0-10

## 2024-02-12 ASSESSMENT — PAIN DESCRIPTION - DESCRIPTORS: DESCRIPTORS: ACHING

## 2024-02-12 ASSESSMENT — PAIN DESCRIPTION - FREQUENCY: FREQUENCY: CONTINUOUS

## 2024-02-12 ASSESSMENT — PAIN DESCRIPTION - ORIENTATION: ORIENTATION: LEFT

## 2024-02-13 NOTE — ED TRIAGE NOTES
Patient to the ED by wheel chair with c/c of left knee pain. Patient states she was getting out of her work van today at 1400 when her left knee popped. Patient reports her knee popped several years ago when she tore her mensicus and had it repaired. Patient states her knee feels similar. Pain rated 6/10, but worsening with flexion. She states she took ibuprofen prior to arrival. VSS.

## 2024-02-13 NOTE — ED PROVIDER NOTES
[facility] EMERGENCY DEPT      Pt Name: Suzi Mendoza  MRN: [id number]  Birthdate 1975  Date of evaluation: 2024  Provider: Suzi Crabtree PA-C    CHIEF COMPLAINT       Chief Complaint   Patient presents with    Knee Pain     left       Nurses Notes reviewed and I agree except as noted in the HPI.      HISTORY OF PRESENT ILLNESS    Suzi Mendoza is a 48 y.o. female who presents through the lobby for left knee pain.  Patient's left knee has been bothering her for a little over a week.  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  She denies that this is a work-related injury.  She is concerned for torn meniscus as this feels similar to her previous torn meniscus.  Patient had arthroscopic surgery by Dr. Serrano some years back.  There is swelling and pain is worse with weightbearing.  Patient took ibuprofen at 1500 and used Tiger balm which helped but she still endorses pain.  Patient denies numbness, weakness, dizziness, head injury, neck pain, back pain, nausea, or other complaints.        PAST MEDICAL HISTORY    has a past medical history of Allergic rhinitis, Anxiety, Asthma, Depression, Diabetes (HCC), Hyperlipidemia, Psychiatric problem, and Sleep apnea.    SURGICAL HISTORY      has a past surgical history that includes  section; Hysterectomy; Carpal tunnel release; Knee arthroscopy; and Sleeve Gastrectomy (N/A, 2022).    CURRENT MEDICATIONS       Discharge Medication List as of 2024 10:18 PM        CONTINUE these medications which have NOT CHANGED    Details   PARoxetine (PAXIL) 30 MG tablet TAKE 1 TABLET BY MOUTH DAILY IN THE MORNINGHistorical Med      clotrimazole-betamethasone (LOTRISONE) 1-0.05 % cream Historical Med      metFORMIN (GLUCOPHAGE-XR) 500 MG extended release tablet 0.5 mg daily (with breakfast)Historical Med      triamcinolone (KENALOG) 0.1 % cream Historical Med      traZODone (DESYREL) 150 MG tablet Take 1

## 2024-04-17 ENCOUNTER — NURSE ONLY (OUTPATIENT)
Dept: LAB | Age: 49
End: 2024-04-17

## 2024-04-29 LAB — CYTOLOGY THIN PREP PAP: NORMAL

## 2024-09-09 ENCOUNTER — OFFICE VISIT (OUTPATIENT)
Dept: PULMONOLOGY | Age: 49
End: 2024-09-09
Payer: COMMERCIAL

## 2024-09-09 VITALS
SYSTOLIC BLOOD PRESSURE: 110 MMHG | BODY MASS INDEX: 35.72 KG/M2 | WEIGHT: 214.4 LBS | HEART RATE: 75 BPM | TEMPERATURE: 98.1 F | DIASTOLIC BLOOD PRESSURE: 68 MMHG | OXYGEN SATURATION: 98 % | HEIGHT: 65 IN

## 2024-09-09 DIAGNOSIS — E66.9 OBESITY (BMI 30-39.9): ICD-10-CM

## 2024-09-09 DIAGNOSIS — G25.81 RLS (RESTLESS LEGS SYNDROME): ICD-10-CM

## 2024-09-09 DIAGNOSIS — G47.33 OSA ON CPAP: Primary | ICD-10-CM

## 2024-09-09 PROCEDURE — 99214 OFFICE O/P EST MOD 30 MIN: CPT | Performed by: NURSE PRACTITIONER

## 2024-09-09 PROCEDURE — G8427 DOCREV CUR MEDS BY ELIG CLIN: HCPCS | Performed by: NURSE PRACTITIONER

## 2024-09-09 PROCEDURE — G8417 CALC BMI ABV UP PARAM F/U: HCPCS | Performed by: NURSE PRACTITIONER

## 2024-09-09 PROCEDURE — 3017F COLORECTAL CA SCREEN DOC REV: CPT | Performed by: NURSE PRACTITIONER

## 2024-09-09 PROCEDURE — 1036F TOBACCO NON-USER: CPT | Performed by: NURSE PRACTITIONER

## 2024-09-09 RX ORDER — ESTRADIOL 0.1 MG/G
CREAM VAGINAL
COMMUNITY
Start: 2024-07-26

## 2024-09-09 RX ORDER — PRAMIPEXOLE DIHYDROCHLORIDE 0.12 MG/1
TABLET ORAL
COMMUNITY
Start: 2024-09-03

## 2024-09-09 RX ORDER — LIRAGLUTIDE 6 MG/ML
INJECTION SUBCUTANEOUS
COMMUNITY
Start: 2024-08-09

## 2024-09-09 RX ORDER — PEN NEEDLE, DIABETIC 32GX 5/32"
NEEDLE, DISPOSABLE MISCELLANEOUS
COMMUNITY
Start: 2024-08-03

## 2024-09-09 RX ORDER — CELECOXIB 200 MG/1
CAPSULE ORAL
COMMUNITY
Start: 2024-07-26

## 2024-09-09 RX ORDER — PAROXETINE 40 MG/1
TABLET, FILM COATED ORAL
COMMUNITY
Start: 2024-08-19

## 2024-09-09 ASSESSMENT — ENCOUNTER SYMPTOMS
ALLERGIC/IMMUNOLOGIC NEGATIVE: 1
RESPIRATORY NEGATIVE: 1
COUGH: 0
SHORTNESS OF BREATH: 0
WHEEZING: 0
EYES NEGATIVE: 1
GASTROINTESTINAL NEGATIVE: 1

## 2024-12-31 NOTE — ED TRIAGE NOTES
Pt presents to the ED from home with complaints of right sided flank pain and left sided back pain. Pt rates pain an 8/10. Pt states pain started around 330 pm this afternoon. Pt denies feeling nauseous or getting sick. Pt states she is not having any trouble urinating. Pt is alert and oriented x4 with respirations even and unlabored.
- - -

## 2025-01-20 ENCOUNTER — HOSPITAL ENCOUNTER (EMERGENCY)
Age: 50
Discharge: HOME | End: 2025-01-20
Attending: EMERGENCY MEDICINE
Payer: COMMERCIAL

## 2025-01-20 ENCOUNTER — APPOINTMENT (OUTPATIENT)
Dept: GENERAL RADIOLOGY | Age: 50
End: 2025-01-20
Payer: COMMERCIAL

## 2025-01-20 VITALS
OXYGEN SATURATION: 99 % | TEMPERATURE: 98.4 F | DIASTOLIC BLOOD PRESSURE: 96 MMHG | SYSTOLIC BLOOD PRESSURE: 139 MMHG | BODY MASS INDEX: 36.61 KG/M2 | WEIGHT: 220 LBS | RESPIRATION RATE: 18 BRPM | HEART RATE: 84 BPM

## 2025-01-20 DIAGNOSIS — S46.002A INJURY OF LEFT ROTATOR CUFF, INITIAL ENCOUNTER: Primary | ICD-10-CM

## 2025-01-20 PROCEDURE — 6360000002 HC RX W HCPCS

## 2025-01-20 PROCEDURE — 99284 EMERGENCY DEPT VISIT MOD MDM: CPT

## 2025-01-20 PROCEDURE — 96372 THER/PROPH/DIAG INJ SC/IM: CPT

## 2025-01-20 PROCEDURE — 73030 X-RAY EXAM OF SHOULDER: CPT

## 2025-01-20 RX ORDER — KETOROLAC TROMETHAMINE 30 MG/ML
30 INJECTION, SOLUTION INTRAMUSCULAR; INTRAVENOUS ONCE
Status: COMPLETED | OUTPATIENT
Start: 2025-01-20 | End: 2025-01-20

## 2025-01-20 RX ADMIN — KETOROLAC TROMETHAMINE 30 MG: 30 INJECTION, SOLUTION INTRAMUSCULAR at 13:18

## 2025-01-20 ASSESSMENT — PAIN - FUNCTIONAL ASSESSMENT: PAIN_FUNCTIONAL_ASSESSMENT: 0-10

## 2025-01-20 ASSESSMENT — PAIN SCALES - GENERAL: PAINLEVEL_OUTOF10: 8

## 2025-01-20 ASSESSMENT — PAIN DESCRIPTION - ORIENTATION: ORIENTATION: LEFT

## 2025-01-20 ASSESSMENT — PAIN DESCRIPTION - ONSET: ONSET: ON-GOING

## 2025-01-20 ASSESSMENT — PAIN DESCRIPTION - PAIN TYPE: TYPE: ACUTE PAIN

## 2025-01-20 ASSESSMENT — PAIN DESCRIPTION - DESCRIPTORS: DESCRIPTORS: TEARING;THROBBING

## 2025-01-20 ASSESSMENT — PAIN DESCRIPTION - FREQUENCY: FREQUENCY: CONTINUOUS

## 2025-01-20 ASSESSMENT — PAIN DESCRIPTION - LOCATION: LOCATION: SHOULDER

## 2025-01-20 NOTE — ED PROVIDER NOTES
ATTENDING NOTE:    I supervised and discussed the history, physical exam and the management of this patient with the resident. I reviewed the resident's note and agree with the documented findings and plan of care.  Please see my additional note.    I personally saw and examined the patient.  I have reviewed and agree with the resident's findings, including all diagnostic interpretations and treatment plans as written.  I was present for the key portion of any procedures performed and the inclusive time noted in any critical care statement.    Electronically verified by Aurelia Morales MD  01/20/25 1948    
cervical vertebrae, humerus.   Skin:     General: Skin is warm and dry.   Neurological:      Mental Status: She is alert and oriented to person, place, and time.        FORMAL DIAGNOSTIC RESULTS   RADIOLOGY: Interpretation per the Radiologist below, if available at the time of this note (none if blank):  XR SHOULDER LEFT (MIN 2 VIEWS)    (Results Pending)       LABS: (none if blank)  Labs Reviewed - No data to display  (Any cultures that may have been sent were not resulted at the time of this patient visit. A negative COVID-19 test should be interpreted as COVID no longer suspected unless otherwise noted in this encounter documentation note)    EKG: None    MEDICAL DECISION MAKING     Summary: This is a 49 y.o. old female presenting 1 week after hearing and feeling a pop in her left shoulder while at work.  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  Shoulder x-ray without dislocation or fracture.  Patient on physical exam experiencing increase difficulties with shoulder abduction, external rotation, empty can test.  My suspicion is that the patient has an acute on chronic rotator cuff exacerbation.  Patient feeling improved after IM Toradol.  Patient advised on how to follow-up with [facility].      ED COURSE   ED Medications administered this visit (None if left blank):   Medications   ketorolac (TORADOL) injection 30 mg (has no administration in time range)            Procedures: (None if left blank)  Procedures:     Consultants:  None    Documentation:  N/A    MEDICATION CHANGES     New Prescriptions    No medications on file       FINAL IMPRESSION     Final diagnoses:   None       DISPOSITION   DISPOSITION                 Results and plan discussed with patient at bedside. Patient is agreeable to plan.     Outpatient Follow-Up:  [facility]  [address] [facility] [address] [phone]           This

## 2025-01-20 NOTE — ED NOTES
Pt presents to the ER for a L shoulder injury. Pt states that she was picking up a fryer bottom and heard something pop in her shoulder. Pt states that now she cannot  a gallon of milk.

## 2025-01-23 ENCOUNTER — TELEPHONE (OUTPATIENT)
Dept: BARIATRICS/WEIGHT MGMT | Age: 50
End: 2025-01-23

## 2025-01-23 NOTE — TELEPHONE ENCOUNTER
LVM for patient. It's been since 2023 since we've seen her so I reached out to see if she wanted to make an appt with us for continued care.

## 2025-03-04 ENCOUNTER — HOSPITAL ENCOUNTER (EMERGENCY)
Age: 50
Discharge: HOME | End: 2025-03-04
Payer: COMMERCIAL

## 2025-03-04 ENCOUNTER — APPOINTMENT (OUTPATIENT)
Dept: GENERAL RADIOLOGY | Age: 50
End: 2025-03-04
Payer: COMMERCIAL

## 2025-03-04 ENCOUNTER — APPOINTMENT (OUTPATIENT)
Dept: CT IMAGING | Age: 50
End: 2025-03-04
Payer: COMMERCIAL

## 2025-03-04 VITALS
OXYGEN SATURATION: 96 % | DIASTOLIC BLOOD PRESSURE: 106 MMHG | SYSTOLIC BLOOD PRESSURE: 142 MMHG | BODY MASS INDEX: 36.7 KG/M2 | WEIGHT: 215 LBS | HEART RATE: 74 BPM | RESPIRATION RATE: 20 BRPM | HEIGHT: 64 IN | TEMPERATURE: 98.2 F

## 2025-03-04 DIAGNOSIS — M54.6 PAIN IN THORACIC SPINE: Primary | ICD-10-CM

## 2025-03-04 PROCEDURE — 6360000002 HC RX W HCPCS: Performed by: PHYSICIAN ASSISTANT

## 2025-03-04 PROCEDURE — 99284 EMERGENCY DEPT VISIT MOD MDM: CPT

## 2025-03-04 PROCEDURE — 96372 THER/PROPH/DIAG INJ SC/IM: CPT

## 2025-03-04 PROCEDURE — 72072 X-RAY EXAM THORAC SPINE 3VWS: CPT

## 2025-03-04 PROCEDURE — 72128 CT CHEST SPINE W/O DYE: CPT

## 2025-03-04 RX ORDER — KETOROLAC TROMETHAMINE 10 MG/1
10 TABLET, FILM COATED ORAL EVERY 6 HOURS PRN
Qty: 15 TABLET | Refills: 0 | Status: SHIPPED | OUTPATIENT
Start: 2025-03-04

## 2025-03-04 RX ORDER — ORPHENADRINE CITRATE 100 MG/1
100 TABLET ORAL 2 TIMES DAILY
Qty: 14 TABLET | Refills: 0 | Status: SHIPPED | OUTPATIENT
Start: 2025-03-04

## 2025-03-04 RX ORDER — KETOROLAC TROMETHAMINE 30 MG/ML
30 INJECTION, SOLUTION INTRAMUSCULAR; INTRAVENOUS ONCE
Status: COMPLETED | OUTPATIENT
Start: 2025-03-04 | End: 2025-03-04

## 2025-03-04 RX ORDER — ORPHENADRINE CITRATE 30 MG/ML
60 INJECTION INTRAMUSCULAR; INTRAVENOUS ONCE
Status: COMPLETED | OUTPATIENT
Start: 2025-03-04 | End: 2025-03-04

## 2025-03-04 RX ADMIN — ORPHENADRINE CITRATE 60 MG: 60 INJECTION INTRAMUSCULAR; INTRAVENOUS at 13:29

## 2025-03-04 RX ADMIN — KETOROLAC TROMETHAMINE 30 MG: 30 INJECTION, SOLUTION INTRAMUSCULAR at 13:29

## 2025-03-04 ASSESSMENT — ENCOUNTER SYMPTOMS
BACK PAIN: 1
NAUSEA: 0
SHORTNESS OF BREATH: 0
DIARRHEA: 0
COUGH: 0
ABDOMINAL PAIN: 0
VOMITING: 0

## 2025-03-04 ASSESSMENT — PAIN DESCRIPTION - ORIENTATION: ORIENTATION: UPPER

## 2025-03-04 ASSESSMENT — PAIN SCALES - GENERAL: PAINLEVEL_OUTOF10: 10

## 2025-03-04 ASSESSMENT — PAIN DESCRIPTION - LOCATION: LOCATION: BACK

## 2025-03-04 ASSESSMENT — PAIN - FUNCTIONAL ASSESSMENT: PAIN_FUNCTIONAL_ASSESSMENT: 0-10

## 2025-03-04 NOTE — ED NOTES
Patient presents to the ED from home with complaints of having upper back pain. She states that she when she reached down to dry off her legs she felt a burning a sensation in her back and was unable to stand back up at the time.

## 2025-03-04 NOTE — ED PROVIDER NOTES
[facility] EMERGENCY DEPT      Pt Name: Suzi Mendoza  MRN: [id number]  Birthdate 1975  Date of evaluation: 3/4/2025  Provider: Suzi Crabtree PA-C    CHIEF COMPLAINT       Chief Complaint   Patient presents with    Back Pain     upper       Nurses Notes reviewed and I agree except as noted in the HPI.      HISTORY OF PRESENT ILLNESS    Suzi Mendoza is a 49 y.o. female who presents for 2 hours of mid upper back pain. She was bent over in the shower and had sudden onset burning pain localized over the spine. The pain is now sharp and is localized at mid-upper back over the spine. She has had back pain before but it is always in her lower back, and this time is much more severe. She cannot sit up straight without intense pain. She has not taken anything for the pain. She denies any radiation of pain down the arms and legs or anywhere else in the back. She denies any fever/chills, chest pain, shortness of breath, bladder/bowel dysfunction, saddle anesthesia, cold symptoms, N/V/D, abdominal pain, or pain in any other joints.    Location/Symptom: Mid-upper back pain  Timing/Onset: Sudden  Context/Setting: After bending over  Quality: Sharp, burning  Duration: 2 hours  Modifying Factors: Better when laying down  Severity: 10/10    REVIEW OF SYSTEMS     Review of Systems   Constitutional:  Negative for activity change, chills and fever.   HENT:  Negative for congestion, ear pain and rhinorrhea.    Respiratory:  Negative for cough and shortness of breath.    Cardiovascular:  Negative for chest pain and palpitations.   Gastrointestinal:  Negative for abdominal pain, diarrhea, nausea and vomiting.        No incontinence of bowel    Genitourinary:  Negative for difficulty urinating, dysuria and frequency.        No incontinence of bladder   Musculoskeletal:  Positive for back pain. Negative for arthralgias, gait problem and neck pain.   Skin:  Negative for rash.   Neurological:  Positive for  She reports current alcohol use. She reports that she does not use drugs.    PHYSICAL EXAM     INITIAL VITALS:  height is 1.626 m (5' 4\") and weight is 97.5 kg (215 lb). Her oral temperature is 98.2 °F (36.8 °C). Her blood pressure is 142/106 (abnormal) and her pulse is 74. Her respiration is 20 and oxygen saturation is 96%.    Physical Exam  Vitals and nursing note reviewed.   Constitutional:       General: She is in acute distress.      Appearance: Normal appearance. She is well-developed. She is not toxic-appearing or diaphoretic.   HENT:      Head: Normocephalic and atraumatic.      Right Ear: Hearing normal.      Left Ear: Hearing normal.      Nose: Nose normal. No rhinorrhea.      Mouth/Throat:      Lips: Pink.      Pharynx: Uvula midline.   Eyes:      General: Lids are normal.      Conjunctiva/sclera: Conjunctivae normal.      Pupils: Pupils are equal, round, and reactive to light.   Neck:      Trachea: No tracheal deviation.   Cardiovascular:      Rate and Rhythm: Normal rate and regular rhythm.      Pulses:           Radial pulses are 2+ on the right side and 2+ on the left side.        Dorsalis pedis pulses are 2+ on the right side and 2+ on the left side.        Posterior tibial pulses are 2+ on the right side and 2+ on the left side.      Heart sounds: Normal heart sounds. No murmur heard.     No friction rub. No gallop.   Pulmonary:      Effort: Pulmonary effort is normal. No respiratory distress.      Breath sounds: Normal breath sounds. No decreased breath sounds or wheezing.   Abdominal:      General: There is no distension.      Palpations: Abdomen is soft. Abdomen is not rigid. There is no pulsatile mass.      Tenderness: There is no abdominal tenderness. There is no guarding or rebound.   Musculoskeletal:      Cervical back: Normal, full passive range of motion without pain and neck supple. No rigidity or tenderness.      Thoracic back: Tenderness and bony tenderness present. Decreased range of

## 2025-03-07 ASSESSMENT — ENCOUNTER SYMPTOMS: RHINORRHEA: 0

## (undated) DEVICE — ABDOMINAL BINDER: Brand: DEROYAL

## (undated) DEVICE — GLOVE ORANGE PI 7   MSG9070

## (undated) DEVICE — SEAL

## (undated) DEVICE — ELECTRO LUBE IS A SINGLE PATIENT USE DEVICE THAT IS INTENDED TO BE USED ON ELECTROSURGICAL ELECTRODES TO REDUCE STICKING.: Brand: KEY SURGICAL ELECTRO LUBE

## (undated) DEVICE — TROCAR: Brand: KII FIOS FIRST ENTRY

## (undated) DEVICE — CANNULA SEAL

## (undated) DEVICE — GAUZE,SPONGE,8"X4",12PLY,XRAY,STRL,LF: Brand: MEDLINE

## (undated) DEVICE — PACK PROCEDURE SURG SET UP SRMC

## (undated) DEVICE — 3M™ STERI-STRIP™ COMPOUND BENZOIN TINCTURE 40 BAGS/CARTON 4 CARTONS/CASE C1544: Brand: 3M™ STERI-STRIP™

## (undated) DEVICE — VESSEL SEALER EXTEND: Brand: ENDOWRIST

## (undated) DEVICE — STAPLER 60 RELOAD BLACK: Brand: SUREFORM

## (undated) DEVICE — PACK-MAJOR

## (undated) DEVICE — HYPODERMIC SAFETY NEEDLE: Brand: MAGELLAN

## (undated) DEVICE — SUTURE ETHBND EXCEL SZ 0 L30IN NONABSORBABLE GRN L26MM SH X834H

## (undated) DEVICE — GOWN,SIRUS,NONRNF,SETINSLV,XL,20/CS: Brand: MEDLINE

## (undated) DEVICE — GLOVE ORANGE PI 8   MSG9080

## (undated) DEVICE — INTENDED FOR TISSUE SEPARATION, AND OTHER PROCEDURES THAT REQUIRE A SHARP SURGICAL BLADE TO PUNCTURE OR CUT.: Brand: BARD-PARKER ® CARBON RIB-BACK BLADES

## (undated) DEVICE — VISIGI 3D®  CALIBRATION SYSTEM  SIZE 40FR STD W/ BULB: Brand: BOEHRINGER® VISIGI 3D™ SLEEVE GASTRECTOMY CALIBRATION SYSTEM, SIZE 40FR W/BULB

## (undated) DEVICE — STAPLER 60: Brand: SUREFORM

## (undated) DEVICE — ARM DRAPE

## (undated) DEVICE — SUTURE VCRL + SZ 4-0 L27IN ABSRB WHT FS-2 3/8 CIR REV CUT VCP422H

## (undated) DEVICE — BANDAGE COMPR W6INXL5YD WHT BGE POLY COT M E WRP WV HK AND

## (undated) DEVICE — SOLUTION ANTIFOG VIS SYS CLEARIFY LAPSCP

## (undated) DEVICE — APPLICATOR MEDICATED 26 CC SOLUTION HI LT ORNG CHLORAPREP

## (undated) DEVICE — STAPLE INT BIOABSORBABLE REINF LN SUREFORM 60 GRN SEAMGRD

## (undated) DEVICE — TUBING INSUFFLATOR HEAT HUMIDIFIED SMK EVAC SET PNEUMOCLEAR

## (undated) DEVICE — BANDAGE ADH W1XL3IN NAT FAB WVN FLX DURABLE N ADH PD SEAL

## (undated) DEVICE — PACK,UNIVERSAL,NO GOWNS: Brand: MEDLINE

## (undated) DEVICE — SYRINGE MED 10ML LUERLOCK TIP W/O SFTY DISP

## (undated) DEVICE — STAPLE ENDOSCP BIOABSORB LN REINF ENDOPATH SEAMGUARD BLK

## (undated) DEVICE — GOWN,SIRUS,NON REINFRCD,LARGE,SET IN SL: Brand: MEDLINE

## (undated) DEVICE — PUMP SUC IRR TBNG L10FT W/ HNDPC ASSEMB STRYKEFLOW 2

## (undated) DEVICE — BLADELESS OBTURATOR: Brand: WECK VISTA

## (undated) DEVICE — BASIC SINGLE BASIN BTC-LF: Brand: MEDLINE INDUSTRIES, INC.

## (undated) DEVICE — REDUCER: Brand: ENDOWRIST

## (undated) DEVICE — STRIP,CLOSURE,WOUND,MEDI-STRIP,1/2X4: Brand: MEDLINE

## (undated) DEVICE — SUTURE VCRL + SZ 0 L27IN ABSRB VLT L26MM UR-6 5/8 CIR VCP603H

## (undated) DEVICE — STAPLER 60 RELOAD GREEN: Brand: SUREFORM

## (undated) DEVICE — COLUMN DRAPE

## (undated) DEVICE — 35 ML SYRINGE LUER-LOCK TIP: Brand: MONOJECT